# Patient Record
Sex: FEMALE | Race: WHITE | Employment: OTHER | URBAN - NONMETROPOLITAN AREA
[De-identification: names, ages, dates, MRNs, and addresses within clinical notes are randomized per-mention and may not be internally consistent; named-entity substitution may affect disease eponyms.]

---

## 2017-12-04 ENCOUNTER — DOCTOR'S OFFICE (OUTPATIENT)
Dept: URBAN - NONMETROPOLITAN AREA CLINIC 1 | Facility: CLINIC | Age: 67
Setting detail: OPHTHALMOLOGY
End: 2017-12-04
Payer: COMMERCIAL

## 2017-12-04 ENCOUNTER — RX ONLY (RX ONLY)
Age: 67
End: 2017-12-04

## 2017-12-04 DIAGNOSIS — H43.813: ICD-10-CM

## 2017-12-04 DIAGNOSIS — Z79.4: ICD-10-CM

## 2017-12-04 DIAGNOSIS — H43.812: ICD-10-CM

## 2017-12-04 DIAGNOSIS — H40.053: ICD-10-CM

## 2017-12-04 DIAGNOSIS — E11.3311: ICD-10-CM

## 2017-12-04 DIAGNOSIS — H43.811: ICD-10-CM

## 2017-12-04 DIAGNOSIS — H25.13: ICD-10-CM

## 2017-12-04 PROCEDURE — 92004 COMPRE OPH EXAM NEW PT 1/>: CPT | Performed by: OPHTHALMOLOGY

## 2017-12-04 PROCEDURE — 92225 OPHTHALMOSCOPY EXTENDED INITIAL: CPT | Performed by: OPHTHALMOLOGY

## 2017-12-04 PROCEDURE — 92134 CPTRZ OPH DX IMG PST SGM RTA: CPT | Performed by: OPHTHALMOLOGY

## 2017-12-04 ASSESSMENT — REFRACTION_MANIFEST
OS_VA2: 20/
OD_VA3: 20/
OD_VA1: 20/
OS_VA2: 20/
OD_VA2: 20/
OD_VA1: 20/
OD_VA2: 20/
OD_VA3: 20/
OS_VA3: 20/
OD_VA2: 20/
OS_VA3: 20/
OD_VA1: 20/
OU_VA: 20/
OU_VA: 20/
OS_VA2: 20/
OD_VA3: 20/
OU_VA: 20/
OS_VA1: 20/
OS_VA1: 20/
OS_VA3: 20/
OS_VA1: 20/

## 2017-12-04 ASSESSMENT — REFRACTION_CURRENTRX
OS_OVR_VA: 20/
OS_OVR_VA: 20/
OD_OVR_VA: 20/
OD_CYLINDER: SPH
OD_ADD: +2.75
OS_ADD: +2.75
OD_OVR_VA: 20/
OD_SPHERE: +3.50
OS_OVR_VA: 20/
OD_VPRISM_DIRECTION: BF
OS_SPHERE: +3.00
OS_AXIS: 117
OS_CYLINDER: -1.00
OS_VPRISM_DIRECTION: BF
OD_OVR_VA: 20/

## 2017-12-04 ASSESSMENT — CONFRONTATIONAL VISUAL FIELD TEST (CVF)
OD_FINDINGS: FULL
OS_FINDINGS: FULL

## 2017-12-04 ASSESSMENT — VISUAL ACUITY
OD_BCVA: 20/40
OS_BCVA: 20/50

## 2017-12-18 ENCOUNTER — DOCTOR'S OFFICE (OUTPATIENT)
Dept: URBAN - NONMETROPOLITAN AREA CLINIC 1 | Facility: CLINIC | Age: 67
Setting detail: OPHTHALMOLOGY
End: 2017-12-18
Payer: COMMERCIAL

## 2017-12-18 DIAGNOSIS — Z79.4: ICD-10-CM

## 2017-12-18 DIAGNOSIS — Z79.84: ICD-10-CM

## 2017-12-18 DIAGNOSIS — E11.3313: ICD-10-CM

## 2017-12-18 PROCEDURE — 92235 FLUORESCEIN ANGRPH MLTIFRAME: CPT | Performed by: OPHTHALMOLOGY

## 2017-12-18 PROCEDURE — 92012 INTRM OPH EXAM EST PATIENT: CPT | Performed by: OPHTHALMOLOGY

## 2017-12-18 PROCEDURE — 92250 FUNDUS PHOTOGRAPHY W/I&R: CPT | Performed by: OPHTHALMOLOGY

## 2017-12-18 ASSESSMENT — REFRACTION_MANIFEST
OS_VA3: 20/
OD_VA1: 20/
OD_VA3: 20/
OD_VA1: 20/
OS_VA2: 20/
OS_VA2: 20/
OU_VA: 20/
OD_VA1: 20/
OU_VA: 20/
OS_VA3: 20/
OS_VA1: 20/
OD_VA3: 20/
OD_VA2: 20/
OU_VA: 20/
OD_VA3: 20/
OD_VA2: 20/
OS_VA2: 20/
OD_VA2: 20/
OS_VA1: 20/
OS_VA1: 20/
OS_VA3: 20/

## 2017-12-18 ASSESSMENT — REFRACTION_CURRENTRX
OD_VPRISM_DIRECTION: BF
OD_SPHERE: +3.50
OD_CYLINDER: SPH
OS_SPHERE: +3.00
OS_CYLINDER: -1.00
OS_ADD: +2.75
OD_OVR_VA: 20/
OS_AXIS: 117
OS_VPRISM_DIRECTION: BF
OD_OVR_VA: 20/
OS_OVR_VA: 20/
OS_OVR_VA: 20/
OD_OVR_VA: 20/
OS_OVR_VA: 20/
OD_ADD: +2.75

## 2017-12-18 ASSESSMENT — VISUAL ACUITY
OD_BCVA: 20/40
OS_BCVA: 20/50

## 2017-12-18 ASSESSMENT — CONFRONTATIONAL VISUAL FIELD TEST (CVF)
OD_FINDINGS: FULL
OS_FINDINGS: FULL

## 2018-02-22 ENCOUNTER — DOCTOR'S OFFICE (OUTPATIENT)
Dept: URBAN - NONMETROPOLITAN AREA CLINIC 1 | Facility: CLINIC | Age: 68
Setting detail: OPHTHALMOLOGY
End: 2018-02-22
Payer: COMMERCIAL

## 2018-02-22 DIAGNOSIS — H43.811: ICD-10-CM

## 2018-02-22 DIAGNOSIS — H40.053: ICD-10-CM

## 2018-02-22 DIAGNOSIS — Z79.4: ICD-10-CM

## 2018-02-22 DIAGNOSIS — H25.13: ICD-10-CM

## 2018-02-22 DIAGNOSIS — Z79.84: ICD-10-CM

## 2018-02-22 DIAGNOSIS — E11.3393: ICD-10-CM

## 2018-02-22 DIAGNOSIS — H43.812: ICD-10-CM

## 2018-02-22 DIAGNOSIS — H43.813: ICD-10-CM

## 2018-02-22 PROCEDURE — 92134 CPTRZ OPH DX IMG PST SGM RTA: CPT | Performed by: OPHTHALMOLOGY

## 2018-02-22 PROCEDURE — 92226 OPHTHALMOSCOPY EXT SUBSEQUENT: CPT | Performed by: OPHTHALMOLOGY

## 2018-02-22 PROCEDURE — 92014 COMPRE OPH EXAM EST PT 1/>: CPT | Performed by: OPHTHALMOLOGY

## 2018-02-22 ASSESSMENT — REFRACTION_MANIFEST
OU_VA: 20/
OS_VA1: 20/
OS_VA3: 20/
OS_VA1: 20/
OS_VA3: 20/
OS_VA2: 20/
OS_VA3: 20/
OD_VA2: 20/
OD_VA1: 20/
OS_VA1: 20/
OD_VA3: 20/
OD_VA1: 20/
OD_VA3: 20/
OU_VA: 20/
OD_VA2: 20/
OD_VA3: 20/
OU_VA: 20/
OD_VA2: 20/
OS_VA2: 20/
OS_VA2: 20/
OD_VA1: 20/

## 2018-02-22 ASSESSMENT — REFRACTION_CURRENTRX
OD_CYLINDER: SPH
OS_OVR_VA: 20/
OD_OVR_VA: 20/
OS_ADD: +2.75
OS_SPHERE: +3.00
OS_VPRISM_DIRECTION: BF
OD_SPHERE: +3.50
OD_VPRISM_DIRECTION: BF
OS_AXIS: 117
OD_ADD: +2.75
OS_OVR_VA: 20/
OS_OVR_VA: 20/
OS_CYLINDER: -1.00
OD_OVR_VA: 20/
OD_OVR_VA: 20/

## 2018-02-22 ASSESSMENT — VISUAL ACUITY
OD_BCVA: 20/40
OS_BCVA: 20/30

## 2018-02-22 ASSESSMENT — CONFRONTATIONAL VISUAL FIELD TEST (CVF)
OD_FINDINGS: FULL
OS_FINDINGS: FULL

## 2018-04-20 ENCOUNTER — DOCTOR'S OFFICE (OUTPATIENT)
Dept: URBAN - NONMETROPOLITAN AREA CLINIC 1 | Facility: CLINIC | Age: 68
Setting detail: OPHTHALMOLOGY
End: 2018-04-20
Payer: COMMERCIAL

## 2018-04-20 DIAGNOSIS — Z79.4: ICD-10-CM

## 2018-04-20 DIAGNOSIS — H25.13: ICD-10-CM

## 2018-04-20 DIAGNOSIS — H43.813: ICD-10-CM

## 2018-04-20 DIAGNOSIS — E11.3393: ICD-10-CM

## 2018-04-20 DIAGNOSIS — H43.811: ICD-10-CM

## 2018-04-20 DIAGNOSIS — H43.812: ICD-10-CM

## 2018-04-20 DIAGNOSIS — Z79.84: ICD-10-CM

## 2018-04-20 DIAGNOSIS — H40.053: ICD-10-CM

## 2018-04-20 PROCEDURE — 92014 COMPRE OPH EXAM EST PT 1/>: CPT | Performed by: OPHTHALMOLOGY

## 2018-04-20 PROCEDURE — 92134 CPTRZ OPH DX IMG PST SGM RTA: CPT | Performed by: OPHTHALMOLOGY

## 2018-04-20 PROCEDURE — 92225 OPHTHALMOSCOPY EXTENDED INITIAL: CPT | Performed by: OPHTHALMOLOGY

## 2018-04-20 ASSESSMENT — REFRACTION_MANIFEST
OS_VA2: 20/
OD_VA3: 20/
OS_VA3: 20/
OD_VA1: 20/
OS_VA3: 20/
OD_VA1: 20/
OD_VA2: 20/
OU_VA: 20/
OS_VA2: 20/
OS_VA3: 20/
OS_VA2: 20/
OU_VA: 20/
OD_VA3: 20/
OS_VA1: 20/
OD_VA2: 20/
OS_VA1: 20/
OD_VA1: 20/
OD_VA3: 20/
OD_VA2: 20/
OS_VA1: 20/
OU_VA: 20/

## 2018-04-20 ASSESSMENT — REFRACTION_CURRENTRX
OS_SPHERE: +3.00
OD_OVR_VA: 20/
OS_CYLINDER: -1.00
OD_OVR_VA: 20/
OS_VPRISM_DIRECTION: BF
OD_VPRISM_DIRECTION: BF
OD_OVR_VA: 20/
OD_ADD: +2.75
OS_OVR_VA: 20/
OD_SPHERE: +3.50
OS_AXIS: 117
OD_CYLINDER: SPH
OS_OVR_VA: 20/
OS_ADD: +2.75
OS_OVR_VA: 20/

## 2018-04-20 ASSESSMENT — VISUAL ACUITY
OS_BCVA: 20/30-2
OD_BCVA: 20/40

## 2018-04-20 ASSESSMENT — CONFRONTATIONAL VISUAL FIELD TEST (CVF)
OS_FINDINGS: FULL
OD_FINDINGS: FULL

## 2018-05-24 ENCOUNTER — DOCTOR'S OFFICE (OUTPATIENT)
Dept: URBAN - NONMETROPOLITAN AREA CLINIC 1 | Facility: CLINIC | Age: 68
Setting detail: OPHTHALMOLOGY
End: 2018-05-24
Payer: COMMERCIAL

## 2018-05-24 DIAGNOSIS — Z79.84: ICD-10-CM

## 2018-05-24 DIAGNOSIS — H43.811: ICD-10-CM

## 2018-05-24 DIAGNOSIS — H40.053: ICD-10-CM

## 2018-05-24 DIAGNOSIS — E11.3493: ICD-10-CM

## 2018-05-24 DIAGNOSIS — H43.813: ICD-10-CM

## 2018-05-24 DIAGNOSIS — H25.13: ICD-10-CM

## 2018-05-24 DIAGNOSIS — Z79.4: ICD-10-CM

## 2018-05-24 DIAGNOSIS — H43.812: ICD-10-CM

## 2018-05-24 PROCEDURE — 92235 FLUORESCEIN ANGRPH MLTIFRAME: CPT | Performed by: OPHTHALMOLOGY

## 2018-05-24 PROCEDURE — 92014 COMPRE OPH EXAM EST PT 1/>: CPT | Performed by: OPHTHALMOLOGY

## 2018-05-24 PROCEDURE — 92226 OPHTHALMOSCOPY EXT SUBSEQUENT: CPT | Performed by: OPHTHALMOLOGY

## 2018-05-24 PROCEDURE — 92250 FUNDUS PHOTOGRAPHY W/I&R: CPT | Performed by: OPHTHALMOLOGY

## 2018-05-24 ASSESSMENT — REFRACTION_MANIFEST
OU_VA: 20/
OS_VA2: 20/
OS_VA1: 20/
OD_VA3: 20/
OD_VA2: 20/
OS_VA3: 20/
OD_VA3: 20/
OD_VA2: 20/
OS_VA3: 20/
OS_VA1: 20/
OS_VA2: 20/
OS_VA3: 20/
OU_VA: 20/
OS_VA2: 20/
OD_VA1: 20/
OS_VA1: 20/
OD_VA3: 20/
OD_VA1: 20/
OD_VA2: 20/
OU_VA: 20/
OD_VA1: 20/

## 2018-05-24 ASSESSMENT — REFRACTION_CURRENTRX
OD_OVR_VA: 20/
OS_CYLINDER: -1.00
OS_ADD: +2.75
OD_OVR_VA: 20/
OD_VPRISM_DIRECTION: BF
OS_OVR_VA: 20/
OD_ADD: +2.75
OD_OVR_VA: 20/
OS_AXIS: 117
OD_SPHERE: +3.50
OS_OVR_VA: 20/
OS_OVR_VA: 20/
OS_VPRISM_DIRECTION: BF
OS_SPHERE: +3.00
OD_CYLINDER: SPH

## 2018-05-24 ASSESSMENT — CONFRONTATIONAL VISUAL FIELD TEST (CVF)
OD_FINDINGS: FULL
OS_FINDINGS: FULL

## 2018-05-24 ASSESSMENT — VISUAL ACUITY
OD_BCVA: 20/40
OS_BCVA: 20/30-2

## 2018-06-27 ENCOUNTER — DOCTOR'S OFFICE (OUTPATIENT)
Dept: URBAN - NONMETROPOLITAN AREA CLINIC 1 | Facility: CLINIC | Age: 68
Setting detail: OPHTHALMOLOGY
End: 2018-06-27
Payer: COMMERCIAL

## 2018-06-27 DIAGNOSIS — H25.13: ICD-10-CM

## 2018-06-27 DIAGNOSIS — H40.053: ICD-10-CM

## 2018-06-27 DIAGNOSIS — Z79.4: ICD-10-CM

## 2018-06-27 DIAGNOSIS — Z79.84: ICD-10-CM

## 2018-06-27 DIAGNOSIS — E11.3493: ICD-10-CM

## 2018-06-27 DIAGNOSIS — H43.813: ICD-10-CM

## 2018-06-27 PROCEDURE — 92014 COMPRE OPH EXAM EST PT 1/>: CPT | Performed by: OPHTHALMOLOGY

## 2018-06-27 ASSESSMENT — REFRACTION_MANIFEST
OS_VA3: 20/
OS_VA2: 20/
OD_VA1: 20/
OD_VA1: 20/
OS_VA1: 20/
OU_VA: 20/
OS_VA3: 20/
OS_VA1: 20/
OD_VA3: 20/
OS_VA2: 20/
OU_VA: 20/
OS_VA2: 20/
OS_VA3: 20/
OD_VA3: 20/
OU_VA: 20/
OD_VA2: 20/
OD_VA1: 20/
OS_VA1: 20/
OD_VA3: 20/
OD_VA2: 20/
OD_VA2: 20/

## 2018-06-27 ASSESSMENT — VISUAL ACUITY
OS_BCVA: 20/40
OD_BCVA: 20/40

## 2018-06-27 ASSESSMENT — REFRACTION_CURRENTRX
OD_OVR_VA: 20/
OS_VPRISM_DIRECTION: BF
OS_OVR_VA: 20/
OS_CYLINDER: -1.00
OD_OVR_VA: 20/
OD_VPRISM_DIRECTION: BF
OS_SPHERE: +3.00
OD_CYLINDER: SPH
OS_OVR_VA: 20/
OS_ADD: +2.75
OD_SPHERE: +3.50
OD_ADD: +2.75
OD_OVR_VA: 20/
OS_OVR_VA: 20/
OS_AXIS: 117

## 2018-06-27 ASSESSMENT — CONFRONTATIONAL VISUAL FIELD TEST (CVF)
OS_FINDINGS: FULL
OD_FINDINGS: FULL

## 2018-06-27 ASSESSMENT — REFRACTION_AUTOREFRACTION
OS_CYLINDER: -1.00
OD_CYLINDER: -1.25
OD_AXIS: 053
OS_SPHERE: +2.75
OD_SPHERE: +4.00
OS_AXIS: 150

## 2018-06-27 ASSESSMENT — SPHEQUIV_DERIVED
OS_SPHEQUIV: 2.25
OD_SPHEQUIV: 3.375

## 2018-07-24 ENCOUNTER — DOCTOR'S OFFICE (OUTPATIENT)
Dept: URBAN - NONMETROPOLITAN AREA CLINIC 1 | Facility: CLINIC | Age: 68
Setting detail: OPHTHALMOLOGY
End: 2018-07-24
Payer: COMMERCIAL

## 2018-07-24 DIAGNOSIS — H25.11: ICD-10-CM

## 2018-07-24 PROCEDURE — 92136 OPHTHALMIC BIOMETRY: CPT | Performed by: OPHTHALMOLOGY

## 2018-08-02 ENCOUNTER — AMBUL SURGICAL CARE (OUTPATIENT)
Dept: URBAN - NONMETROPOLITAN AREA SURGERY 1 | Facility: SURGERY | Age: 68
Setting detail: OPHTHALMOLOGY
End: 2018-08-02
Payer: COMMERCIAL

## 2018-08-02 DIAGNOSIS — H25.11: ICD-10-CM

## 2018-08-02 DIAGNOSIS — H25.011: ICD-10-CM

## 2018-08-02 PROCEDURE — 66984 XCAPSL CTRC RMVL W/O ECP: CPT | Performed by: OPHTHALMOLOGY

## 2018-08-02 PROCEDURE — G8907 PT DOC NO EVENTS ON DISCHARG: HCPCS | Performed by: OPHTHALMOLOGY

## 2018-08-02 PROCEDURE — G8918 PT W/O PREOP ORDER IV AB PRO: HCPCS | Performed by: OPHTHALMOLOGY

## 2018-08-03 ENCOUNTER — DOCTOR'S OFFICE (OUTPATIENT)
Dept: URBAN - NONMETROPOLITAN AREA CLINIC 1 | Facility: CLINIC | Age: 68
Setting detail: OPHTHALMOLOGY
End: 2018-08-03
Payer: COMMERCIAL

## 2018-08-03 ENCOUNTER — RX ONLY (RX ONLY)
Age: 68
End: 2018-08-03

## 2018-08-03 DIAGNOSIS — Z96.1: ICD-10-CM

## 2018-08-03 DIAGNOSIS — H25.12: ICD-10-CM

## 2018-08-03 PROCEDURE — 92136 OPHTHALMIC BIOMETRY: CPT | Performed by: OPHTHALMOLOGY

## 2018-08-03 PROCEDURE — 99024 POSTOP FOLLOW-UP VISIT: CPT | Performed by: PHYSICIAN ASSISTANT

## 2018-08-06 ASSESSMENT — REFRACTION_MANIFEST
OS_VA2: 20/
OD_VA1: 20/
OS_VA1: 20/
OS_VA2: 20/
OD_VA2: 20/
OS_VA3: 20/
OD_VA2: 20/
OS_VA1: 20/
OU_VA: 20/
OD_VA3: 20/
OS_VA3: 20/
OS_VA3: 20/
OS_VA2: 20/
OS_VA1: 20/
OD_VA2: 20/
OU_VA: 20/
OD_VA3: 20/
OD_VA3: 20/
OD_VA1: 20/
OU_VA: 20/
OD_VA1: 20/

## 2018-08-06 ASSESSMENT — REFRACTION_CURRENTRX
OD_OVR_VA: 20/
OS_OVR_VA: 20/
OS_AXIS: 117
OD_OVR_VA: 20/
OD_VPRISM_DIRECTION: BF
OS_OVR_VA: 20/
OS_SPHERE: +3.00
OS_OVR_VA: 20/
OS_VPRISM_DIRECTION: BF
OD_ADD: +2.75
OD_CYLINDER: SPH
OD_SPHERE: +3.50
OS_CYLINDER: -1.00
OS_ADD: +2.75
OD_OVR_VA: 20/

## 2018-08-06 ASSESSMENT — REFRACTION_AUTOREFRACTION
OS_CYLINDER: -1.00
OS_AXIS: 108
OD_AXIS: 180
OD_SPHERE: +1.00
OD_CYLINDER: 0.00
OS_SPHERE: +2.75

## 2018-08-06 ASSESSMENT — VISUAL ACUITY
OS_BCVA: 20/40
OD_BCVA: 20/40

## 2018-08-06 ASSESSMENT — SPHEQUIV_DERIVED
OD_SPHEQUIV: 1
OS_SPHEQUIV: 2.25

## 2018-08-09 ENCOUNTER — AMBUL SURGICAL CARE (OUTPATIENT)
Dept: URBAN - NONMETROPOLITAN AREA SURGERY 1 | Facility: SURGERY | Age: 68
Setting detail: OPHTHALMOLOGY
End: 2018-08-09
Payer: COMMERCIAL

## 2018-08-09 DIAGNOSIS — H25.12: ICD-10-CM

## 2018-08-09 DIAGNOSIS — H25.042: ICD-10-CM

## 2018-08-09 PROCEDURE — G8907 PT DOC NO EVENTS ON DISCHARG: HCPCS | Performed by: OPHTHALMOLOGY

## 2018-08-09 PROCEDURE — 66984 XCAPSL CTRC RMVL W/O ECP: CPT | Performed by: OPHTHALMOLOGY

## 2018-08-09 PROCEDURE — G8918 PT W/O PREOP ORDER IV AB PRO: HCPCS | Performed by: OPHTHALMOLOGY

## 2018-08-10 ENCOUNTER — DOCTOR'S OFFICE (OUTPATIENT)
Dept: URBAN - NONMETROPOLITAN AREA CLINIC 1 | Facility: CLINIC | Age: 68
Setting detail: OPHTHALMOLOGY
End: 2018-08-10
Payer: COMMERCIAL

## 2018-08-10 DIAGNOSIS — Z96.1: ICD-10-CM

## 2018-08-10 PROBLEM — H25.12 CATARACT NUCLEAR SCLEROSIS; LEFT EYE: Status: RESOLVED | Noted: 2018-08-03 | Resolved: 2018-08-10

## 2018-08-10 PROCEDURE — 99024 POSTOP FOLLOW-UP VISIT: CPT | Performed by: OPHTHALMOLOGY

## 2018-08-10 ASSESSMENT — REFRACTION_CURRENTRX
OS_AXIS: 117
OS_SPHERE: +3.00
OS_ADD: +2.75
OD_CYLINDER: SPH
OD_OVR_VA: 20/
OS_VPRISM_DIRECTION: BF
OD_ADD: +2.75
OS_OVR_VA: 20/
OS_OVR_VA: 20/
OD_OVR_VA: 20/
OS_CYLINDER: -1.00
OS_OVR_VA: 20/
OD_SPHERE: +3.50
OD_VPRISM_DIRECTION: BF
OD_OVR_VA: 20/

## 2018-08-10 ASSESSMENT — REFRACTION_MANIFEST
OD_VA1: 20/
OD_VA3: 20/
OS_VA2: 20/
OU_VA: 20/
OS_VA1: 20/
OS_VA3: 20/
OU_VA: 20/
OD_VA3: 20/
OD_VA1: 20/
OD_VA3: 20/
OS_VA3: 20/
OD_VA2: 20/
OD_VA2: 20/
OS_VA1: 20/
OU_VA: 20/
OS_VA3: 20/
OD_VA2: 20/
OS_VA1: 20/
OD_VA1: 20/

## 2018-08-10 ASSESSMENT — REFRACTION_AUTOREFRACTION
OD_CYLINDER: -0.75
OD_AXIS: 174
OS_CYLINDER: -0.25
OS_SPHERE: +0.50
OS_AXIS: 171
OD_SPHERE: +0.75

## 2018-08-10 ASSESSMENT — SPHEQUIV_DERIVED
OD_SPHEQUIV: 0.375
OS_SPHEQUIV: 0.375

## 2018-08-10 ASSESSMENT — VISUAL ACUITY
OS_BCVA: 20/30-1
OD_BCVA: 20/25-1

## 2018-08-21 ENCOUNTER — DOCTOR'S OFFICE (OUTPATIENT)
Dept: URBAN - NONMETROPOLITAN AREA CLINIC 1 | Facility: CLINIC | Age: 68
Setting detail: OPHTHALMOLOGY
End: 2018-08-21
Payer: COMMERCIAL

## 2018-08-21 DIAGNOSIS — Z96.1: ICD-10-CM

## 2018-08-21 PROCEDURE — 99024 POSTOP FOLLOW-UP VISIT: CPT | Performed by: PHYSICIAN ASSISTANT

## 2018-08-23 ASSESSMENT — REFRACTION_MANIFEST
OD_VA1: 20/
OU_VA: 20/
OD_VA3: 20/
OS_VA3: 20/
OD_VA3: 20/
OD_VA3: 20/
OU_VA: 20/
OS_VA1: 20/
OS_VA1: 20/
OS_VA2: 20/
OD_VA2: 20/
OS_VA1: 20/
OS_VA3: 20/
OD_VA2: 20/
OD_VA1: 20/
OU_VA: 20/
OD_VA2: 20/
OS_VA2: 20/
OD_VA1: 20/
OS_VA2: 20/
OS_VA3: 20/

## 2018-08-23 ASSESSMENT — REFRACTION_CURRENTRX
OS_SPHERE: +3.00
OD_OVR_VA: 20/
OD_ADD: +2.75
OS_CYLINDER: -1.00
OS_AXIS: 117
OD_CYLINDER: SPH
OD_OVR_VA: 20/
OD_SPHERE: +3.50
OS_OVR_VA: 20/
OS_VPRISM_DIRECTION: BF
OD_OVR_VA: 20/
OS_OVR_VA: 20/
OS_ADD: +2.75
OS_OVR_VA: 20/
OD_VPRISM_DIRECTION: BF

## 2018-08-23 ASSESSMENT — REFRACTION_AUTOREFRACTION
OD_CYLINDER: -0.75
OD_SPHERE: +1.00
OS_SPHERE: +0.50
OD_AXIS: 176
OS_AXIS: 022
OS_CYLINDER: -0.50

## 2018-08-23 ASSESSMENT — SPHEQUIV_DERIVED
OD_SPHEQUIV: 0.625
OS_SPHEQUIV: 0.25

## 2018-08-23 ASSESSMENT — VISUAL ACUITY
OD_BCVA: 20/25-1
OS_BCVA: 20/30-1

## 2018-09-10 ENCOUNTER — DOCTOR'S OFFICE (OUTPATIENT)
Dept: URBAN - NONMETROPOLITAN AREA CLINIC 1 | Facility: CLINIC | Age: 68
Setting detail: OPHTHALMOLOGY
End: 2018-09-10
Payer: COMMERCIAL

## 2018-09-10 DIAGNOSIS — Z79.4: ICD-10-CM

## 2018-09-10 DIAGNOSIS — H43.813: ICD-10-CM

## 2018-09-10 DIAGNOSIS — H43.811: ICD-10-CM

## 2018-09-10 DIAGNOSIS — E11.3493: ICD-10-CM

## 2018-09-10 DIAGNOSIS — Z79.84: ICD-10-CM

## 2018-09-10 DIAGNOSIS — H40.053: ICD-10-CM

## 2018-09-10 DIAGNOSIS — H43.812: ICD-10-CM

## 2018-09-10 DIAGNOSIS — H43.822: ICD-10-CM

## 2018-09-10 PROCEDURE — 99024 POSTOP FOLLOW-UP VISIT: CPT | Performed by: OPHTHALMOLOGY

## 2018-09-10 PROCEDURE — 92226 OPHTHALMOSCOPY EXT SUBSEQUENT: CPT | Performed by: OPHTHALMOLOGY

## 2018-09-10 PROCEDURE — 92134 CPTRZ OPH DX IMG PST SGM RTA: CPT | Performed by: OPHTHALMOLOGY

## 2018-09-10 ASSESSMENT — SPHEQUIV_DERIVED
OS_SPHEQUIV: 0.25
OD_SPHEQUIV: 0.625

## 2018-09-10 ASSESSMENT — REFRACTION_CURRENTRX
OS_ADD: +2.75
OD_OVR_VA: 20/
OD_ADD: +2.75
OS_SPHERE: +3.00
OS_OVR_VA: 20/
OD_CYLINDER: SPH
OD_OVR_VA: 20/
OS_OVR_VA: 20/
OS_CYLINDER: -1.00
OS_VPRISM_DIRECTION: BF
OS_AXIS: 117
OD_OVR_VA: 20/
OD_SPHERE: +3.50
OS_OVR_VA: 20/
OD_VPRISM_DIRECTION: BF

## 2018-09-10 ASSESSMENT — REFRACTION_MANIFEST
OS_VA3: 20/
OD_VA1: 20/
OU_VA: 20/
OD_VA2: 20/
OD_VA3: 20/
OD_VA2: 20/
OD_VA3: 20/
OS_VA2: 20/
OS_VA2: 20/
OD_VA1: 20/
OS_VA1: 20/
OS_VA3: 20/
OS_VA1: 20/
OU_VA: 20/

## 2018-09-10 ASSESSMENT — VISUAL ACUITY
OD_BCVA: 20/25
OS_BCVA: 20/40+1

## 2018-09-10 ASSESSMENT — REFRACTION_AUTOREFRACTION
OD_CYLINDER: -0.75
OS_CYLINDER: -0.50
OD_SPHERE: +1.00
OS_AXIS: 022
OS_SPHERE: +0.50
OD_AXIS: 176

## 2018-09-10 ASSESSMENT — CONFRONTATIONAL VISUAL FIELD TEST (CVF)
OS_FINDINGS: FULL
OD_FINDINGS: FULL

## 2018-10-29 ENCOUNTER — DOCTOR'S OFFICE (OUTPATIENT)
Dept: URBAN - NONMETROPOLITAN AREA CLINIC 1 | Facility: CLINIC | Age: 68
Setting detail: OPHTHALMOLOGY
End: 2018-10-29
Payer: COMMERCIAL

## 2018-10-29 DIAGNOSIS — H52.4: ICD-10-CM

## 2018-10-29 DIAGNOSIS — Z96.1: ICD-10-CM

## 2018-10-29 PROCEDURE — 92015 DETERMINE REFRACTIVE STATE: CPT | Performed by: OPTOMETRIST

## 2018-10-29 ASSESSMENT — REFRACTION_MANIFEST
OS_VA3: 20/
OD_AXIS: 063
OD_VA1: 20/
OS_VA3: 20/
OD_ADD: +2.50
OS_VA2: 20/
OD_SPHERE: +1.00
OS_SPHERE: PLANO
OU_VA: 20/
OD_CYLINDER: -0.50
OD_VA3: 20/
OS_VA1: 20/20-1
OD_VA2: 20/25+2
OD_VA2: 20/
OD_VA1: 20/20-2
OS_AXIS: 115
OS_VA2: 20/20-2
OS_CYLINDER: -0.50
OS_ADD: +2.50
OD_VA3: 20/
OS_VA1: 20/
OU_VA: 20/

## 2018-10-29 ASSESSMENT — REFRACTION_CURRENTRX
OD_SPHERE: +3.50
OD_VPRISM_DIRECTION: BF
OD_OVR_VA: 20/
OD_CYLINDER: SPH
OS_VPRISM_DIRECTION: BF
OD_OVR_VA: 20/
OS_OVR_VA: 20/
OD_OVR_VA: 20/
OS_OVR_VA: 20/
OS_SPHERE: +3.00
OS_OVR_VA: 20/
OS_AXIS: 117
OS_CYLINDER: -1.00
OD_ADD: +2.75
OS_ADD: +2.75

## 2018-10-29 ASSESSMENT — SPHEQUIV_DERIVED
OS_SPHEQUIV: 0.375
OD_SPHEQUIV: 1.125
OD_SPHEQUIV: 0.75

## 2018-10-29 ASSESSMENT — REFRACTION_AUTOREFRACTION
OD_AXIS: 063
OD_SPHERE: +1.25
OD_CYLINDER: -0.25
OS_CYLINDER: -0.75
OS_SPHERE: +0.75
OS_AXIS: 115

## 2018-10-29 ASSESSMENT — VISUAL ACUITY
OD_BCVA: 20/20-1
OS_BCVA: 20/40+1

## 2018-11-01 ENCOUNTER — OPTICAL (OUTPATIENT)
Dept: URBAN - NONMETROPOLITAN AREA CLINIC 6 | Facility: CLINIC | Age: 68
Setting detail: OPHTHALMOLOGY
End: 2018-11-01
Payer: COMMERCIAL

## 2018-11-01 DIAGNOSIS — Z96.1: ICD-10-CM

## 2018-11-01 PROCEDURE — V2020 VISION SVCS FRAMES PURCHASES: HCPCS | Performed by: OPTOMETRIST

## 2018-11-01 PROCEDURE — V2203 LENS SPHCYL BIFOCAL 4.00D/.1: HCPCS | Performed by: OPTOMETRIST

## 2018-11-26 ENCOUNTER — DOCTOR'S OFFICE (OUTPATIENT)
Dept: URBAN - NONMETROPOLITAN AREA CLINIC 1 | Facility: CLINIC | Age: 68
Setting detail: OPHTHALMOLOGY
End: 2018-11-26
Payer: COMMERCIAL

## 2018-11-26 DIAGNOSIS — H43.813: ICD-10-CM

## 2018-11-26 DIAGNOSIS — E11.3493: ICD-10-CM

## 2018-11-26 DIAGNOSIS — H43.812: ICD-10-CM

## 2018-11-26 DIAGNOSIS — H43.811: ICD-10-CM

## 2018-11-26 DIAGNOSIS — H40.053: ICD-10-CM

## 2018-11-26 PROCEDURE — 92014 COMPRE OPH EXAM EST PT 1/>: CPT | Performed by: OPHTHALMOLOGY

## 2018-11-26 PROCEDURE — 92134 CPTRZ OPH DX IMG PST SGM RTA: CPT | Performed by: OPHTHALMOLOGY

## 2018-11-26 PROCEDURE — 92226 OPHTHALMOSCOPY EXT SUBSEQUENT: CPT | Performed by: OPHTHALMOLOGY

## 2018-11-26 ASSESSMENT — REFRACTION_MANIFEST
OS_VA3: 20/
OS_SPHERE: PLANO
OU_VA: 20/
OS_VA3: 20/
OD_CYLINDER: -0.50
OD_VA1: 20/
OD_ADD: +2.50
OD_VA2: 20/25+2
OD_VA2: 20/
OS_VA2: 20/
OS_AXIS: 115
OD_VA1: 20/20-2
OS_CYLINDER: -0.50
OU_VA: 20/
OS_ADD: +2.50
OD_VA3: 20/
OS_VA1: 20/
OS_VA1: 20/20-1
OD_SPHERE: +1.00
OS_VA2: 20/20-2
OD_AXIS: 063
OD_VA3: 20/

## 2018-11-26 ASSESSMENT — REFRACTION_CURRENTRX
OS_ADD: +2.75
OD_ADD: +2.75
OD_OVR_VA: 20/
OS_AXIS: 117
OS_OVR_VA: 20/
OS_OVR_VA: 20/
OD_CYLINDER: SPH
OS_VPRISM_DIRECTION: BF
OD_OVR_VA: 20/
OS_SPHERE: +3.00
OD_OVR_VA: 20/
OD_VPRISM_DIRECTION: BF
OS_CYLINDER: -1.00
OS_OVR_VA: 20/
OD_SPHERE: +3.50

## 2018-11-26 ASSESSMENT — SPHEQUIV_DERIVED
OD_SPHEQUIV: 0.75
OS_SPHEQUIV: 0.375
OD_SPHEQUIV: 1.125

## 2018-11-26 ASSESSMENT — REFRACTION_AUTOREFRACTION
OS_CYLINDER: -0.75
OS_AXIS: 115
OS_SPHERE: +0.75
OD_SPHERE: +1.25
OD_CYLINDER: -0.25
OD_AXIS: 063

## 2018-11-26 ASSESSMENT — CONFRONTATIONAL VISUAL FIELD TEST (CVF)
OD_FINDINGS: FULL
OS_FINDINGS: FULL

## 2018-11-26 ASSESSMENT — VISUAL ACUITY
OD_BCVA: 20/20-1
OS_BCVA: 20/20-2

## 2018-12-03 ENCOUNTER — DOCTOR'S OFFICE (OUTPATIENT)
Dept: URBAN - NONMETROPOLITAN AREA CLINIC 1 | Facility: CLINIC | Age: 68
Setting detail: OPHTHALMOLOGY
End: 2018-12-03
Payer: COMMERCIAL

## 2018-12-03 DIAGNOSIS — E11.3493: ICD-10-CM

## 2018-12-03 DIAGNOSIS — H43.813: ICD-10-CM

## 2018-12-03 DIAGNOSIS — H40.053: ICD-10-CM

## 2018-12-03 PROCEDURE — 92012 INTRM OPH EXAM EST PATIENT: CPT | Performed by: OPHTHALMOLOGY

## 2018-12-03 PROCEDURE — 92250 FUNDUS PHOTOGRAPHY W/I&R: CPT | Performed by: OPHTHALMOLOGY

## 2018-12-03 PROCEDURE — 92235 FLUORESCEIN ANGRPH MLTIFRAME: CPT | Performed by: OPHTHALMOLOGY

## 2018-12-03 ASSESSMENT — REFRACTION_CURRENTRX
OS_ADD: +2.75
OS_OVR_VA: 20/
OD_CYLINDER: SPH
OS_OVR_VA: 20/
OD_OVR_VA: 20/
OS_OVR_VA: 20/
OS_SPHERE: +3.00
OD_SPHERE: +3.50
OD_OVR_VA: 20/
OS_AXIS: 117
OD_OVR_VA: 20/
OS_CYLINDER: -1.00
OD_VPRISM_DIRECTION: BF
OS_VPRISM_DIRECTION: BF
OD_ADD: +2.75

## 2018-12-03 ASSESSMENT — REFRACTION_MANIFEST
OS_CYLINDER: -0.50
OD_AXIS: 063
OD_VA3: 20/
OU_VA: 20/
OD_VA2: 20/25+2
OD_VA3: 20/
OS_ADD: +2.50
OD_VA2: 20/
OS_AXIS: 115
OD_CYLINDER: -0.50
OD_ADD: +2.50
OS_VA2: 20/20-2
OD_SPHERE: +1.00
OS_VA1: 20/
OU_VA: 20/
OS_VA1: 20/20-1
OS_SPHERE: PLANO
OS_VA3: 20/
OD_VA1: 20/20-2
OS_VA3: 20/
OD_VA1: 20/
OS_VA2: 20/

## 2018-12-03 ASSESSMENT — SPHEQUIV_DERIVED
OD_SPHEQUIV: 0.75
OS_SPHEQUIV: 0.375
OD_SPHEQUIV: 1.125

## 2018-12-03 ASSESSMENT — REFRACTION_AUTOREFRACTION
OD_SPHERE: +1.25
OD_CYLINDER: -0.25
OS_SPHERE: +0.75
OD_AXIS: 063
OS_AXIS: 115
OS_CYLINDER: -0.75

## 2018-12-03 ASSESSMENT — CONFRONTATIONAL VISUAL FIELD TEST (CVF)
OD_FINDINGS: FULL
OS_FINDINGS: FULL

## 2018-12-03 ASSESSMENT — VISUAL ACUITY
OD_BCVA: 20/20-1
OS_BCVA: 20/20-2

## 2019-03-08 ENCOUNTER — DOCTOR'S OFFICE (OUTPATIENT)
Dept: URBAN - NONMETROPOLITAN AREA CLINIC 1 | Facility: CLINIC | Age: 69
Setting detail: OPHTHALMOLOGY
End: 2019-03-08
Payer: COMMERCIAL

## 2019-03-08 DIAGNOSIS — E11.3492: ICD-10-CM

## 2019-03-08 DIAGNOSIS — Z96.1: ICD-10-CM

## 2019-03-08 DIAGNOSIS — E11.3491: ICD-10-CM

## 2019-03-08 DIAGNOSIS — H43.813: ICD-10-CM

## 2019-03-08 DIAGNOSIS — H43.823: ICD-10-CM

## 2019-03-08 DIAGNOSIS — H40.053: ICD-10-CM

## 2019-03-08 DIAGNOSIS — H53.123: ICD-10-CM

## 2019-03-08 PROCEDURE — 92134 CPTRZ OPH DX IMG PST SGM RTA: CPT | Performed by: OPHTHALMOLOGY

## 2019-03-08 PROCEDURE — 92014 COMPRE OPH EXAM EST PT 1/>: CPT | Performed by: OPHTHALMOLOGY

## 2019-03-08 ASSESSMENT — REFRACTION_MANIFEST
OS_ADD: +2.50
OS_AXIS: 115
OU_VA: 20/
OS_VA1: 20/20-1
OD_VA2: 20/25+2
OS_VA3: 20/
OS_VA2: 20/
OS_CYLINDER: -0.50
OU_VA: 20/
OD_VA2: 20/
OS_SPHERE: PLANO
OD_VA1: 20/20-2
OD_CYLINDER: -0.50
OD_VA3: 20/
OD_AXIS: 063
OD_SPHERE: +1.00
OS_VA3: 20/
OD_VA3: 20/
OD_VA1: 20/
OS_VA1: 20/
OS_VA2: 20/20-2
OD_ADD: +2.50

## 2019-03-08 ASSESSMENT — REFRACTION_CURRENTRX
OS_OVR_VA: 20/
OD_ADD: +2.75
OS_ADD: +2.75
OD_OVR_VA: 20/
OD_OVR_VA: 20/
OS_OVR_VA: 20/
OD_CYLINDER: SPH
OS_AXIS: 117
OD_SPHERE: +3.50
OD_OVR_VA: 20/
OS_OVR_VA: 20/
OS_SPHERE: +3.00
OS_VPRISM_DIRECTION: BF
OD_VPRISM_DIRECTION: BF
OS_CYLINDER: -1.00

## 2019-03-08 ASSESSMENT — REFRACTION_AUTOREFRACTION
OS_AXIS: 115
OD_SPHERE: +1.25
OS_CYLINDER: -0.75
OS_SPHERE: +0.75
OD_CYLINDER: -0.25
OD_AXIS: 063

## 2019-03-08 ASSESSMENT — VISUAL ACUITY
OD_BCVA: 20/25
OS_BCVA: 20/25

## 2019-03-08 ASSESSMENT — CONFRONTATIONAL VISUAL FIELD TEST (CVF)
OD_FINDINGS: FULL
OS_FINDINGS: FULL

## 2019-03-08 ASSESSMENT — SPHEQUIV_DERIVED
OS_SPHEQUIV: 0.375
OD_SPHEQUIV: 1.125
OD_SPHEQUIV: 0.75

## 2019-09-11 ENCOUNTER — DOCTOR'S OFFICE (OUTPATIENT)
Dept: URBAN - NONMETROPOLITAN AREA CLINIC 1 | Facility: CLINIC | Age: 69
Setting detail: OPHTHALMOLOGY
End: 2019-09-11
Payer: COMMERCIAL

## 2019-09-11 ENCOUNTER — RX ONLY (RX ONLY)
Age: 69
End: 2019-09-11

## 2019-09-11 DIAGNOSIS — H43.813: ICD-10-CM

## 2019-09-11 DIAGNOSIS — Z79.4: ICD-10-CM

## 2019-09-11 DIAGNOSIS — H40.053: ICD-10-CM

## 2019-09-11 DIAGNOSIS — Z96.1: ICD-10-CM

## 2019-09-11 DIAGNOSIS — E11.3292: ICD-10-CM

## 2019-09-11 DIAGNOSIS — E11.3291: ICD-10-CM

## 2019-09-11 PROBLEM — H53.123 SUBJECTIVE VISUAL DISTURBANCE; BOTH EYES: Status: RESOLVED | Noted: 2018-04-20 | Resolved: 2019-09-11

## 2019-09-11 PROCEDURE — 92083 EXTENDED VISUAL FIELD XM: CPT | Performed by: OPHTHALMOLOGY

## 2019-09-11 PROCEDURE — 76514 ECHO EXAM OF EYE THICKNESS: CPT | Performed by: OPHTHALMOLOGY

## 2019-09-11 PROCEDURE — 92134 CPTRZ OPH DX IMG PST SGM RTA: CPT | Performed by: OPHTHALMOLOGY

## 2019-09-11 PROCEDURE — 92014 COMPRE OPH EXAM EST PT 1/>: CPT | Performed by: OPHTHALMOLOGY

## 2019-09-11 ASSESSMENT — REFRACTION_CURRENTRX
OS_OVR_VA: 20/
OD_OVR_VA: 20/
OS_OVR_VA: 20/
OS_VPRISM_DIRECTION: BF
OS_CYLINDER: -1.00
OS_OVR_VA: 20/
OD_VPRISM_DIRECTION: BF
OD_OVR_VA: 20/
OD_CYLINDER: SPH
OS_AXIS: 117
OS_SPHERE: +3.00
OD_OVR_VA: 20/
OD_ADD: +2.75
OS_ADD: +2.75
OD_SPHERE: +3.50

## 2019-09-11 ASSESSMENT — REFRACTION_MANIFEST
OS_VA2: 20/
OS_VA3: 20/
OS_VA1: 20/
OU_VA: 20/
OD_SPHERE: +1.00
OD_CYLINDER: -0.50
OD_VA1: 20/
OS_AXIS: 115
OD_VA1: 20/20-2
OS_ADD: +2.50
OS_VA2: 20/20-2
OS_VA3: 20/
OD_VA3: 20/
OD_VA3: 20/
OS_VA1: 20/20-1
OU_VA: 20/
OD_ADD: +2.50
OD_VA2: 20/
OD_VA2: 20/25+2
OS_SPHERE: PLANO
OS_CYLINDER: -0.50
OD_AXIS: 063

## 2019-09-11 ASSESSMENT — SPHEQUIV_DERIVED
OD_SPHEQUIV: 0.75
OS_SPHEQUIV: 0.75
OD_SPHEQUIV: 1.25

## 2019-09-11 ASSESSMENT — REFRACTION_AUTOREFRACTION
OD_CYLINDER: -0.50
OD_SPHERE: +1.50
OS_CYLINDER: -0.50
OS_SPHERE: +1.00
OS_AXIS: 138
OD_AXIS: 041

## 2019-09-11 ASSESSMENT — PACHYMETRY
OD_CT_CORRECTION: -5
OD_CT_UM: 610
OS_CT_UM: 601
OS_CT_CORRECTION: -4

## 2019-09-11 ASSESSMENT — CONFRONTATIONAL VISUAL FIELD TEST (CVF)
OS_FINDINGS: FULL
OD_FINDINGS: FULL

## 2019-09-11 ASSESSMENT — VISUAL ACUITY
OD_BCVA: 20/25
OS_BCVA: 20/25+1

## 2020-10-20 ENCOUNTER — DOCTOR'S OFFICE (OUTPATIENT)
Dept: URBAN - NONMETROPOLITAN AREA CLINIC 1 | Facility: CLINIC | Age: 70
Setting detail: OPHTHALMOLOGY
End: 2020-10-20
Payer: COMMERCIAL

## 2020-10-20 VITALS — HEIGHT: 55 IN

## 2020-10-20 DIAGNOSIS — E11.3291: ICD-10-CM

## 2020-10-20 DIAGNOSIS — H40.053: ICD-10-CM

## 2020-10-20 DIAGNOSIS — E11.3292: ICD-10-CM

## 2020-10-20 DIAGNOSIS — H43.813: ICD-10-CM

## 2020-10-20 DIAGNOSIS — Z96.1: ICD-10-CM

## 2020-10-20 PROCEDURE — 92133 CPTRZD OPH DX IMG PST SGM ON: CPT | Performed by: OPHTHALMOLOGY

## 2020-10-20 PROCEDURE — 92014 COMPRE OPH EXAM EST PT 1/>: CPT | Performed by: OPHTHALMOLOGY

## 2020-10-20 PROCEDURE — 76514 ECHO EXAM OF EYE THICKNESS: CPT | Performed by: OPHTHALMOLOGY

## 2020-10-20 ASSESSMENT — REFRACTION_CURRENTRX
OD_VPRISM_DIRECTION: BF
OS_CYLINDER: -0.50
OS_ADD: +2.75
OS_VPRISM_DIRECTION: BF
OD_SPHERE: +1.00
OS_SPHERE: PLANO
OD_OVR_VA: 20/
OD_ADD: +2.75
OS_AXIS: 117
OS_OVR_VA: 20/
OD_CYLINDER: -0.50
OD_AXIS: 061

## 2020-10-20 ASSESSMENT — SPHEQUIV_DERIVED
OD_SPHEQUIV: 0.75
OS_SPHEQUIV: 0.75
OD_SPHEQUIV: 1.25

## 2020-10-20 ASSESSMENT — REFRACTION_AUTOREFRACTION
OD_SPHERE: +1.50
OD_AXIS: 041
OD_CYLINDER: -0.50
OS_AXIS: 138
OS_CYLINDER: -0.50
OS_SPHERE: +1.00

## 2020-10-20 ASSESSMENT — TONOMETRY
OD_IOP_MMHG: 18
OS_IOP_MMHG: 17

## 2020-10-20 ASSESSMENT — REFRACTION_MANIFEST
OD_VA2: 20/25+2
OD_SPHERE: +1.00
OS_AXIS: 115
OD_ADD: +2.50
OS_SPHERE: PLANO
OD_VA1: 20/20-2
OD_AXIS: 063
OD_CYLINDER: -0.50
OS_CYLINDER: -0.50
OS_ADD: +2.50
OS_VA1: 20/20-1
OS_VA2: 20/20-2

## 2020-10-20 ASSESSMENT — PACHYMETRY
OS_CT_UM: 567
OD_CT_CORRECTION: -2
OD_CT_UM: 574
OS_CT_CORRECTION: -1

## 2020-10-20 ASSESSMENT — CONFRONTATIONAL VISUAL FIELD TEST (CVF)
OD_FINDINGS: FULL
OS_FINDINGS: FULL

## 2020-10-20 ASSESSMENT — VISUAL ACUITY
OS_BCVA: 20/20-2
OD_BCVA: 20/25-1

## 2020-12-22 ENCOUNTER — DOCTOR'S OFFICE (OUTPATIENT)
Dept: URBAN - NONMETROPOLITAN AREA CLINIC 1 | Facility: CLINIC | Age: 70
Setting detail: OPHTHALMOLOGY
End: 2020-12-22
Payer: COMMERCIAL

## 2020-12-22 VITALS — HEIGHT: 55 IN

## 2020-12-22 DIAGNOSIS — H52.03: ICD-10-CM

## 2020-12-22 DIAGNOSIS — H52.4: ICD-10-CM

## 2020-12-22 PROCEDURE — 92015 DETERMINE REFRACTIVE STATE: CPT | Performed by: OPTOMETRIST

## 2020-12-22 PROCEDURE — S0621 ROUTINE OPHTHALMOLOGICAL EXA: HCPCS | Performed by: OPTOMETRIST

## 2020-12-22 ASSESSMENT — REFRACTION_CURRENTRX
OS_VPRISM_DIRECTION: BF
OD_ADD: +2.75
OD_OVR_VA: 20/
OD_SPHERE: +1.00
OS_OVR_VA: 20/
OD_AXIS: 061
OD_CYLINDER: -0.50
OS_SPHERE: PLANO
OS_CYLINDER: -0.50
OD_VPRISM_DIRECTION: BF
OS_ADD: +2.75
OS_AXIS: 117

## 2020-12-22 ASSESSMENT — REFRACTION_MANIFEST
OS_CYLINDER: -0.75
OS_ADD: +2.50
OD_VA2: 20/25+2
OD_SPHERE: +1.00
OS_AXIS: 135
OS_VA2: 20/20-2
OD_AXIS: 063
OS_SPHERE: +0.50
OD_CYLINDER: -0.50
OS_VA1: 20/20-1
OD_VA1: 20/20-2
OD_ADD: +2.50

## 2020-12-22 ASSESSMENT — CONFRONTATIONAL VISUAL FIELD TEST (CVF)
OD_FINDINGS: FULL
OS_FINDINGS: FULL

## 2020-12-22 ASSESSMENT — REFRACTION_AUTOREFRACTION
OS_SPHERE: +0.50
OS_CYLINDER: -0.75
OD_SPHERE: +1.00
OS_AXIS: 135

## 2020-12-22 ASSESSMENT — SPHEQUIV_DERIVED
OS_SPHEQUIV: 0.125
OD_SPHEQUIV: 0.75
OS_SPHEQUIV: 0.125

## 2020-12-22 ASSESSMENT — AXIALLENGTH_DERIVED
OS_AL: 22.7481
OD_AL: 22.6502
OS_AL: 22.7481

## 2020-12-22 ASSESSMENT — VISUAL ACUITY
OS_BCVA: 20/25-2
OD_BCVA: 20/60-1

## 2020-12-22 ASSESSMENT — KERATOMETRY
OD_K2POWER_DIOPTERS: 45.75
OD_K1POWER_DIOPTERS: 45.00
OS_K2POWER_DIOPTERS: 46.25
OS_K1POWER_DIOPTERS: 45.25
OS_AXISANGLE_DEGREES: 169
OD_AXISANGLE_DEGREES: 008

## 2021-04-23 ENCOUNTER — DOCTOR'S OFFICE (OUTPATIENT)
Dept: URBAN - NONMETROPOLITAN AREA CLINIC 1 | Facility: CLINIC | Age: 71
Setting detail: OPHTHALMOLOGY
End: 2021-04-23
Payer: MEDICARE

## 2021-04-23 DIAGNOSIS — Z79.4: ICD-10-CM

## 2021-04-23 DIAGNOSIS — Z96.1: ICD-10-CM

## 2021-04-23 DIAGNOSIS — E11.3293: ICD-10-CM

## 2021-04-23 DIAGNOSIS — H43.823: ICD-10-CM

## 2021-04-23 DIAGNOSIS — H43.813: ICD-10-CM

## 2021-04-23 DIAGNOSIS — H40.053: ICD-10-CM

## 2021-04-23 PROCEDURE — 92134 CPTRZ OPH DX IMG PST SGM RTA: CPT | Performed by: OPHTHALMOLOGY

## 2021-04-23 PROCEDURE — 92014 COMPRE OPH EXAM EST PT 1/>: CPT | Performed by: OPHTHALMOLOGY

## 2021-04-23 ASSESSMENT — KERATOMETRY
OS_K1POWER_DIOPTERS: 45.25
OD_K1POWER_DIOPTERS: 45.00
OD_AXISANGLE_DEGREES: 008
OS_K2POWER_DIOPTERS: 46.25
OD_K2POWER_DIOPTERS: 45.75
OS_AXISANGLE_DEGREES: 169

## 2021-04-23 ASSESSMENT — REFRACTION_CURRENTRX
OD_VPRISM_DIRECTION: BF
OD_CYLINDER: -0.50
OD_OVR_VA: 20/
OD_ADD: +2.75
OS_AXIS: 104
OS_ADD: +2.75
OS_CYLINDER: -0.50
OS_SPHERE: PLANO
OD_AXIS: 062
OD_SPHERE: +1.00
OS_VPRISM_DIRECTION: BF
OS_OVR_VA: 20/

## 2021-04-23 ASSESSMENT — REFRACTION_MANIFEST
OD_ADD: +2.50
OS_VA1: 20/20-1
OS_AXIS: 135
OS_ADD: +2.50
OD_AXIS: 063
OD_SPHERE: +1.00
OS_CYLINDER: -0.75
OD_CYLINDER: -0.50
OS_SPHERE: +0.50
OS_VA2: 20/20-2
OD_VA2: 20/25+2
OD_VA1: 20/20-2

## 2021-04-23 ASSESSMENT — SPHEQUIV_DERIVED
OS_SPHEQUIV: 0.125
OS_SPHEQUIV: 0.125
OD_SPHEQUIV: 0.75

## 2021-04-23 ASSESSMENT — AXIALLENGTH_DERIVED
OS_AL: 22.7481
OS_AL: 22.7481
OD_AL: 22.6502

## 2021-04-23 ASSESSMENT — REFRACTION_AUTOREFRACTION
OS_CYLINDER: -0.75
OS_AXIS: 135
OS_SPHERE: +0.50
OD_SPHERE: +1.00

## 2021-04-23 ASSESSMENT — VISUAL ACUITY
OD_BCVA: 20/25-1
OS_BCVA: 20/20

## 2021-04-23 ASSESSMENT — CONFRONTATIONAL VISUAL FIELD TEST (CVF)
OD_FINDINGS: FULL
OS_FINDINGS: FULL

## 2022-08-19 ENCOUNTER — DOCTOR'S OFFICE (OUTPATIENT)
Dept: URBAN - NONMETROPOLITAN AREA CLINIC 1 | Facility: CLINIC | Age: 72
Setting detail: OPHTHALMOLOGY
End: 2022-08-19
Payer: MEDICARE

## 2022-08-19 DIAGNOSIS — Z79.4: ICD-10-CM

## 2022-08-19 DIAGNOSIS — Z96.1: ICD-10-CM

## 2022-08-19 DIAGNOSIS — H40.053: ICD-10-CM

## 2022-08-19 DIAGNOSIS — E11.3293: ICD-10-CM

## 2022-08-19 DIAGNOSIS — H35.373: ICD-10-CM

## 2022-08-19 PROBLEM — H52.03 HYPEROPIA; BOTH EYES: Status: ACTIVE | Noted: 2020-12-22

## 2022-08-19 PROBLEM — H43.813 POST VITREOUS DETACHMENT; BOTH EYES: Status: ACTIVE | Noted: 2018-06-27

## 2022-08-19 PROBLEM — H43.823 VITREOMACULAR ADHESION; BOTH EYES: Status: ACTIVE | Noted: 2018-12-03

## 2022-08-19 PROCEDURE — 92134 CPTRZ OPH DX IMG PST SGM RTA: CPT | Performed by: OPHTHALMOLOGY

## 2022-08-19 PROCEDURE — 99214 OFFICE O/P EST MOD 30 MIN: CPT | Performed by: OPHTHALMOLOGY

## 2022-08-19 ASSESSMENT — REFRACTION_CURRENTRX
OD_SPHERE: +1.00
OS_VPRISM_DIRECTION: BF
OS_SPHERE: PLANO
OS_OVR_VA: 20/
OS_CYLINDER: -0.50
OS_ADD: +2.75
OD_CYLINDER: -0.50
OS_AXIS: 104
OD_OVR_VA: 20/
OD_AXIS: 062
OD_VPRISM_DIRECTION: BF
OD_ADD: +2.75

## 2022-08-19 ASSESSMENT — REFRACTION_MANIFEST
OS_VA1: 20/20-1
OD_SPHERE: +1.00
OS_CYLINDER: -0.75
OD_VA1: 20/20-2
OS_VA2: 20/20-2
OS_ADD: +2.50
OD_AXIS: 063
OD_VA2: 20/25+2
OD_ADD: +2.50
OS_AXIS: 135
OS_SPHERE: +0.50
OD_CYLINDER: -0.50

## 2022-08-19 ASSESSMENT — CONFRONTATIONAL VISUAL FIELD TEST (CVF)
OD_FINDINGS: FULL
OS_FINDINGS: FULL

## 2022-08-19 ASSESSMENT — AXIALLENGTH_DERIVED
OS_AL: 22.663
OS_AL: 22.663
OD_AL: 22.6502
OD_AL: 22.9227

## 2022-08-19 ASSESSMENT — REFRACTION_AUTOREFRACTION
OS_AXIS: 158
OD_SPHERE: +0.50
OS_CYLINDER: -0.75
OS_SPHERE: +0.50
OD_CYLINDER: -1.00
OD_AXIS: 134

## 2022-08-19 ASSESSMENT — VISUAL ACUITY
OD_BCVA: 20/20-2
OS_BCVA: 20/20

## 2022-08-19 ASSESSMENT — KERATOMETRY
OS_K1POWER_DIOPTERS: 46.00
OS_AXISANGLE_DEGREES: 156
OD_AXISANGLE_DEGREES: 008
OS_K2POWER_DIOPTERS: 46.00
OD_K2POWER_DIOPTERS: 45.75
OD_K1POWER_DIOPTERS: 45.00

## 2022-08-19 ASSESSMENT — SPHEQUIV_DERIVED
OD_SPHEQUIV: 0.75
OD_SPHEQUIV: 0
OS_SPHEQUIV: 0.125
OS_SPHEQUIV: 0.125

## 2024-01-08 ENCOUNTER — MEDICAL CORRESPONDENCE (OUTPATIENT)
Dept: HEALTH INFORMATION MANAGEMENT | Facility: CLINIC | Age: 74
End: 2024-01-08

## 2024-01-15 ENCOUNTER — TRANSCRIBE ORDERS (OUTPATIENT)
Dept: OTHER | Age: 74
End: 2024-01-15

## 2024-01-15 DIAGNOSIS — S36.13XD INJURY OF BILE DUCT, SUBSEQUENT ENCOUNTER: Primary | ICD-10-CM

## 2024-01-24 ENCOUNTER — TELEPHONE (OUTPATIENT)
Dept: TRANSPLANT | Facility: CLINIC | Age: 74
End: 2024-01-24
Payer: MEDICARE

## 2024-01-24 NOTE — TELEPHONE ENCOUNTER
Tried to call patient to set up a consult with Dr. Alejandro Becker for 2 months after her last bile duct procedure and get permission to have her records from [place] transferred here. Left voicemail for patient. Will try to call again this afternoon.     3:51 - spoke with Kimberlyn and her daughter. Got consult with Dr. Becker scheduled. Permission to obtain outside medical records given. Mychart set up.     Adriana Sanches  RN, BSN  RN Clinic Supervisor:   [facility]  [facility]   [email or web address]  Phone: [phone]   Fax: [phone]

## 2024-02-19 ENCOUNTER — OFFICE VISIT (OUTPATIENT)
Dept: TRANSPLANT | Facility: CLINIC | Age: 74
End: 2024-02-19
Attending: TRANSPLANT SURGERY
Payer: COMMERCIAL

## 2024-02-19 VITALS
HEIGHT: 64 IN | BODY MASS INDEX: 22.5 KG/M2 | WEIGHT: 131.8 LBS | OXYGEN SATURATION: 100 % | HEART RATE: 102 BPM | DIASTOLIC BLOOD PRESSURE: 71 MMHG | TEMPERATURE: 97.9 F | SYSTOLIC BLOOD PRESSURE: 145 MMHG

## 2024-02-19 DIAGNOSIS — S36.13XA INJURY OF BILE DUCT, INITIAL ENCOUNTER: Primary | ICD-10-CM

## 2024-02-19 PROCEDURE — G0463 HOSPITAL OUTPT CLINIC VISIT: HCPCS | Performed by: TRANSPLANT SURGERY

## 2024-02-19 PROCEDURE — 99202 OFFICE O/P NEW SF 15 MIN: CPT | Performed by: TRANSPLANT SURGERY

## 2024-02-19 RX ORDER — FENOFIBRATE 160 MG/1
160 TABLET ORAL EVERY MORNING
COMMUNITY
Start: 2024-01-02

## 2024-02-19 RX ORDER — LISINOPRIL 40 MG/1
40 TABLET ORAL EVERY MORNING
COMMUNITY
Start: 2022-09-27

## 2024-02-19 RX ORDER — FUROSEMIDE 20 MG
20 TABLET ORAL DAILY PRN
COMMUNITY
Start: 2024-02-07

## 2024-02-19 RX ORDER — LANCETS
EACH MISCELLANEOUS
COMMUNITY
Start: 2023-10-18

## 2024-02-19 RX ORDER — ASPIRIN 81 MG/1
81 TABLET ORAL DAILY
COMMUNITY
Start: 2024-01-02

## 2024-02-19 RX ORDER — SIMVASTATIN 40 MG
40 TABLET ORAL AT BEDTIME
COMMUNITY
Start: 2024-01-02

## 2024-02-19 RX ORDER — ERGOCALCIFEROL (VITAMIN D2) 10 MCG
400 TABLET ORAL EVERY OTHER DAY
COMMUNITY
Start: 2024-01-02

## 2024-02-19 RX ORDER — METFORMIN HYDROCHLORIDE EXTENDED-RELEASE TABLETS 1000 MG/1
500 TABLET, FILM COATED, EXTENDED RELEASE ORAL 2 TIMES DAILY
Status: ON HOLD | COMMUNITY
End: 2024-07-11

## 2024-02-19 RX ORDER — LORATADINE 10 MG/1
10 TABLET ORAL EVERY MORNING
COMMUNITY
Start: 2024-01-02

## 2024-02-19 RX ORDER — METHYLPREDNISOLONE 4 MG
1000 TABLET, DOSE PACK ORAL EVERY MORNING
COMMUNITY
Start: 2024-01-02

## 2024-02-19 RX ORDER — SIMETHICONE 80 MG
80 TABLET,CHEWABLE ORAL EVERY 6 HOURS PRN
Status: ON HOLD | COMMUNITY
End: 2024-07-11

## 2024-02-19 RX ORDER — ACETAMINOPHEN 325 MG/1
650 TABLET ORAL EVERY 4 HOURS PRN
Status: ON HOLD | COMMUNITY
End: 2024-07-18

## 2024-02-19 RX ORDER — BLOOD SUGAR DIAGNOSTIC
STRIP MISCELLANEOUS
COMMUNITY
Start: 2023-10-17

## 2024-02-19 RX ORDER — GABAPENTIN 600 MG/1
600 TABLET ORAL 2 TIMES DAILY
COMMUNITY
Start: 2022-10-18

## 2024-02-19 RX ORDER — APIXABAN 5 MG/1
5 TABLET, FILM COATED ORAL 2 TIMES DAILY
COMMUNITY
Start: 2024-02-14 | End: 2024-07-09

## 2024-02-19 RX ORDER — ASCORBIC ACID 250 MG
1000 TABLET,CHEWABLE ORAL DAILY
COMMUNITY
End: 2024-07-09

## 2024-02-19 RX ORDER — GLIMEPIRIDE 1 MG/1
1 TABLET ORAL
COMMUNITY
Start: 2024-02-06 | End: 2025-02-10

## 2024-02-19 NOTE — NURSING NOTE
"Chief Complaint   Patient presents with    Follow Up     Consult for non-transplant surgery       Vital signs:  Temp: 97.9  F (36.6  C) Temp src: Oral BP: (!) 145/71 Pulse: 102     SpO2: 100 %     Height: 161.3 cm (5' 3.5\") Weight: 59.8 kg (131 lb 12.8 oz)  Estimated body mass index is 22.98 kg/m  as calculated from the following:    Height as of this encounter: 1.613 m (5' 3.5\").    Weight as of this encounter: 59.8 kg (131 lb 12.8 oz).      Jacob Luna RN on 2/19/2024 at 10:12 AM    "

## 2024-02-19 NOTE — LETTER
"    2/19/2024         RE: Rocío Russell  [address] [address]        Dear Colleague,    Thank you for referring your patient, Rocío Russell, to the [facility]PL[facility]. Please see a copy of my visit note below.    Patient was referred by Dr. Gemini MD    I had to run to the operating room for an emergent transplant surgery so did not have a long time to see the patient.  However I did review all the records the night before.    Briefly:  Patient has postcholecystectomy benign biliary stricture following a complete transection of the bile duct.  She has a biliary tube and a G-tube.  The biliary tree was draining anywhere from 400 to 600 mL.  She is refeeding the bile.  She came in a wheelchair.  She looks frail.  She also looks deconditioned and malnourished.    BP (!) 145/71   Pulse 102   Temp 97.9  F (36.6  C) (Oral)   Ht 1.613 m (5' 3.5\")   Wt 59.8 kg (131 lb 12.8 oz)   SpO2 100%   BMI 22.98 kg/m        Examination abdomen: The scar is healing well.  The tubes are in place.    Clinical impression:    Postcholecystectomy benign biliary stricture    Recommendation:    #1 full cardiac workup  #2 intense physical therapy and nutritional therapy  #3.  Cardiology workup at [place]    Once she is better, would recommend seeing preanesthesia clinic and would recommend exploratory laparotomy and hepaticojejunostomy Tamiko-en-Y.    I explained the above details to the patient's daughter and the patient.      Again, thank you for allowing me to participate in the care of your patient.        Sincerely,        Alejandro Becker MD  "

## 2024-02-26 NOTE — PROGRESS NOTES
"Patient was referred by Dr. Gemini MD    I had to run to the operating room for an emergent transplant surgery so did not have a long time to see the patient.  However I did review all the records the night before.    Briefly:  Patient has postcholecystectomy benign biliary stricture following a complete transection of the bile duct.  She has a biliary tube and a G-tube.  The biliary tree was draining anywhere from 400 to 600 mL.  She is refeeding the bile.  She came in a wheelchair.  She looks frail.  She also looks deconditioned and malnourished.    BP (!) 145/71   Pulse 102   Temp 97.9  F (36.6  C) (Oral)   Ht 1.613 m (5' 3.5\")   Wt 59.8 kg (131 lb 12.8 oz)   SpO2 100%   BMI 22.98 kg/m        Examination abdomen: The scar is healing well.  The tubes are in place.    Clinical impression:    Postcholecystectomy benign biliary stricture    Recommendation:    #1 full cardiac workup  #2 intense physical therapy and nutritional therapy  #3.  Cardiology workup at [place]    Once she is better, would recommend seeing preanesthesia clinic and would recommend exploratory laparotomy and hepaticojejunostomy Tamiko-en-Y.    I explained the above details to the patient's daughter and the patient.  "

## 2024-03-10 ENCOUNTER — HEALTH MAINTENANCE LETTER (OUTPATIENT)
Age: 74
End: 2024-03-10

## 2024-06-10 ENCOUNTER — OFFICE VISIT (OUTPATIENT)
Dept: TRANSPLANT | Facility: CLINIC | Age: 74
End: 2024-06-10
Attending: TRANSPLANT SURGERY
Payer: COMMERCIAL

## 2024-06-10 VITALS
SYSTOLIC BLOOD PRESSURE: 145 MMHG | WEIGHT: 134.1 LBS | RESPIRATION RATE: 16 BRPM | BODY MASS INDEX: 22.9 KG/M2 | OXYGEN SATURATION: 99 % | DIASTOLIC BLOOD PRESSURE: 73 MMHG | HEIGHT: 64 IN | HEART RATE: 99 BPM

## 2024-06-10 DIAGNOSIS — S36.13XA INJURY OF BILE DUCT, INITIAL ENCOUNTER: Primary | ICD-10-CM

## 2024-06-10 DIAGNOSIS — R07.89 OTHER CHEST PAIN: ICD-10-CM

## 2024-06-10 PROCEDURE — 99214 OFFICE O/P EST MOD 30 MIN: CPT | Performed by: TRANSPLANT SURGERY

## 2024-06-10 PROCEDURE — G0463 HOSPITAL OUTPT CLINIC VISIT: HCPCS | Performed by: TRANSPLANT SURGERY

## 2024-06-10 RX ORDER — AMITRIPTYLINE HYDROCHLORIDE 10 MG/1
10 TABLET ORAL AT BEDTIME
COMMUNITY
Start: 2024-05-01

## 2024-06-10 RX ORDER — FERROUS SULFATE 325(65) MG
325 TABLET ORAL
Status: ON HOLD | COMMUNITY
Start: 2024-04-10 | End: 2024-07-18

## 2024-06-10 RX ORDER — OXYCODONE HYDROCHLORIDE 5 MG/1
5 TABLET ORAL EVERY 6 HOURS PRN
COMMUNITY
Start: 2024-05-28 | End: 2024-06-27

## 2024-06-10 NOTE — NURSING NOTE
Frailty Assessment Note: Not frail    Overall Score 1/5    Weight:   Wt Readings from Last 3 Encounters:   06/10/24 60.8 kg (134 lb 1.6 oz)   02/19/24 59.8 kg (131 lb 12.8 oz)     Height: 161.3 cm    Weight lost over 10lbs in last year: No    Reported Exhaustion/Energy Levels: Mild    Slowness: 7 seconds / 15 feet walking (average of 2 attempts)    Low Activity Level Score: 0      Strength: 20 (average of 3 trials on dominant hand)      GWEN MERAZ RN on 6/10/2024 at 11:22 AM

## 2024-06-10 NOTE — NURSING NOTE
"Chief Complaint   Patient presents with    Consult For     Biliary leak       BP (!) 145/73 (BP Location: Right arm, Patient Position: Sitting, Cuff Size: Adult Large)   Pulse 99   Resp 16   Ht 1.613 m (5' 3.5\")   Wt 60.8 kg (134 lb 1.6 oz)   SpO2 99%   BMI 23.38 kg/m      GWEN MERAZ, RN on 6/10/2024 at 10:38 AM    "

## 2024-06-10 NOTE — PROGRESS NOTES
"Please see my previous notes.    Patient has a postcholecystectomy benign biliary stricture.  She does have a biliary tube which is draining almost a liter a day.  They are refeeding the bile.  Since I last saw her, she had a fracture of the left lower leg which is since healed.  Her nutrition is much improved.  She is able to walk with the help of walker.  She is also practicing incentive spirometry.  No fevers.  No chest pain.  No pulmonary symptoms.    BP (!) 145/73 (BP Location: Right arm, Patient Position: Sitting, Cuff Size: Adult Large)   Pulse 99   Resp 16   Ht 1.613 m (5' 3.5\")   Wt 60.8 kg (134 lb 1.6 oz)   SpO2 99%   BMI 23.38 kg/m      Examination of abdomen:  The abdomen is soft.  There is a midline scar.  The site of the biliary tube and the G-tube looks good.    Clinical impression:    Postcholecystectomy benign biliary stricture type    Recommendations:    Tamiko-en-Y hepaticojejunostomy.      I spent time with the patient and the daughter and explained to them the surgical procedure, we discussed the surgical complication which she will keep included but were not limited to intraoperative death, postoperative death, biliary leak, and any strictures.  I also explained to them that she will need to have a biliary tube in place for 6 to 8 weeks after the surgery.  I told him that the hospital stay would be approximately 2 weeks including a couple of days in intensive care unit    The patient and the family understand the risks and would like to proceed to surgery    Would recommend:  Liver function test today  Stress test of the heart  PACs clinic consultation      "

## 2024-06-10 NOTE — LETTER
"6/10/2024      Rocío Russell  [address] [address]      Dear Colleague,    Thank you for referring your patient, Rocío Russell, to the [facility] TR[facility]PL[facility]. Please see a copy of my visit note below.    Please see my previous notes.    Patient has a postcholecystectomy benign biliary stricture.  She does have a biliary tube which is draining almost a liter a day.  They are refeeding the bile.  Since I last saw her, she had a fracture of the left lower leg which is since healed.  Her nutrition is much improved.  She is able to walk with the help of walker.  She is also practicing incentive spirometry.  No fevers.  No chest pain.  No pulmonary symptoms.    BP (!) 145/73 (BP Location: Right arm, Patient Position: Sitting, Cuff Size: Adult Large)   Pulse 99   Resp 16   Ht 1.613 m (5' 3.5\")   Wt 60.8 kg (134 lb 1.6 oz)   SpO2 99%   BMI 23.38 kg/m      Examination of abdomen:  The abdomen is soft.  There is a midline scar.  The site of the biliary tube and the G-tube looks good.    Clinical impression:    Postcholecystectomy benign biliary stricture type    Recommendations:    Tamiko-en-Y hepaticojejunostomy.      I spent time with the patient and the daughter and explained to them the surgical procedure, we discussed the surgical complication which she will keep included but were not limited to intraoperative death, postoperative death, biliary leak, and any strictures.  I also explained to them that she will need to have a biliary tube in place for 6 to 8 weeks after the surgery.  I told him that the hospital stay would be approximately 2 weeks including a couple of days in intensive care unit    The patient and the family understand the risks and would like to proceed to surgery    Would recommend:  Liver function test today  Stress test of the heart  PACs clinic consultation      Again, thank you for allowing me to participate in the care of your patient.  "       Sincerely,        Alejandro Becker MD

## 2024-06-14 ENCOUNTER — PREP FOR PROCEDURE (OUTPATIENT)
Dept: TRANSPLANT | Facility: CLINIC | Age: 74
End: 2024-06-14
Payer: MEDICARE

## 2024-06-14 ENCOUNTER — HOSPITAL ENCOUNTER (INPATIENT)
Facility: CLINIC | Age: 74
Setting detail: SURGERY ADMIT
End: 2024-06-14
Attending: TRANSPLANT SURGERY | Admitting: TRANSPLANT SURGERY
Payer: COMMERCIAL

## 2024-06-14 DIAGNOSIS — S36.13XA: Primary | ICD-10-CM

## 2024-06-20 NOTE — TELEPHONE ENCOUNTER
FUTURE VISIT INFORMATION      SURGERY INFORMATION:  Date: 7/17/24  Location: [facility]  Surgeon:  Alejandro Becker MD   Anesthesia Type:  General with block  Procedure: HEPATICOJEJUNOSTOMY   Consult: ov 6/10    RECORDS REQUESTED FROM:       Primary Care Provider: Niki Hoyos APRN-CNP - [facility]    Most recent EKG+ Tracing: 3/19/24- [facility]    Most recent ECHO: 4/2/24- [facility]    Most recent Cardiac Stress Test: 6/10/24

## 2024-07-03 ENCOUNTER — TELEPHONE (OUTPATIENT)
Dept: TRANSPLANT | Facility: CLINIC | Age: 74
End: 2024-07-03

## 2024-07-03 ENCOUNTER — PRE VISIT (OUTPATIENT)
Dept: SURGERY | Facility: CLINIC | Age: 74
End: 2024-07-03

## 2024-07-03 ENCOUNTER — PREP FOR PROCEDURE (OUTPATIENT)
Dept: TRANSPLANT | Facility: CLINIC | Age: 74
End: 2024-07-03

## 2024-07-03 DIAGNOSIS — K83.8 BILE DUCT LEAK: Primary | ICD-10-CM

## 2024-07-03 DIAGNOSIS — S36.13XA: ICD-10-CM

## 2024-07-03 NOTE — TELEPHONE ENCOUNTER
[facility] Call Center    Phone Message    May a detailed message be left on voicemail: yes     Reason for Call: Other: Patients daughter called in, patient daughter stated they were told that patients surgery was cancelled. She is very frustrated by this and wants to talk to someone and know why it was cancelled. Was unable to reach Adriana at time of call, patients daughter was advised I will send a HP message to Adriana for her to reach out to patients daughter when she is available. Daughter would like to be called back at [phone]     Action Taken: Message routed to:  Other: Adriana HEREDIA    Travel Screening: Not Applicable

## 2024-07-03 NOTE — TELEPHONE ENCOUNTER
FUTURE VISIT INFORMATION      SURGERY INFORMATION:  Date: 2024      RECORDS REQUESTED FROM:       Primary Care Provider: Niki Hoyos    Pertinent Medical History:    Most recent EKG+ Tracin2024    Most recent ECHO: [facility] 5/3/2024     Most recent Cardiac Stress Test: 6/10/2024

## 2024-07-06 ENCOUNTER — ANESTHESIA EVENT (OUTPATIENT)
Dept: SURGERY | Facility: CLINIC | Age: 74
DRG: 329 | End: 2024-07-06
Payer: MEDICARE

## 2024-07-08 LAB
ABO/RH(D): NORMAL
ANTIBODY SCREEN: NEGATIVE
SPECIMEN EXPIRATION DATE: NORMAL

## 2024-07-09 ENCOUNTER — LAB (OUTPATIENT)
Dept: LAB | Facility: CLINIC | Age: 74
DRG: 329 | End: 2024-07-09
Attending: PHYSICIAN ASSISTANT
Payer: MEDICARE

## 2024-07-09 ENCOUNTER — PRE VISIT (OUTPATIENT)
Dept: SURGERY | Facility: CLINIC | Age: 74
End: 2024-07-09

## 2024-07-09 ENCOUNTER — VIRTUAL VISIT (OUTPATIENT)
Dept: SURGERY | Facility: CLINIC | Age: 74
End: 2024-07-09
Payer: MEDICARE

## 2024-07-09 ENCOUNTER — HOSPITAL ENCOUNTER (OUTPATIENT)
Dept: CARDIOLOGY | Facility: CLINIC | Age: 74
Discharge: HOME | DRG: 329 | End: 2024-07-09
Attending: TRANSPLANT SURGERY
Payer: MEDICARE

## 2024-07-09 VITALS — BODY MASS INDEX: 22.88 KG/M2 | HEIGHT: 64 IN | WEIGHT: 134 LBS

## 2024-07-09 DIAGNOSIS — Z01.818 PRE-OPERATIVE EXAMINATION: Primary | ICD-10-CM

## 2024-07-09 DIAGNOSIS — S36.13XD INJURY OF BILE DUCT, SUBSEQUENT ENCOUNTER: ICD-10-CM

## 2024-07-09 DIAGNOSIS — S36.13XA INJURY OF BILE DUCT, INITIAL ENCOUNTER: ICD-10-CM

## 2024-07-09 DIAGNOSIS — R07.89 OTHER CHEST PAIN: ICD-10-CM

## 2024-07-09 DIAGNOSIS — E11.9 TYPE 2 DIABETES MELLITUS WITHOUT COMPLICATION, WITHOUT LONG-TERM CURRENT USE OF INSULIN (H): ICD-10-CM

## 2024-07-09 DIAGNOSIS — Z01.818 PRE-OPERATIVE EXAMINATION: ICD-10-CM

## 2024-07-09 LAB
ALBUMIN SERPL BCG-MCNC: 3.6 G/DL (ref 3.5–5.2)
ALP SERPL-CCNC: 278 U/L (ref 40–150)
ALT SERPL W P-5'-P-CCNC: 50 U/L (ref 0–50)
ANION GAP SERPL CALCULATED.3IONS-SCNC: 12 MMOL/L (ref 7–15)
AST SERPL W P-5'-P-CCNC: 34 U/L (ref 0–45)
BILIRUB SERPL-MCNC: 0.2 MG/DL
BUN SERPL-MCNC: 31.4 MG/DL (ref 8–23)
CALCIUM SERPL-MCNC: 9.8 MG/DL (ref 8.8–10.2)
CHLORIDE SERPL-SCNC: 101 MMOL/L (ref 98–107)
CREAT SERPL-MCNC: 0.65 MG/DL (ref 0.51–0.95)
CV STRESS CURRENT BP HE: NORMAL
CV STRESS CURRENT HR HE: 106
CV STRESS CURRENT HR HE: 109
CV STRESS CURRENT HR HE: 112
CV STRESS CURRENT HR HE: 112
CV STRESS CURRENT HR HE: 114
CV STRESS CURRENT HR HE: 116
CV STRESS CURRENT HR HE: 122
CV STRESS CURRENT HR HE: 123
CV STRESS CURRENT HR HE: 123
CV STRESS CURRENT HR HE: 137
CV STRESS CURRENT HR HE: 90
CV STRESS CURRENT HR HE: 91
CV STRESS CURRENT HR HE: 91
CV STRESS CURRENT HR HE: 92
CV STRESS CURRENT HR HE: 95
CV STRESS CURRENT HR HE: 97
CV STRESS CURRENT HR HE: 99
CV STRESS DEVIATION TIME HE: NORMAL
CV STRESS ECHO PERCENT HR HE: NORMAL
CV STRESS EXERCISE STAGE HE: NORMAL
CV STRESS EXERCISE STAGE REACHED HE: NORMAL
CV STRESS FINAL RESTING BP HE: NORMAL
CV STRESS FINAL RESTING HR HE: 90
CV STRESS MAX HR HE: 137
CV STRESS MAX TREADMILL GRADE HE: 0
CV STRESS MAX TREADMILL SPEED HE: 0
CV STRESS PEAK DIA BP HE: NORMAL
CV STRESS PEAK SYS BP HE: NORMAL
CV STRESS PHASE HE: NORMAL
CV STRESS PROTOCOL HE: NORMAL
CV STRESS REASON STOPPED HE: NORMAL
CV STRESS ST DEVIATION AMOUNT HE: NORMAL
CV STRESS ST DEVIATION ELEVATION HE: NORMAL
CV STRESS ST EVELATION AMOUNT HE: NORMAL
CV STRESS SYMPTOMS HE: NORMAL
CV STRESS TEST TYPE HE: NORMAL
CV STRESS TOTAL STAGE TIME MIN 1 HE: NORMAL
DEPRECATED HCO3 PLAS-SCNC: 23 MMOL/L (ref 22–29)
EGFRCR SERPLBLD CKD-EPI 2021: >90 ML/MIN/1.73M2
ERYTHROCYTE [DISTWIDTH] IN BLOOD BY AUTOMATED COUNT: 17.5 % (ref 10–15)
GLUCOSE SERPL-MCNC: 119 MG/DL (ref 70–99)
HBA1C MFR BLD: 7.4 %
HCT VFR BLD AUTO: 30.5 % (ref 35–47)
HGB BLD-MCNC: 9.5 G/DL (ref 11.7–15.7)
MCH RBC QN AUTO: 25.7 PG (ref 26.5–33)
MCHC RBC AUTO-ENTMCNC: 31.1 G/DL (ref 31.5–36.5)
MCV RBC AUTO: 82 FL (ref 78–100)
PLATELET # BLD AUTO: 526 10E3/UL (ref 150–450)
POTASSIUM SERPL-SCNC: 4.2 MMOL/L (ref 3.4–5.3)
PROT SERPL-MCNC: 7 G/DL (ref 6.4–8.3)
RBC # BLD AUTO: 3.7 10E6/UL (ref 3.8–5.2)
SODIUM SERPL-SCNC: 136 MMOL/L (ref 135–145)
STRESS ECHO BASELINE DIASTOLIC HE: 69
STRESS ECHO BASELINE HR: NORMAL
STRESS ECHO BASELINE SYSTOLIC BP: 148
STRESS ECHO LAST STRESS DIASTOLIC BP: 55
STRESS ECHO LAST STRESS HR: 137
STRESS ECHO LAST STRESS SYSTOLIC BP: 141
STRESS ECHO POST ESTIMATED WORKLOAD: 1
STRESS ECHO POST EXERCISE DUR MIN: 6
STRESS ECHO POST EXERCISE DUR SEC: 47
STRESS ECHO TARGET HR: 125
WBC # BLD AUTO: 11.5 10E3/UL (ref 4–11)

## 2024-07-09 PROCEDURE — 93018 CV STRESS TEST I&R ONLY: CPT | Performed by: STUDENT IN AN ORGANIZED HEALTH CARE EDUCATION/TRAINING PROGRAM

## 2024-07-09 PROCEDURE — 250N000011 HC RX IP 250 OP 636: Performed by: STUDENT IN AN ORGANIZED HEALTH CARE EDUCATION/TRAINING PROGRAM

## 2024-07-09 PROCEDURE — 255N000002 HC RX 255 OP 636: Performed by: STUDENT IN AN ORGANIZED HEALTH CARE EDUCATION/TRAINING PROGRAM

## 2024-07-09 PROCEDURE — 36415 COLL VENOUS BLD VENIPUNCTURE: CPT

## 2024-07-09 PROCEDURE — 258N000003 HC RX IP 258 OP 636: Performed by: STUDENT IN AN ORGANIZED HEALTH CARE EDUCATION/TRAINING PROGRAM

## 2024-07-09 PROCEDURE — 82040 ASSAY OF SERUM ALBUMIN: CPT

## 2024-07-09 PROCEDURE — 93325 DOPPLER ECHO COLOR FLOW MAPG: CPT | Mod: 26 | Performed by: STUDENT IN AN ORGANIZED HEALTH CARE EDUCATION/TRAINING PROGRAM

## 2024-07-09 PROCEDURE — 85027 COMPLETE CBC AUTOMATED: CPT

## 2024-07-09 PROCEDURE — 93321 DOPPLER ECHO F-UP/LMTD STD: CPT | Mod: 26 | Performed by: STUDENT IN AN ORGANIZED HEALTH CARE EDUCATION/TRAINING PROGRAM

## 2024-07-09 PROCEDURE — 250N000009 HC RX 250: Performed by: STUDENT IN AN ORGANIZED HEALTH CARE EDUCATION/TRAINING PROGRAM

## 2024-07-09 PROCEDURE — 83036 HEMOGLOBIN GLYCOSYLATED A1C: CPT

## 2024-07-09 PROCEDURE — 99204 OFFICE O/P NEW MOD 45 MIN: CPT | Mod: 95 | Performed by: PHYSICIAN ASSISTANT

## 2024-07-09 PROCEDURE — 86900 BLOOD TYPING SEROLOGIC ABO: CPT

## 2024-07-09 PROCEDURE — 93016 CV STRESS TEST SUPVJ ONLY: CPT | Performed by: STUDENT IN AN ORGANIZED HEALTH CARE EDUCATION/TRAINING PROGRAM

## 2024-07-09 PROCEDURE — 93350 STRESS TTE ONLY: CPT | Mod: 26 | Performed by: STUDENT IN AN ORGANIZED HEALTH CARE EDUCATION/TRAINING PROGRAM

## 2024-07-09 RX ORDER — OXYCODONE AND ACETAMINOPHEN 5; 325 MG/1; MG/1
1 TABLET ORAL EVERY 6 HOURS PRN
Status: ON HOLD | COMMUNITY
Start: 2024-04-24 | End: 2024-07-11

## 2024-07-09 RX ORDER — ATROPINE SULFATE 0.4 MG/ML
.2-.4 AMPUL (ML) INJECTION
Status: DISCONTINUED | OUTPATIENT
Start: 2024-07-09 | End: 2024-07-10 | Stop reason: HOSPADM

## 2024-07-09 RX ORDER — DOBUTAMINE HYDROCHLORIDE 200 MG/100ML
10 INJECTION INTRAVENOUS CONTINUOUS
Status: ACTIVE | OUTPATIENT
Start: 2024-07-09 | End: 2024-07-09

## 2024-07-09 RX ORDER — METOPROLOL TARTRATE 1 MG/ML
1-10 INJECTION, SOLUTION INTRAVENOUS
Status: ACTIVE | OUTPATIENT
Start: 2024-07-09 | End: 2024-07-09

## 2024-07-09 RX ORDER — MAGNESIUM GLUCONATE 27 MG(500)
250 TABLET ORAL EVERY MORNING
Status: ON HOLD | COMMUNITY
End: 2024-07-18

## 2024-07-09 RX ORDER — LIDOCAINE 40 MG/G
CREAM TOPICAL
Status: DISCONTINUED | OUTPATIENT
Start: 2024-07-09 | End: 2024-07-10 | Stop reason: HOSPADM

## 2024-07-09 RX ORDER — VIT C/E/CUPERIC/ZINC/LUTEIN 226-90-0.8
1 CAPSULE ORAL EVERY MORNING
Status: ON HOLD | COMMUNITY
Start: 2024-01-02 | End: 2024-07-18

## 2024-07-09 RX ORDER — AMOXICILLIN 250 MG
1 CAPSULE ORAL
COMMUNITY

## 2024-07-09 RX ADMIN — PERFLUTREN 6 ML: 6.52 INJECTION, SUSPENSION INTRAVENOUS at 15:35

## 2024-07-09 RX ADMIN — DEXTROSE MONOHYDRATE 10 MCG/KG/MIN: 50 INJECTION, SOLUTION INTRAVENOUS at 15:24

## 2024-07-09 RX ADMIN — METOPROLOL TARTRATE 3 MG: 1 INJECTION, SOLUTION INTRAVENOUS at 15:34

## 2024-07-09 ASSESSMENT — ENCOUNTER SYMPTOMS: SEIZURES: 0

## 2024-07-09 ASSESSMENT — LIFESTYLE VARIABLES: TOBACCO_USE: 1

## 2024-07-09 NOTE — PATIENT INSTRUCTIONS
Preparing for Your Surgery      Name:  Rocío Russell   MRN:  [id number]   :  1950   Today's Date:  2024       Arriving for surgery:  Surgery date:  07/10/2024  Arrival time:  5:30 am    Please come to:         [facility]    [address] [place]  [address]     The [facility] [place] Patient/Visitor Ramp is at [address]. Patients and visitors who self-park will receive the reduced hospital parking rate. If the Patient /Visitor Ramp is full, please follow the signs to the [employer] car park located at the main hospital entrance.       parking is available (24 hours/ 7 days a week)      Discounted parking pass options are available for patients and visitors. They can be purchased at the [employer] desk at the [facility] hospital entrance.     -    Stop at the security desk and they will direct surgery patients to the Surgery Check in and Family Lounge. [phone]        - If you need directions, a wheelchair or an escort please stop at the Information/security desk in the lobby.     What can I eat or drink?  -  You may eat and drink normally up to 8 hours prior to arrival time. (Until 7/3/24 at 10 pm)  -  You may have clear liquids until 2 hours prior to arrival time. (Until 3:30 am)    Examples of clear liquids:  Water  Clear broth  Juices (apple, white grape, white cranberry  and cider) without pulp  Noncarbonated, powder based beverages  (lemonade and Leonidas-Aid)  Sodas (Sprite, 7-Up, ginger ale and seltzer)  Coffee or tea (without milk or cream)  Gatorade    -  No Alcohol or cannabis products for at least 24 hours before surgery.     Which medicines can I take?    Hold Aspirin for 7 days before surgery.   Hold Multivitamins for 7 days before surgery.  Hold Supplements for 7 days before surgery. (Glucosamine)  Hold Ibuprofen (Advil, Motrin) for  day(s) before surgery--unless  otherwise directed by surgeon.  Hold Naproxen (Aleve) for 4 days before surgery.    -  DO NOT take these medications the day of surgery:  Calcium  Collagen  Iron supplement  Fenofibrate   Furosemide  Glimepiride  Lisinopril  Loratadine  Magnesium  Metformin  Senna   Simethicone (mylicon)   Vitamin D      -  PLEASE TAKE these medications the day of surgery:  Duloxetine  Gabapentin  Oxycodone if needed       How do I prepare myself?  - Please take 2 showers (one the night prior to surgery and one the morning of surgery) using Scrubcare or Hibiclens soap.    Use this soap only from the neck to your toes.     Leave the soap on your skin for one minute--then rinse thoroughly.      You may use your own shampoo and conditioner. No other hair products.   - Please remove all jewelry and body piercings.  - No lotions, deodorants or fragrance.  - No makeup or fingernail polish.   - Bring your ID and insurance card.    -If you use a CPAP machine, please bring the CPAP machine, tubing, and mask to hospital.    -If you have a Deep Brain Stimulator, Spinal Cord Stimulator, or any Neuro Stimulator device---you must bring the remote control to the hospital.      ALL PATIENTS GOING HOME THE SAME DAY OF SURGERY ARE REQUIRED TO HAVE A RESPONSIBLE ADULT TO DRIVE AND BE IN ATTENDANCE WITH THEM FOR 24 HOURS FOLLOWING SURGERY.    Covid testing policy as of 12/06/2022  Your surgeon will notify and schedule you for a COVID test if one is needed before surgery--please direct any questions or COVID symptoms to your surgeon      Questions or Concerns:    - For any questions regarding the day of surgery or your hospital stay, please contact the Pre Admission Nursing Office at [phone].       - If you have health changes between today and your surgery, please call your surgeon.       - For questions after surgery, please call your surgeons office.           Current Visitor Guidelines    You may have 2 visitors in the pre op area.    Visiting  hours: 8 a.m. to 8:30 p.m.    Patients confirmed or suspected to have symptoms of COVID 19 or flu:     No visitors allowed for adult patients.   Children (under age 18) can have 1 named visitor.     People who are sick or showing symptoms of COVID 19 or flu:    Are not allowed to visit patients--we can only make exceptions in special situations.       Please follow these guidelines for your visit:          Please maintain social distance          Masking is optional--however at times you may be asked to wear a mask for the safety of yourself and others     Clean your hands with alcohol hand . Do this when you arrive at and leave the building and patient room,    And again after you touch your mask or anything in the room.     Go directly to and from the room you are visiting.     Stay in the patient s room during your visit. Limit going to other places in the hospital as much as possible     Leave bags and jackets at home or in the car.     For everyone s health, please don t come and go during your visit. That includes for smoking   during your visit.

## 2024-07-09 NOTE — PROGRESS NOTES
Pt here for dobutamine stress test. Test, meds and side effects reviewed with patient. Achieved target HR at 30 mcg Dobutamine. Gave a total of 3 mg IV Metoprolol to bring HR back to baseline. Post monitoring complete and VSS. Pt escorted out to the gold waiting room.

## 2024-07-09 NOTE — PROGRESS NOTES
Kimberlyn is a 73 year old who is being evaluated via a billable video visit.    Negrito Lenz   Kimberlyn is a 73 year old, presenting for the following health issues:  Pre-Op Exam          AMARJIT Bingham LPN

## 2024-07-09 NOTE — H&P
Pre-Operative H & P     CC:  Preoperative exam to assess for increased cardiopulmonary risk while undergoing surgery and anesthesia.    Date of Encounter: 7/9/2024  Primary Care Physician:  Niki Hoyos     Reason for visit:   Encounter Diagnoses   Name Primary?    Pre-operative examination Yes    Injury of bile duct, subsequent encounter     Type 2 diabetes mellitus without complication, without long-term current use of insulin (H)        HPI  Rocío Russell is a 73 year old female who presents for pre-operative H & P in preparation for  Procedure Information       Case: [id number] Date/Time: 07/10/24 0730    Procedures:       HEPATICOJEJUNOSTOMY (Abdomen)      REVISION, ANASTOMOSIS, ONEAL-EN-Y (Abdomen)    Anesthesia type: General with Block    Diagnosis: Injury of bile duct [S36.13XA]    Pre-op diagnosis: Injury of bile duct [S36.13XA]    Location:  OR  /  OR    Providers: Alejandro Becker MD          The patient is a 73-year-old woman with a complex past medical history significant for hypertension, hyperlipidemia, tobacco use, history of DVT, anemia, leukocytosis, thrombocytosis, type 2 diabetes, history of transfusion, recent fourth and fifth metatarsal fracture who underwent a cholecystectomy in December 2023 which was complicated by biliary duct injury needing biliary tube with a benign biliary stricture.  Given the patient's ongoing issues she met with Dr. Becker on 6/10/2024 and has been counseled for the procedure as above.    History is obtained from the patient, patient's daughter and chart review    Hx of abnormal bleeding or anti-platelet use: None     Menstrual history: No LMP recorded.:      Past Medical History  Past Medical History:   Diagnosis Date    Closed fracture of one or more phalanges of foot     Diabetes mellitus (H)     Foot drop     Gastroesophageal reflux disease with esophagitis     HLD (hyperlipidemia)     HTN (hypertension)     Injury of bile duct      Osteopenia     Personal history of DVT (deep vein thrombosis)     Sciatica, unspecified laterality     Tobacco use        Past Surgical History  Past Surgical History:   Procedure Laterality Date    BREAST LUMPECTOMY W/ NEEDLE LOCALIZATION      CARPAL TUNNEL RELEASE RT/LT Bilateral     CHOLECYSTECTOMY      EXPLORATORY LAPAROTOMY, EVACUATION OF TWO LITERS OF FLUID CONTAINING BLOOD AND BILE, PERITONEAL LAVAGE, DRAINAGE OF RIGHT KIDNEY CYST, PLACEMENT OF DRAIN, INSERTION OF GASTROSTOMY FEEDING TUB      IR ERCP  12/18/2023    LUMBAR SPINE SURGERY      LAMINECTOMY DISCECTOMY    RIGHT OOPHORECTOMY         Prior to Admission Medications  Current Outpatient Medications   Medication Sig Dispense Refill    acetaminophen (TYLENOL) 325 MG tablet Take 650 mg by mouth every 4 hours as needed      amitriptyline (ELAVIL) 10 MG tablet Take 10 mg by mouth at bedtime      CALCIUM PO Take 600 mg by mouth every morning      COLLAGEN PO Take by mouth 2 times daily      DULOXETINE HCL PO Take 60 mg by mouth every morning      fenofibrate (TRIGLIDE/LOFIBRA) 160 MG tablet Take 160 mg by mouth every morning      ferrous sulfate (FEROSUL) 325 (65 Fe) MG tablet Take 325 mg by mouth daily (with breakfast)      furosemide (LASIX) 20 MG tablet Take 20 mg by mouth daily as needed Hasn't needed recently      gabapentin (NEURONTIN) 600 MG tablet Take 600 mg by mouth 2 times daily      glimepiride (AMARYL) 1 MG tablet Take 1 mg by mouth every morning (before breakfast)      Glucosamine Sulfate 500 MG TABS Take 1,000 mg by mouth every morning      lisinopril (ZESTRIL) 40 MG tablet Take 40 mg by mouth every morning      loratadine (CLARITIN) 10 MG tablet Take 10 mg by mouth every morning      metFORMIN osmotic (FORTAMET) 1000 MG 24 hr tablet Take 500 mg by mouth 2 times daily      Multiple Vitamins-Minerals (PRESERVISION/LUTEIN) CAPS Take 1 capsule by mouth every morning      oxyCODONE-acetaminophen (PERCOCET) 5-325 MG tablet Take 1 tablet by mouth  every 6 hours as needed      simethicone (MYLICON) 80 MG chewable tablet Take 80 mg by mouth every 6 hours as needed      simvastatin (ZOCOR) 40 MG tablet Take 40 mg by mouth at bedtime      Vitamin D, Cholecalciferol, 10 MCG (400 UNIT) TABS Take 400 Units by mouth every other day      ACCU-CHEK GUIDE test strip TEST BLOOD SUGAR THREE TIMES DAILY      aspirin 81 MG EC tablet Take 81 mg by mouth (Patient not taking: Reported on 7/9/2024)      blood glucose monitoring (SOFTCLIX) lancets       Continuous Blood Gluc Sensor (FREESTYLE KATHERINE 2 SENSOR) [facility] APPLY ONE NEW SENSOR TO THE BACK OF THE UPPER ARM ONCE EVERY 14 DAYS AS DIRECTED, TO MONITOR BLOOD SUGAR CONTINUOUSLY.      magnesium gluconate (MAGONATE) 500 MG tablet Take 250 mg by mouth every morning      senna-docusate (SENOKOT-S/PERICOLACE) 8.6-50 MG tablet Take 1 tablet by mouth daily         Allergies  Allergies   Allergen Reactions    Animal Dander Other (See Comments)     Runny nose/watery eyes    Nickel Rash    Perfume Headache    Codeine Other (See Comments) and Unknown     Other reaction(s): Nightmares   Other reaction(s): Nightmares    Other reaction(s): Nightmares    Sulfa Antibiotics      Eye drops       Social History  Social History     Socioeconomic History    Marital status: Single     Spouse name: Not on file    Number of children: Not on file    Years of education: Not on file    Highest education level: Not on file   Occupational History    Not on file   Tobacco Use    Smoking status: Every Day     Current packs/day: 0.20     Types: Cigarettes     Passive exposure: Current    Smokeless tobacco: Never    Tobacco comments:     5-8per day   Substance and Sexual Activity    Alcohol use: Not Currently    Drug use: Not Currently    Sexual activity: Not on file   Other Topics Concern    Not on file   Social History Narrative    Not on file     Social Determinants of Health     Financial Resource Strain: Low Risk  (5/6/2024)    Received from [facility]    Overall Financial Resource Strain (CARDIA)     Difficulty of Paying Living Expenses: Not hard at all   Food Insecurity: No Food Insecurity (5/6/2024)    Received from [facility]    Hunger Vital Sign     Worried About Running Out of Food in the Last Year: Never true     Ran Out of Food in the Last Year: Never true   Transportation Needs: No Transportation Needs (5/6/2024)    Received from [facility]    PRAPARE - Transportation     Lack of Transportation (Medical): No     Lack of Transportation (Non-Medical): No   Physical Activity: Inactive (11/30/2022)    [facility]    Exercise Vital Sign     Days of Exercise per Week: 0 days     Minutes of Exercise per Session: 0 min   Stress: No Stress Concern Present (11/30/2022)    [facility]    [employer] [place] [employer] - Occupational Stress Questionnaire     Feeling of Stress : Not at all   Social Connections: Moderately Isolated (11/30/2022)    [facility]    Social Connection and Isolation Panel [NHANES]     Frequency of Communication with Friends and Family: Twice a week     Frequency of Social Gatherings with Friends and Family: More than three times a week     Attends Shinto Services: 1 to 4 times per year     Active Member of Clubs or Organizations: No     Attends Club or Organization Meetings: Never     Marital Status:    Interpersonal Safety: Not At Risk (1/26/2024)    Received from [facility]     IP Custom IPV     Do you feel UNSAFE in any of your personal relationships with your family members or any other acquaintances?: No   Housing Stability: Low Risk  (5/6/2024)    Received from [facility]    Housing Stability Vital Sign     Unable  to Pay for Housing in the Last Year: No     Number of Places Lived in the Last Year: 1     Unstable Housing in the Last Year: No       Family History  Family History   Problem Relation Age of Onset    Anesthesia Reaction No family hx of     Deep Vein Thrombosis (DVT) No family hx of        Review of Systems  The complete review of systems is negative other than noted in the HPI or here.   Anesthesia Evaluation   Pt has had prior anesthetic. Type: General and MAC.    History of anesthetic complications  - .  the patient had delirium with hallucinations after her multiple surgeries with sepsis.    ROS/MED HX  ENT/Pulmonary:     (+)                tobacco use, Current use,                       Neurologic: Comment: Bilateral foot drop   (-) no seizures, no CVA and no TIA   Cardiovascular:     (+) Dyslipidemia hypertension- -   -  - -                                 Previous cardiac testing   Echo: Date: 3/19/24 Results:    Stress Test:  Date: Results:    ECG Reviewed:  Date: 4/2/24 Results:  Sinus tachycardia   Otherwise normal ECG     Cath:  Date: Results:      METS/Exercise Tolerance: 1 - Eating, dressing Comment: Patient is limited in activity due to recent foot fracture. At baseline she uses a walker due to bilateral foot drop and sciatica    Hematologic: Comments: Anemia, leukocytosis, thrombocytosis    (+) History of blood clots,    pt is anticoagulated, anemia, history of blood transfusion, no previous transfusion reaction,        Musculoskeletal: Comment: Prior lumbar laminectomy and discectomy. Continue back pain with sciatica  (+)     fracture, Fractures: 4th and 5th metatarsal fracture 7/1/24.         GI/Hepatic: Comment: Bile duct injury/ biliary stricture - s/p drain and ERCP. J PEG in place    (+)          cholecystitis/cholelithiasis (s/p cholecystectomy),          Renal/Genitourinary:       Endo: Comment: osteopenia    (+)  type II DM,                    Psychiatric/Substance Use:  - neg psychiatric ROS    (+)    H/O chronic opiod use .     Infectious Disease:  - neg infectious disease ROS     Malignancy:  - neg malignancy ROS     Other:  - neg other ROS    (+)  , H/O Chronic Pain,         Virtual visit -  No vitals were obtained    Physical Exam  Constitutional: Awake, alert, cooperative, no apparent distress, and appears stated age.  Eyes: Pupils equal  HENT: Normocephalic  Respiratory: non labored breathing   Neurologic: Awake, alert, oriented to name, place and time.   Neuropsychiatric: Calm, cooperative. Normal affect.      Prior Labs/Diagnostic Studies   All labs and imaging personally reviewed     EKG/ stress test - if available please see in ROS above     EKG 3/19/24  Sinus tachycardia   Otherwise normal ECG       Echo 4/2/24  Narrative      Left Ventricle: Normal left ventricular systolic function. The EF is  visually estimated to be 60%.      The patient's records and results personally reviewed by this provider.     Outside records reviewed from: Care Everywhere      LABS   Latest Reference Range & Units 07/09/24 14:38   Sodium 135 - 145 mmol/L 136   Potassium 3.4 - 5.3 mmol/L 4.2   Chloride 98 - 107 mmol/L 101   Carbon Dioxide (CO2) 22 - 29 mmol/L 23   Urea Nitrogen 8.0 - 23.0 mg/dL 31.4 (H)   Creatinine 0.51 - 0.95 mg/dL 0.65   GFR Estimate >60 mL/min/1.73m2 >90   Calcium 8.8 - 10.2 mg/dL 9.8   Anion Gap 7 - 15 mmol/L 12   Albumin 3.5 - 5.2 g/dL 3.6   Protein Total 6.4 - 8.3 g/dL 7.0   Alkaline Phosphatase 40 - 150 U/L 278 (H)   ALT 0 - 50 U/L 50   AST 0 - 45 U/L 34   Bilirubin Total <=1.2 mg/dL 0.2   Glucose 70 - 99 mg/dL 119 (H)   Hemoglobin A1C <5.7 % 7.4 (H)   WBC 4.0 - 11.0 10e3/uL 11.5 (H)   Hemoglobin 11.7 - 15.7 g/dL 9.5 (L)   Hematocrit 35.0 - 47.0 % 30.5 (L)   Platelet Count 150 - 450 10e3/uL 526 (H)   RBC Count 3.80 - 5.20 10e6/uL 3.70 (L)   MCV 78 - 100 fL 82   MCH 26.5 - 33.0 pg 25.7 (L)   MCHC 31.5 - 36.5 g/dL 31.1 (L)   RDW 10.0 - 15.0 % 17.5 (H)     The patient's anemia, leukocytosis and  thrombocytosis are stable for the patient. Her WBC is only 0.5 above normal and her thrombocytosis has been present since her cholecystectomy and bile duct injury. Her surgery is tomorrow so do not have the opportunity to do iron infusions before surgery so iron studies were not checked. A1c shows stable diabetes and noted Alkaline Phosphatase elevated. No further work up needed.       Assessment    Rocío Russell is a 73 year old female seen as a PAC referral for risk assessment and optimization for anesthesia.    Plan/Recommendations  Pt will be optimized for the proposed procedure.  See below for details on the assessment, risk, and preoperative recommendations    NEUROLOGY  - No history of TIA, CVA or seizure  - Bilateral foot drop - the patient uses a walker and notes more symptoms when she's tired. Fall precautions as indicated.   - Chronic Pain  On chronic opiates, morphine equivilant = None   -Post Op delirium risk factors:  Age, High co-morbid index, and History of prior delirium    ENT  - No current airway concerns.  Will need to be reassessed day of surgery.  Mallampati: Unable to assess  TM: Unable to assess  ~ The patient's daughter dose note that in the past during her mother's previous hospitalization the patient had a very difficult NG tube placement.     CARDIAC  - Hypertension  Well controlled  - Hyperlipidemia  Well controlled on home regimen  - The patient was seen by her cardiologist prior to surgery for optimization:  Cardiology 3/19/24  Assessment / plan:    1. CAD: She has no prior history of CAD. Denies cardiac symptoms. Her working capacity before cholecystectomy was at least 4 METS. Plan to check an echo to assess her heart function & clear her for surgery. Advised to continue with healthy lifestyle, 1ry prevention and risk factor modification to prevent future risk of cardiovascular events.    2. Hypertension: Blood pressure is at goal < 130/80, continue same treatment.    3.  "Hyperlipidemia: continue same treatment and do diet and exercise in addition. Goal LDL< 70, HDL>40, TGL<150.     4. Follow-up with PCP and see me as needed.       - METS (Metabolic Equivalents)  Patient CANNOT perform 4 METS exercise          Total Score: 1    Functional Capacity: Unable to complete 4 METS      RCRI-Low risk: Class 2 0.9% complication rate             Total Score: 1    RCRI: High Risk Surgery    ~ The patient is scheduled for a stress test later today ordered by Dr. Becker.     PULMONARY  - Obstructive Sleep Apnea  No current risk of obstructive sleep apnea   ANNIE Low Risk             Total Score: 2    ANNIE: Hypertension    ANNIE: Over 50 ys old      - Allergies - continue claritin  - Tobacco History    History   Smoking Status    Every Day    Types: Cigarettes   Smokeless Tobacco    Never       GI  - GERD  Controlled on medications: The patient was taking prilosec but this was stopped due to issues with magnesium replacement. She now takes TUMs as needed instead.   - S/p cholecystectomy c/b bile duct injury s/p drain and biliary stricture s/p ERCP. The patient has J PEG in place- procedure as above.   PONV Medium Risk  Total Score: 2           1 AN PONV: Pt is Female    1 AN PONV: Intended Post Op Opioids        /RENAL  - Baseline Creatinine  0.61    ENDOCRINE    - BMI: Estimated body mass index is 23.36 kg/m  as calculated from the following:    Height as of this encounter: 1.613 m (5' 3.5\").    Weight as of this encounter: 60.8 kg (134 lb).  Healthy Weight (BMI 18.5-24.9)  - Diabetes  No results found for: \"A1C\"  Diabetes Mellitus, Type 2, non-insulin dependent.  Hold morning oral hypoglycemic medications. Recommend close monitoring of the patient's blood glucose levels throughout the perioperative period.  - osteopenia    HEME  VTE Low Risk 0.5%             Total Score: 4    VTE: Greater than 59 yrs old    VTE: Pt history of VTE      - Platelet disfunction second to Aspirin (Selam, many " others) - holding since 7/9/24  ~ Anemia, leukocytosis and thrombocytosis on labs from 5/9/24. Will recheck labs and type and screen for surgery tomorrow.   ~ History of DVT 12/27/23. The patient was on Eliquis but stopped after 5-6 month course.     MSK  ~ The patient had closed 4th and 5th metatarsal fracture on 7/1/24. She is wearing a CAM boot and able to walk.   ~ S/p lumbar laminectomy and discectomy. The patient continues to have back pain with sciatica. She uses a walker.     ANESTHESIA  ~ The patient's daughter dose note that during her hospitalization for sepsis from the bile duct injury that after her multiple procedures that her mother had delirium with hallucinations for about 2 days afterwards. No other issues with anesthesia prior.     Different anesthesia methods/types have been discussed with the patient, but they are aware that the final plan will be decided by the assigned anesthesia provider on the date of service.      The patient is optimized for their procedure. AVS with information on surgery time/arrival time, meds and NPO status given by nursing staff. No further diagnostic testing indicated.    Please refer to the physical examination documented by the anesthesiologist in the anesthesia record on the day of surgery.    Video-Visit Details    Type of service:  Video Visit    Provider received verbal consent for a Video Visit from the patient? Yes   Video Start Time:  8:16  Video End Time: 8:35    Originating Location (pt. Location): Other [facility]    Distant Location (provider location):  Off-site  Mode of Communication:  Video Conference via [facility]  On the day of service:     Prep time: 11 minutes  Visit time: 20 minutes  Documentation time: 14 minutes  ------------------------------------------  Total time: 45 minutes      Tomasa Enriquez PA-C  Preoperative Assessment Center  [facility]  Phone: [phone]  Fax: [phone]

## 2024-07-10 ENCOUNTER — HOSPITAL ENCOUNTER (INPATIENT)
Facility: CLINIC | Age: 74
LOS: 8 days | Discharge: HOME | DRG: 329 | End: 2024-07-18
Attending: TRANSPLANT SURGERY | Admitting: TRANSPLANT SURGERY
Payer: MEDICARE

## 2024-07-10 ENCOUNTER — ANESTHESIA (OUTPATIENT)
Dept: SURGERY | Facility: CLINIC | Age: 74
DRG: 329 | End: 2024-07-10
Payer: MEDICARE

## 2024-07-10 ENCOUNTER — APPOINTMENT (OUTPATIENT)
Dept: ULTRASOUND IMAGING | Facility: CLINIC | Age: 74
DRG: 329 | End: 2024-07-10
Attending: TRANSPLANT SURGERY
Payer: MEDICARE

## 2024-07-10 ENCOUNTER — APPOINTMENT (OUTPATIENT)
Dept: GENERAL RADIOLOGY | Facility: CLINIC | Age: 74
DRG: 329 | End: 2024-07-10
Attending: TRANSPLANT SURGERY
Payer: MEDICARE

## 2024-07-10 DIAGNOSIS — S36.13XA BILE DUCT INJURY: ICD-10-CM

## 2024-07-10 DIAGNOSIS — Z78.9 TAKES DAILY MULTIVITAMINS: ICD-10-CM

## 2024-07-10 DIAGNOSIS — K83.1 BILIARY STRICTURE (H): Primary | ICD-10-CM

## 2024-07-10 DIAGNOSIS — E61.1 IRON DEFICIENCY: ICD-10-CM

## 2024-07-10 DIAGNOSIS — B99.9 INTRA-ABDOMINAL INFECTION: ICD-10-CM

## 2024-07-10 DIAGNOSIS — E83.42 HYPOMAGNESEMIA: ICD-10-CM

## 2024-07-10 LAB
ABO/RH(D): NORMAL
ALBUMIN SERPL BCG-MCNC: 3.2 G/DL (ref 3.5–5.2)
ALLEN'S TEST: YES
ALP SERPL-CCNC: 346 U/L (ref 40–150)
ALT SERPL W P-5'-P-CCNC: 1836 U/L (ref 0–50)
ANION GAP SERPL CALCULATED.3IONS-SCNC: 13 MMOL/L (ref 7–15)
ANTIBODY SCREEN: NEGATIVE
APTT PPP: 29 SECONDS (ref 22–38)
AST SERPL W P-5'-P-CCNC: 3824 U/L (ref 0–45)
BACTERIA SPEC CULT: ABNORMAL
BASE EXCESS BLDA CALC-SCNC: -3 MMOL/L (ref -3–3)
BASE EXCESS BLDA CALC-SCNC: -3.5 MMOL/L (ref -3–3)
BASE EXCESS BLDA CALC-SCNC: -3.7 MMOL/L (ref -3–3)
BASE EXCESS BLDA CALC-SCNC: -4.3 MMOL/L (ref -3–3)
BASE EXCESS BLDA CALC-SCNC: -5.2 MMOL/L (ref -3–3)
BASE EXCESS BLDA CALC-SCNC: -5.2 MMOL/L (ref -3–3)
BASE EXCESS BLDA CALC-SCNC: -5.8 MMOL/L (ref -3–3)
BILIRUB SERPL-MCNC: 1.3 MG/DL
BLD PROD TYP BPU: NORMAL
BLOOD COMPONENT TYPE: NORMAL
BUN SERPL-MCNC: 25.8 MG/DL (ref 8–23)
CA-I BLD-MCNC: 4.6 MG/DL (ref 4.4–5.2)
CA-I BLD-MCNC: 4.7 MG/DL (ref 4.4–5.2)
CA-I BLD-MCNC: 4.8 MG/DL (ref 4.4–5.2)
CA-I BLD-MCNC: 4.9 MG/DL (ref 4.4–5.2)
CA-I BLD-MCNC: 4.9 MG/DL (ref 4.4–5.2)
CA-I BLD-MCNC: 5 MG/DL (ref 4.4–5.2)
CALCIUM SERPL-MCNC: 8.4 MG/DL (ref 8.8–10.2)
CHLORIDE SERPL-SCNC: 103 MMOL/L (ref 98–107)
CODING SYSTEM: NORMAL
COHGB MFR BLD: 90.2 % (ref 95–96)
CREAT SERPL-MCNC: 0.57 MG/DL (ref 0.51–0.95)
CROSSMATCH: NORMAL
DEPRECATED HCO3 PLAS-SCNC: 19 MMOL/L (ref 22–29)
EGFRCR SERPLBLD CKD-EPI 2021: >90 ML/MIN/1.73M2
ERYTHROCYTE [DISTWIDTH] IN BLOOD BY AUTOMATED COUNT: 17.1 % (ref 10–15)
FIBRINOGEN PPP-MCNC: 461 MG/DL (ref 170–490)
GLUCOSE BLD-MCNC: 117 MG/DL (ref 70–99)
GLUCOSE BLD-MCNC: 128 MG/DL (ref 70–99)
GLUCOSE BLD-MCNC: 131 MG/DL (ref 70–99)
GLUCOSE BLD-MCNC: 157 MG/DL (ref 70–99)
GLUCOSE BLD-MCNC: 197 MG/DL (ref 70–99)
GLUCOSE BLD-MCNC: 199 MG/DL (ref 70–99)
GLUCOSE BLDC GLUCOMTR-MCNC: 134 MG/DL (ref 70–99)
GLUCOSE BLDC GLUCOMTR-MCNC: 145 MG/DL (ref 70–99)
GLUCOSE BLDC GLUCOMTR-MCNC: 162 MG/DL (ref 70–99)
GLUCOSE BLDC GLUCOMTR-MCNC: 182 MG/DL (ref 70–99)
GLUCOSE BLDC GLUCOMTR-MCNC: 196 MG/DL (ref 70–99)
GLUCOSE SERPL-MCNC: 186 MG/DL (ref 70–99)
GRAM STAIN RESULT: ABNORMAL
HCO3 BLD-SCNC: 19 MMOL/L (ref 21–28)
HCO3 BLDA-SCNC: 20 MMOL/L (ref 21–28)
HCO3 BLDA-SCNC: 20 MMOL/L (ref 21–28)
HCO3 BLDA-SCNC: 21 MMOL/L (ref 21–28)
HCO3 BLDA-SCNC: 21 MMOL/L (ref 21–28)
HCO3 BLDA-SCNC: 22 MMOL/L (ref 21–28)
HCO3 BLDA-SCNC: 22 MMOL/L (ref 21–28)
HCT VFR BLD AUTO: 33.4 % (ref 35–47)
HGB BLD-MCNC: 10.3 G/DL (ref 11.7–15.7)
HGB BLD-MCNC: 10.7 G/DL (ref 11.7–15.7)
HGB BLD-MCNC: 8.4 G/DL (ref 11.7–15.7)
HGB BLD-MCNC: 8.5 G/DL (ref 11.7–15.7)
HGB BLD-MCNC: 9.2 G/DL (ref 11.7–15.7)
HGB BLD-MCNC: 9.4 G/DL (ref 11.7–15.7)
HGB BLD-MCNC: 9.7 G/DL (ref 11.7–15.7)
HIV 1+2 AB+HIV1P24 AG SERPLBLD IA.RAPID: NON REACTIVE
HIV 1+2 AB+HIV1P24 AG SERPLBLD IA.RAPID: NON REACTIVE
HIV INTERPRETATION: NORMAL
INR PPP: 1.21 (ref 0.85–1.15)
ISSUE DATE AND TIME: NORMAL
ISSUE DATE AND TIME: NORMAL
LACTATE BLD-SCNC: 0.4 MMOL/L (ref 0.7–2)
LACTATE BLD-SCNC: 0.8 MMOL/L (ref 0.7–2)
LACTATE BLD-SCNC: 1 MMOL/L (ref 0.7–2)
LACTATE BLD-SCNC: 1.1 MMOL/L (ref 0.7–2)
LACTATE BLD-SCNC: 1.8 MMOL/L (ref 0.7–2)
LACTATE BLD-SCNC: 2.3 MMOL/L (ref 0.7–2)
LACTATE SERPL-SCNC: 1.3 MMOL/L (ref 0.7–2)
MCH RBC QN AUTO: 26.1 PG (ref 26.5–33)
MCHC RBC AUTO-ENTMCNC: 32 G/DL (ref 31.5–36.5)
MCV RBC AUTO: 82 FL (ref 78–100)
O2/TOTAL GAS SETTING VFR VENT: 21 %
O2/TOTAL GAS SETTING VFR VENT: 40 %
O2/TOTAL GAS SETTING VFR VENT: 5 %
O2/TOTAL GAS SETTING VFR VENT: 50 %
OXYHGB MFR BLDA: 96 % (ref 92–100)
OXYHGB MFR BLDA: 97 % (ref 92–100)
PCO2 BLD: 31 MM HG (ref 35–45)
PCO2 BLDA: 37 MM HG (ref 35–45)
PCO2 BLDA: 39 MM HG (ref 35–45)
PCO2 BLDA: 40 MM HG (ref 35–45)
PCO2 BLDA: 41 MM HG (ref 35–45)
PH BLD: 7.39 [PH] (ref 7.35–7.45)
PH BLDA: 7.3 [PH] (ref 7.35–7.45)
PH BLDA: 7.32 [PH] (ref 7.35–7.45)
PH BLDA: 7.34 [PH] (ref 7.35–7.45)
PH BLDA: 7.35 [PH] (ref 7.35–7.45)
PH BLDA: 7.36 [PH] (ref 7.35–7.45)
PH BLDA: 7.36 [PH] (ref 7.35–7.45)
PLATELET # BLD AUTO: 492 10E3/UL (ref 150–450)
PO2 BLD: 61 MM HG (ref 80–105)
PO2 BLDA: 127 MM HG (ref 80–105)
PO2 BLDA: 131 MM HG (ref 80–105)
PO2 BLDA: 134 MM HG (ref 80–105)
PO2 BLDA: 140 MM HG (ref 80–105)
PO2 BLDA: 142 MM HG (ref 80–105)
PO2 BLDA: 172 MM HG (ref 80–105)
POTASSIUM BLD-SCNC: 4.2 MMOL/L (ref 3.4–5.3)
POTASSIUM BLD-SCNC: 4.5 MMOL/L (ref 3.4–5.3)
POTASSIUM BLD-SCNC: 4.6 MMOL/L (ref 3.4–5.3)
POTASSIUM BLD-SCNC: 4.7 MMOL/L (ref 3.4–5.3)
POTASSIUM BLD-SCNC: 4.7 MMOL/L (ref 3.4–5.3)
POTASSIUM BLD-SCNC: 4.8 MMOL/L (ref 3.4–5.3)
POTASSIUM SERPL-SCNC: 5 MMOL/L (ref 3.4–5.3)
PROT SERPL-MCNC: 6.1 G/DL (ref 6.4–8.3)
RBC # BLD AUTO: 4.1 10E6/UL (ref 3.8–5.2)
SAO2 % BLDA: 89 % (ref 92–100)
SAO2 % BLDA: 97 % (ref 95–96)
SAO2 % BLDA: 98 % (ref 95–96)
SODIUM BLD-SCNC: 134 MMOL/L (ref 135–145)
SODIUM BLD-SCNC: 135 MMOL/L (ref 135–145)
SODIUM BLD-SCNC: 136 MMOL/L (ref 135–145)
SODIUM BLD-SCNC: 136 MMOL/L (ref 135–145)
SODIUM SERPL-SCNC: 135 MMOL/L (ref 135–145)
SPECIMEN EXPIRATION DATE: NORMAL
UNIT ABO/RH: NORMAL
UNIT NUMBER: NORMAL
UNIT STATUS: NORMAL
UNIT TYPE ISBT: 5100
WBC # BLD AUTO: 20.3 10E3/UL (ref 4–11)

## 2024-07-10 PROCEDURE — 250N000011 HC RX IP 250 OP 636: Performed by: ANESTHESIOLOGY

## 2024-07-10 PROCEDURE — 250N000009 HC RX 250: Performed by: STUDENT IN AN ORGANIZED HEALTH CARE EDUCATION/TRAINING PROGRAM

## 2024-07-10 PROCEDURE — 370N000017 HC ANESTHESIA TECHNICAL FEE, PER MIN: Performed by: TRANSPLANT SURGERY

## 2024-07-10 PROCEDURE — 250N000009 HC RX 250: Performed by: ANESTHESIOLOGY

## 2024-07-10 PROCEDURE — 0DU807Z SUPPLEMENT SMALL INTESTINE WITH AUTOLOGOUS TISSUE SUBSTITUTE, OPEN APPROACH: ICD-10-PCS | Performed by: TRANSPLANT SURGERY

## 2024-07-10 PROCEDURE — 99100 ANES PT EXTEME AGE<1 YR&>70: CPT | Performed by: ANESTHESIOLOGY

## 2024-07-10 PROCEDURE — 120N000003 HC R&B IMCU UMMC

## 2024-07-10 PROCEDURE — 93975 VASCULAR STUDY: CPT | Mod: 26 | Performed by: RADIOLOGY

## 2024-07-10 PROCEDURE — 36415 COLL VENOUS BLD VENIPUNCTURE: CPT | Performed by: TRANSPLANT SURGERY

## 2024-07-10 PROCEDURE — 99207 PR PREOP VISIT IN GLOBAL PKG: CPT | Mod: GC | Performed by: TRANSPLANT SURGERY

## 2024-07-10 PROCEDURE — 87149 DNA/RNA DIRECT PROBE: CPT | Performed by: TRANSPLANT SURGERY

## 2024-07-10 PROCEDURE — 86900 BLOOD TYPING SEROLOGIC ABO: CPT | Performed by: TRANSPLANT SURGERY

## 2024-07-10 PROCEDURE — 85610 PROTHROMBIN TIME: CPT | Performed by: STUDENT IN AN ORGANIZED HEALTH CARE EDUCATION/TRAINING PROGRAM

## 2024-07-10 PROCEDURE — BF502Z0 OTHER IMAGING OF BILE DUCTS USING FLUORESCING AGENT, INTRAOPERATIVE: ICD-10-PCS | Performed by: TRANSPLANT SURGERY

## 2024-07-10 PROCEDURE — 88300 SURGICAL PATH GROSS: CPT | Mod: 26 | Performed by: PATHOLOGY

## 2024-07-10 PROCEDURE — 250N000011 HC RX IP 250 OP 636

## 2024-07-10 PROCEDURE — 74300 X-RAY BILE DUCTS/PANCREAS: CPT | Mod: 26 | Performed by: TRANSPLANT SURGERY

## 2024-07-10 PROCEDURE — 0FN00ZZ RELEASE LIVER, OPEN APPROACH: ICD-10-PCS | Performed by: TRANSPLANT SURGERY

## 2024-07-10 PROCEDURE — 250N000011 HC RX IP 250 OP 636: Performed by: TRANSPLANT SURGERY

## 2024-07-10 PROCEDURE — 87205 SMEAR GRAM STAIN: CPT | Performed by: TRANSPLANT SURGERY

## 2024-07-10 PROCEDURE — 99100 ANES PT EXTEME AGE<1 YR&>70: CPT | Performed by: NURSE ANESTHETIST, CERTIFIED REGISTERED

## 2024-07-10 PROCEDURE — 87186 SC STD MICRODIL/AGAR DIL: CPT | Performed by: TRANSPLANT SURGERY

## 2024-07-10 PROCEDURE — 47785 FUSE BILE DUCTS AND BOWEL: CPT | Performed by: NURSE ANESTHETIST, CERTIFIED REGISTERED

## 2024-07-10 PROCEDURE — 86923 COMPATIBILITY TEST ELECTRIC: CPT | Performed by: TRANSPLANT SURGERY

## 2024-07-10 PROCEDURE — 258N000003 HC RX IP 258 OP 636: Performed by: STUDENT IN AN ORGANIZED HEALTH CARE EDUCATION/TRAINING PROGRAM

## 2024-07-10 PROCEDURE — 250N000012 HC RX MED GY IP 250 OP 636 PS 637: Performed by: STUDENT IN AN ORGANIZED HEALTH CARE EDUCATION/TRAINING PROGRAM

## 2024-07-10 PROCEDURE — 84295 ASSAY OF SERUM SODIUM: CPT

## 2024-07-10 PROCEDURE — 255N000002 HC RX 255 OP 636: Performed by: TRANSPLANT SURGERY

## 2024-07-10 PROCEDURE — 250N000011 HC RX IP 250 OP 636: Performed by: STUDENT IN AN ORGANIZED HEALTH CARE EDUCATION/TRAINING PROGRAM

## 2024-07-10 PROCEDURE — 999N000104 HIV RAPID ANTIBODY SCREEN: Performed by: INTERNAL MEDICINE

## 2024-07-10 PROCEDURE — 82810 BLOOD GASES O2 SAT ONLY: CPT

## 2024-07-10 PROCEDURE — 999N000179 XR SURGERY CARM FLUORO LESS THAN 5 MIN W STILLS: Mod: TC

## 2024-07-10 PROCEDURE — 47785 FUSE BILE DUCTS AND BOWEL: CPT | Performed by: ANESTHESIOLOGY

## 2024-07-10 PROCEDURE — 258N000003 HC RX IP 258 OP 636: Performed by: ANESTHESIOLOGY

## 2024-07-10 PROCEDURE — 47785 FUSE BILE DUCTS AND BOWEL: CPT | Mod: 22 | Performed by: TRANSPLANT SURGERY

## 2024-07-10 PROCEDURE — 83605 ASSAY OF LACTIC ACID: CPT | Performed by: TRANSPLANT SURGERY

## 2024-07-10 PROCEDURE — 84295 ASSAY OF SERUM SODIUM: CPT | Performed by: STUDENT IN AN ORGANIZED HEALTH CARE EDUCATION/TRAINING PROGRAM

## 2024-07-10 PROCEDURE — 82805 BLOOD GASES W/O2 SATURATION: CPT | Performed by: TRANSPLANT SURGERY

## 2024-07-10 PROCEDURE — 250N000025 HC SEVOFLURANE, PER MIN: Performed by: TRANSPLANT SURGERY

## 2024-07-10 PROCEDURE — 87102 FUNGUS ISOLATION CULTURE: CPT | Performed by: TRANSPLANT SURGERY

## 2024-07-10 PROCEDURE — 0F170DB BYPASS COMMON HEPATIC DUCT TO SMALL INTESTINE WITH INTRALUMINAL DEVICE, OPEN APPROACH: ICD-10-PCS | Performed by: TRANSPLANT SURGERY

## 2024-07-10 PROCEDURE — 82330 ASSAY OF CALCIUM: CPT

## 2024-07-10 PROCEDURE — 272N000004 HC RX 272: Performed by: STUDENT IN AN ORGANIZED HEALTH CARE EDUCATION/TRAINING PROGRAM

## 2024-07-10 PROCEDURE — 87075 CULTR BACTERIA EXCEPT BLOOD: CPT | Performed by: TRANSPLANT SURGERY

## 2024-07-10 PROCEDURE — P9016 RBC LEUKOCYTES REDUCED: HCPCS | Performed by: TRANSPLANT SURGERY

## 2024-07-10 PROCEDURE — 88300 SURGICAL PATH GROSS: CPT | Mod: TC | Performed by: TRANSPLANT SURGERY

## 2024-07-10 PROCEDURE — 250N000013 HC RX MED GY IP 250 OP 250 PS 637: Performed by: TRANSPLANT SURGERY

## 2024-07-10 PROCEDURE — 360N000077 HC SURGERY LEVEL 4, PER MIN: Performed by: TRANSPLANT SURGERY

## 2024-07-10 PROCEDURE — 93975 VASCULAR STUDY: CPT

## 2024-07-10 PROCEDURE — 85730 THROMBOPLASTIN TIME PARTIAL: CPT | Performed by: STUDENT IN AN ORGANIZED HEALTH CARE EDUCATION/TRAINING PROGRAM

## 2024-07-10 PROCEDURE — 85014 HEMATOCRIT: CPT | Performed by: STUDENT IN AN ORGANIZED HEALTH CARE EDUCATION/TRAINING PROGRAM

## 2024-07-10 PROCEDURE — 258N000003 HC RX IP 258 OP 636: Performed by: TRANSPLANT SURGERY

## 2024-07-10 PROCEDURE — 85384 FIBRINOGEN ACTIVITY: CPT | Performed by: STUDENT IN AN ORGANIZED HEALTH CARE EDUCATION/TRAINING PROGRAM

## 2024-07-10 PROCEDURE — 710N000010 HC RECOVERY PHASE 1, LEVEL 2, PER MIN: Performed by: TRANSPLANT SURGERY

## 2024-07-10 PROCEDURE — 999N000141 HC STATISTIC PRE-PROCEDURE NURSING ASSESSMENT: Performed by: TRANSPLANT SURGERY

## 2024-07-10 PROCEDURE — 272N000001 HC OR GENERAL SUPPLY STERILE: Performed by: TRANSPLANT SURGERY

## 2024-07-10 PROCEDURE — 80299 QUANTITATIVE ASSAY DRUG: CPT | Performed by: TRANSPLANT SURGERY

## 2024-07-10 RX ORDER — NALOXONE HYDROCHLORIDE 0.4 MG/ML
0.2 INJECTION, SOLUTION INTRAMUSCULAR; INTRAVENOUS; SUBCUTANEOUS
Status: DISCONTINUED | OUTPATIENT
Start: 2024-07-10 | End: 2024-07-18 | Stop reason: HOSPADM

## 2024-07-10 RX ORDER — DEXMEDETOMIDINE HYDROCHLORIDE 4 UG/ML
INJECTION, SOLUTION INTRAVENOUS
Status: COMPLETED | OUTPATIENT
Start: 2024-07-10 | End: 2024-07-10

## 2024-07-10 RX ORDER — NALOXONE HYDROCHLORIDE 0.4 MG/ML
0.2 INJECTION, SOLUTION INTRAMUSCULAR; INTRAVENOUS; SUBCUTANEOUS
Status: DISCONTINUED | OUTPATIENT
Start: 2024-07-10 | End: 2024-07-10 | Stop reason: HOSPADM

## 2024-07-10 RX ORDER — SODIUM CHLORIDE, SODIUM LACTATE, POTASSIUM CHLORIDE, CALCIUM CHLORIDE 600; 310; 30; 20 MG/100ML; MG/100ML; MG/100ML; MG/100ML
INJECTION, SOLUTION INTRAVENOUS CONTINUOUS
Status: DISCONTINUED | OUTPATIENT
Start: 2024-07-10 | End: 2024-07-10 | Stop reason: HOSPADM

## 2024-07-10 RX ORDER — METOPROLOL TARTRATE 1 MG/ML
5 INJECTION, SOLUTION INTRAVENOUS EVERY 6 HOURS
Status: DISCONTINUED | OUTPATIENT
Start: 2024-07-11 | End: 2024-07-17

## 2024-07-10 RX ORDER — PROPOFOL 10 MG/ML
INJECTION, EMULSION INTRAVENOUS PRN
Status: DISCONTINUED | OUTPATIENT
Start: 2024-07-10 | End: 2024-07-10

## 2024-07-10 RX ORDER — FLUCONAZOLE 2 MG/ML
200 INJECTION, SOLUTION INTRAVENOUS EVERY 24 HOURS
Status: DISCONTINUED | OUTPATIENT
Start: 2024-07-10 | End: 2024-07-10 | Stop reason: HOSPADM

## 2024-07-10 RX ORDER — VANCOMYCIN HYDROCHLORIDE 1 G/200ML
1000 INJECTION, SOLUTION INTRAVENOUS
Status: DISCONTINUED | OUTPATIENT
Start: 2024-07-10 | End: 2024-07-11

## 2024-07-10 RX ORDER — FLUCONAZOLE 2 MG/ML
200 INJECTION, SOLUTION INTRAVENOUS EVERY 24 HOURS
Status: COMPLETED | OUTPATIENT
Start: 2024-07-11 | End: 2024-07-13

## 2024-07-10 RX ORDER — ONDANSETRON 4 MG/1
4 TABLET, ORALLY DISINTEGRATING ORAL EVERY 6 HOURS PRN
Status: DISCONTINUED | OUTPATIENT
Start: 2024-07-10 | End: 2024-07-18 | Stop reason: HOSPADM

## 2024-07-10 RX ORDER — HYDROMORPHONE HCL IN WATER/PF 6 MG/30 ML
0.4 PATIENT CONTROLLED ANALGESIA SYRINGE INTRAVENOUS
Status: DISCONTINUED | OUTPATIENT
Start: 2024-07-10 | End: 2024-07-14

## 2024-07-10 RX ORDER — AMOXICILLIN 250 MG
1 CAPSULE ORAL 2 TIMES DAILY
Status: DISCONTINUED | OUTPATIENT
Start: 2024-07-10 | End: 2024-07-18 | Stop reason: HOSPADM

## 2024-07-10 RX ORDER — ONDANSETRON 2 MG/ML
INJECTION INTRAMUSCULAR; INTRAVENOUS PRN
Status: DISCONTINUED | OUTPATIENT
Start: 2024-07-10 | End: 2024-07-10

## 2024-07-10 RX ORDER — LIDOCAINE 40 MG/G
CREAM TOPICAL
Status: DISCONTINUED | OUTPATIENT
Start: 2024-07-10 | End: 2024-07-18 | Stop reason: HOSPADM

## 2024-07-10 RX ORDER — BISACODYL 10 MG
10 SUPPOSITORY, RECTAL RECTAL DAILY PRN
Status: DISCONTINUED | OUTPATIENT
Start: 2024-07-13 | End: 2024-07-18 | Stop reason: HOSPADM

## 2024-07-10 RX ORDER — LABETALOL HYDROCHLORIDE 5 MG/ML
20 INJECTION, SOLUTION INTRAVENOUS ONCE
Status: COMPLETED | OUTPATIENT
Start: 2024-07-10 | End: 2024-07-10

## 2024-07-10 RX ORDER — PIPERACILLIN SODIUM, TAZOBACTAM SODIUM 3; .375 G/15ML; G/15ML
3.38 INJECTION, POWDER, LYOPHILIZED, FOR SOLUTION INTRAVENOUS EVERY 6 HOURS
Status: DISCONTINUED | OUTPATIENT
Start: 2024-07-10 | End: 2024-07-11

## 2024-07-10 RX ORDER — VANCOMYCIN HYDROCHLORIDE 1 G/200ML
1000 INJECTION, SOLUTION INTRAVENOUS
Status: COMPLETED | OUTPATIENT
Start: 2024-07-10 | End: 2024-07-10

## 2024-07-10 RX ORDER — SODIUM CHLORIDE, SODIUM LACTATE, POTASSIUM CHLORIDE, CALCIUM CHLORIDE 600; 310; 30; 20 MG/100ML; MG/100ML; MG/100ML; MG/100ML
INJECTION, SOLUTION INTRAVENOUS CONTINUOUS PRN
Status: DISCONTINUED | OUTPATIENT
Start: 2024-07-10 | End: 2024-07-10

## 2024-07-10 RX ORDER — ONDANSETRON 2 MG/ML
4 INJECTION INTRAMUSCULAR; INTRAVENOUS EVERY 6 HOURS PRN
Status: DISCONTINUED | OUTPATIENT
Start: 2024-07-10 | End: 2024-07-18 | Stop reason: HOSPADM

## 2024-07-10 RX ORDER — FENTANYL CITRATE 50 UG/ML
INJECTION, SOLUTION INTRAMUSCULAR; INTRAVENOUS PRN
Status: DISCONTINUED | OUTPATIENT
Start: 2024-07-10 | End: 2024-07-10

## 2024-07-10 RX ORDER — NICOTINE POLACRILEX 4 MG
15-30 LOZENGE BUCCAL
Status: DISCONTINUED | OUTPATIENT
Start: 2024-07-10 | End: 2024-07-11

## 2024-07-10 RX ORDER — NALOXONE HYDROCHLORIDE 0.4 MG/ML
0.4 INJECTION, SOLUTION INTRAMUSCULAR; INTRAVENOUS; SUBCUTANEOUS
Status: DISCONTINUED | OUTPATIENT
Start: 2024-07-10 | End: 2024-07-18 | Stop reason: HOSPADM

## 2024-07-10 RX ORDER — NALOXONE HYDROCHLORIDE 0.4 MG/ML
0.1 INJECTION, SOLUTION INTRAMUSCULAR; INTRAVENOUS; SUBCUTANEOUS
Status: DISCONTINUED | OUTPATIENT
Start: 2024-07-10 | End: 2024-07-10 | Stop reason: HOSPADM

## 2024-07-10 RX ORDER — IRRIGATION SET
800 IRRIGATION SET MISCELLANEOUS EVERY 24 HOURS
Status: DISCONTINUED | OUTPATIENT
Start: 2024-07-10 | End: 2024-07-10

## 2024-07-10 RX ORDER — HYDROMORPHONE HCL IN WATER/PF 6 MG/30 ML
0.2 PATIENT CONTROLLED ANALGESIA SYRINGE INTRAVENOUS
Status: DISCONTINUED | OUTPATIENT
Start: 2024-07-10 | End: 2024-07-14

## 2024-07-10 RX ORDER — FAMOTIDINE 20 MG/1
20 TABLET, FILM COATED ORAL 2 TIMES DAILY
Status: DISCONTINUED | OUTPATIENT
Start: 2024-07-10 | End: 2024-07-18 | Stop reason: HOSPADM

## 2024-07-10 RX ORDER — HYDROMORPHONE HYDROCHLORIDE 4 MG/1
4 TABLET ORAL EVERY 4 HOURS PRN
Status: DISCONTINUED | OUTPATIENT
Start: 2024-07-10 | End: 2024-07-16

## 2024-07-10 RX ORDER — PROCHLORPERAZINE MALEATE 5 MG
5 TABLET ORAL EVERY 6 HOURS PRN
Status: DISCONTINUED | OUTPATIENT
Start: 2024-07-10 | End: 2024-07-18 | Stop reason: HOSPADM

## 2024-07-10 RX ORDER — ONDANSETRON 2 MG/ML
4 INJECTION INTRAMUSCULAR; INTRAVENOUS EVERY 30 MIN PRN
Status: DISCONTINUED | OUTPATIENT
Start: 2024-07-10 | End: 2024-07-10 | Stop reason: HOSPADM

## 2024-07-10 RX ORDER — LIDOCAINE HYDROCHLORIDE 20 MG/ML
INJECTION, SOLUTION INFILTRATION; PERINEURAL PRN
Status: DISCONTINUED | OUTPATIENT
Start: 2024-07-10 | End: 2024-07-10

## 2024-07-10 RX ORDER — HYDROMORPHONE HCL IN WATER/PF 6 MG/30 ML
0.2 PATIENT CONTROLLED ANALGESIA SYRINGE INTRAVENOUS EVERY 5 MIN PRN
Status: DISCONTINUED | OUTPATIENT
Start: 2024-07-10 | End: 2024-07-10 | Stop reason: HOSPADM

## 2024-07-10 RX ORDER — DEXTROSE MONOHYDRATE 25 G/50ML
25-50 INJECTION, SOLUTION INTRAVENOUS
Status: DISCONTINUED | OUTPATIENT
Start: 2024-07-10 | End: 2024-07-11

## 2024-07-10 RX ORDER — ONDANSETRON 4 MG/1
4 TABLET, ORALLY DISINTEGRATING ORAL EVERY 30 MIN PRN
Status: DISCONTINUED | OUTPATIENT
Start: 2024-07-10 | End: 2024-07-10 | Stop reason: HOSPADM

## 2024-07-10 RX ORDER — ACETAMINOPHEN 325 MG/1
975 TABLET ORAL EVERY 8 HOURS
Status: ACTIVE | OUTPATIENT
Start: 2024-07-10 | End: 2024-07-13

## 2024-07-10 RX ORDER — BUPIVACAINE HYDROCHLORIDE 2.5 MG/ML
INJECTION, SOLUTION EPIDURAL; INFILTRATION; INTRACAUDAL
Status: COMPLETED | OUTPATIENT
Start: 2024-07-10 | End: 2024-07-10

## 2024-07-10 RX ORDER — NALOXONE HYDROCHLORIDE 0.4 MG/ML
0.4 INJECTION, SOLUTION INTRAMUSCULAR; INTRAVENOUS; SUBCUTANEOUS
Status: DISCONTINUED | OUTPATIENT
Start: 2024-07-10 | End: 2024-07-10 | Stop reason: HOSPADM

## 2024-07-10 RX ORDER — DEXAMETHASONE SODIUM PHOSPHATE 4 MG/ML
4 INJECTION, SOLUTION INTRA-ARTICULAR; INTRALESIONAL; INTRAMUSCULAR; INTRAVENOUS; SOFT TISSUE
Status: DISCONTINUED | OUTPATIENT
Start: 2024-07-10 | End: 2024-07-10 | Stop reason: HOSPADM

## 2024-07-10 RX ORDER — BUPIVACAINE HYDROCHLORIDE AND EPINEPHRINE 2.5; 5 MG/ML; UG/ML
INJECTION, SOLUTION INFILTRATION; PERINEURAL
Status: COMPLETED | OUTPATIENT
Start: 2024-07-10 | End: 2024-07-10

## 2024-07-10 RX ORDER — PIPERACILLIN SODIUM, TAZOBACTAM SODIUM 3; .375 G/15ML; G/15ML
3.38 INJECTION, POWDER, LYOPHILIZED, FOR SOLUTION INTRAVENOUS EVERY 8 HOURS
Status: DISCONTINUED | OUTPATIENT
Start: 2024-07-10 | End: 2024-07-10

## 2024-07-10 RX ORDER — DEXTROSE MONOHYDRATE, SODIUM CHLORIDE, AND POTASSIUM CHLORIDE 50; 1.49; 4.5 G/1000ML; G/1000ML; G/1000ML
INJECTION, SOLUTION INTRAVENOUS CONTINUOUS
Status: DISCONTINUED | OUTPATIENT
Start: 2024-07-10 | End: 2024-07-12

## 2024-07-10 RX ORDER — FENTANYL CITRATE 50 UG/ML
50 INJECTION, SOLUTION INTRAMUSCULAR; INTRAVENOUS EVERY 5 MIN PRN
Status: DISCONTINUED | OUTPATIENT
Start: 2024-07-10 | End: 2024-07-10 | Stop reason: HOSPADM

## 2024-07-10 RX ORDER — ESMOLOL HYDROCHLORIDE 10 MG/ML
INJECTION INTRAVENOUS PRN
Status: DISCONTINUED | OUTPATIENT
Start: 2024-07-10 | End: 2024-07-10

## 2024-07-10 RX ORDER — SODIUM CHLORIDE, SODIUM GLUCONATE, SODIUM ACETATE, POTASSIUM CHLORIDE AND MAGNESIUM CHLORIDE 526; 502; 368; 37; 30 MG/100ML; MG/100ML; MG/100ML; MG/100ML; MG/100ML
INJECTION, SOLUTION INTRAVENOUS CONTINUOUS PRN
Status: DISCONTINUED | OUTPATIENT
Start: 2024-07-10 | End: 2024-07-10

## 2024-07-10 RX ORDER — PIPERACILLIN SODIUM, TAZOBACTAM SODIUM 3; .375 G/15ML; G/15ML
3.38 INJECTION, POWDER, LYOPHILIZED, FOR SOLUTION INTRAVENOUS ONCE
Status: COMPLETED | OUTPATIENT
Start: 2024-07-10 | End: 2024-07-10

## 2024-07-10 RX ORDER — IOPAMIDOL 510 MG/ML
INJECTION, SOLUTION INTRAVASCULAR PRN
Status: DISCONTINUED | OUTPATIENT
Start: 2024-07-10 | End: 2024-07-10 | Stop reason: HOSPADM

## 2024-07-10 RX ORDER — HYDROMORPHONE HYDROCHLORIDE 4 MG/1
2 TABLET ORAL EVERY 4 HOURS PRN
Status: DISCONTINUED | OUTPATIENT
Start: 2024-07-10 | End: 2024-07-18

## 2024-07-10 RX ORDER — POLYETHYLENE GLYCOL 3350 17 G/17G
17 POWDER, FOR SOLUTION ORAL DAILY
Status: DISCONTINUED | OUTPATIENT
Start: 2024-07-11 | End: 2024-07-18 | Stop reason: HOSPADM

## 2024-07-10 RX ORDER — FENTANYL CITRATE 50 UG/ML
25 INJECTION, SOLUTION INTRAMUSCULAR; INTRAVENOUS EVERY 5 MIN PRN
Status: DISCONTINUED | OUTPATIENT
Start: 2024-07-10 | End: 2024-07-10 | Stop reason: HOSPADM

## 2024-07-10 RX ORDER — HYDROMORPHONE HCL IN WATER/PF 6 MG/30 ML
0.4 PATIENT CONTROLLED ANALGESIA SYRINGE INTRAVENOUS EVERY 5 MIN PRN
Status: DISCONTINUED | OUTPATIENT
Start: 2024-07-10 | End: 2024-07-10 | Stop reason: HOSPADM

## 2024-07-10 RX ORDER — HEPARIN SODIUM 10000 [USP'U]/ML
4000 INJECTION, SOLUTION INTRAVENOUS; SUBCUTANEOUS EVERY 8 HOURS
Status: DISCONTINUED | OUTPATIENT
Start: 2024-07-11 | End: 2024-07-11

## 2024-07-10 RX ORDER — ACETAMINOPHEN 325 MG/1
650 TABLET ORAL EVERY 4 HOURS PRN
Status: DISCONTINUED | OUTPATIENT
Start: 2024-07-13 | End: 2024-07-18 | Stop reason: HOSPADM

## 2024-07-10 RX ORDER — LABETALOL HYDROCHLORIDE 5 MG/ML
10 INJECTION, SOLUTION INTRAVENOUS ONCE
Status: COMPLETED | OUTPATIENT
Start: 2024-07-10 | End: 2024-07-10

## 2024-07-10 RX ORDER — FENTANYL CITRATE 50 UG/ML
25-50 INJECTION, SOLUTION INTRAMUSCULAR; INTRAVENOUS
Status: DISCONTINUED | OUTPATIENT
Start: 2024-07-10 | End: 2024-07-10 | Stop reason: HOSPADM

## 2024-07-10 RX ORDER — DEXAMETHASONE SODIUM PHOSPHATE 4 MG/ML
INJECTION, SOLUTION INTRA-ARTICULAR; INTRALESIONAL; INTRAMUSCULAR; INTRAVENOUS; SOFT TISSUE PRN
Status: DISCONTINUED | OUTPATIENT
Start: 2024-07-10 | End: 2024-07-10

## 2024-07-10 RX ORDER — FLUMAZENIL 0.1 MG/ML
0.2 INJECTION, SOLUTION INTRAVENOUS
Status: DISCONTINUED | OUTPATIENT
Start: 2024-07-10 | End: 2024-07-10 | Stop reason: HOSPADM

## 2024-07-10 RX ORDER — HEPARIN SODIUM 10000 [USP'U]/ML
4000 INJECTION, SOLUTION INTRAVENOUS; SUBCUTANEOUS
Status: COMPLETED | OUTPATIENT
Start: 2024-07-10 | End: 2024-07-10

## 2024-07-10 RX ORDER — ACETAMINOPHEN 650 MG/1
650 SUPPOSITORY RECTAL ONCE
Status: COMPLETED | OUTPATIENT
Start: 2024-07-10 | End: 2024-07-10

## 2024-07-10 RX ADMIN — DEXAMETHASONE SODIUM PHOSPHATE 4 MG: 4 INJECTION, SOLUTION INTRA-ARTICULAR; INTRALESIONAL; INTRAMUSCULAR; INTRAVENOUS; SOFT TISSUE at 08:15

## 2024-07-10 RX ADMIN — Medication 10 MG: at 09:30

## 2024-07-10 RX ADMIN — PHENYLEPHRINE HYDROCHLORIDE 50 MCG: 10 INJECTION INTRAVENOUS at 11:38

## 2024-07-10 RX ADMIN — FENTANYL CITRATE 25 MCG: 50 INJECTION, SOLUTION INTRAMUSCULAR; INTRAVENOUS at 20:24

## 2024-07-10 RX ADMIN — HYDROMORPHONE HYDROCHLORIDE 0.5 MG: 1 INJECTION, SOLUTION INTRAMUSCULAR; INTRAVENOUS; SUBCUTANEOUS at 14:48

## 2024-07-10 RX ADMIN — Medication 10 MG: at 14:27

## 2024-07-10 RX ADMIN — SODIUM CHLORIDE, POTASSIUM CHLORIDE, SODIUM LACTATE AND CALCIUM CHLORIDE: 600; 310; 30; 20 INJECTION, SOLUTION INTRAVENOUS at 07:40

## 2024-07-10 RX ADMIN — Medication 10 MG: at 12:01

## 2024-07-10 RX ADMIN — HYDROMORPHONE HYDROCHLORIDE 0.5 MG: 1 INJECTION, SOLUTION INTRAMUSCULAR; INTRAVENOUS; SUBCUTANEOUS at 09:40

## 2024-07-10 RX ADMIN — Medication 10 MG: at 08:45

## 2024-07-10 RX ADMIN — PIPERACILLIN AND TAZOBACTAM 3.38 G: 3; .375 INJECTION, POWDER, FOR SOLUTION INTRAVENOUS at 14:27

## 2024-07-10 RX ADMIN — POTASSIUM CHLORIDE, DEXTROSE MONOHYDRATE AND SODIUM CHLORIDE: 150; 5; 450 INJECTION, SOLUTION INTRAVENOUS at 17:45

## 2024-07-10 RX ADMIN — Medication 1 EACH: at 19:36

## 2024-07-10 RX ADMIN — BUPIVACAINE HYDROCHLORIDE 10 ML: 2.5 INJECTION, SOLUTION EPIDURAL; INFILTRATION; INTRACAUDAL at 07:10

## 2024-07-10 RX ADMIN — INSULIN HUMAN 1.5 UNITS/HR: 1 INJECTION, SOLUTION INTRAVENOUS at 09:13

## 2024-07-10 RX ADMIN — Medication 40 MCG: at 07:10

## 2024-07-10 RX ADMIN — Medication 10 MG: at 10:03

## 2024-07-10 RX ADMIN — BUPIVACAINE HYDROCHLORIDE AND EPINEPHRINE BITARTRATE 40 ML: 2.5; .005 INJECTION, SOLUTION INFILTRATION; PERINEURAL at 07:10

## 2024-07-10 RX ADMIN — Medication 100 MG: at 15:23

## 2024-07-10 RX ADMIN — ONDANSETRON 4 MG: 2 INJECTION INTRAMUSCULAR; INTRAVENOUS at 15:05

## 2024-07-10 RX ADMIN — PROPOFOL 150 MG: 10 INJECTION, EMULSION INTRAVENOUS at 07:52

## 2024-07-10 RX ADMIN — FENTANYL CITRATE 50 MCG: 50 INJECTION INTRAMUSCULAR; INTRAVENOUS at 15:24

## 2024-07-10 RX ADMIN — VANCOMYCIN HYDROCHLORIDE 1000 MG: 1 INJECTION, SOLUTION INTRAVENOUS at 07:05

## 2024-07-10 RX ADMIN — FENTANYL CITRATE 50 MCG: 50 INJECTION, SOLUTION INTRAMUSCULAR; INTRAVENOUS at 07:17

## 2024-07-10 RX ADMIN — HYDROMORPHONE HYDROCHLORIDE 0.2 MG: 0.2 INJECTION, SOLUTION INTRAMUSCULAR; INTRAVENOUS; SUBCUTANEOUS at 22:00

## 2024-07-10 RX ADMIN — ACETAMINOPHEN 650 MG: 325 SUPPOSITORY RECTAL at 06:56

## 2024-07-10 RX ADMIN — LIDOCAINE HYDROCHLORIDE 60 MG: 20 INJECTION, SOLUTION INFILTRATION; PERINEURAL at 07:52

## 2024-07-10 RX ADMIN — FENTANYL CITRATE 50 MCG: 50 INJECTION INTRAMUSCULAR; INTRAVENOUS at 08:52

## 2024-07-10 RX ADMIN — SODIUM BICARBONATE 25 MEQ: 84 INJECTION, SOLUTION INTRAVENOUS at 11:35

## 2024-07-10 RX ADMIN — HEPARIN SODIUM 4000 UNITS: 5000 INJECTION, SOLUTION INTRAVENOUS; SUBCUTANEOUS at 07:33

## 2024-07-10 RX ADMIN — ESMOLOL HYDROCHLORIDE 80 MG: 10 INJECTION, SOLUTION INTRAVENOUS at 07:52

## 2024-07-10 RX ADMIN — PHENYLEPHRINE HYDROCHLORIDE 50 MCG: 10 INJECTION INTRAVENOUS at 10:29

## 2024-07-10 RX ADMIN — LABETALOL HYDROCHLORIDE 10 MG: 5 INJECTION, SOLUTION INTRAVENOUS at 17:27

## 2024-07-10 RX ADMIN — PHENYLEPHRINE HYDROCHLORIDE 50 MCG: 10 INJECTION INTRAVENOUS at 11:22

## 2024-07-10 RX ADMIN — Medication 10 MG: at 13:22

## 2024-07-10 RX ADMIN — Medication 10 MG: at 13:06

## 2024-07-10 RX ADMIN — METOPROLOL TARTRATE 5 MG: 5 INJECTION INTRAVENOUS at 16:30

## 2024-07-10 RX ADMIN — PIPERACILLIN AND TAZOBACTAM 3.38 G: 3; .375 INJECTION, POWDER, FOR SOLUTION INTRAVENOUS at 08:00

## 2024-07-10 RX ADMIN — LABETALOL HYDROCHLORIDE 10 MG: 5 INJECTION, SOLUTION INTRAVENOUS at 17:45

## 2024-07-10 RX ADMIN — FLUCONAZOLE IN SODIUM CHLORIDE 200 MG: 2 INJECTION, SOLUTION INTRAVENOUS at 08:05

## 2024-07-10 RX ADMIN — INSULIN ASPART 0.5 UNITS: 100 INJECTION, SOLUTION INTRAVENOUS; SUBCUTANEOUS at 19:39

## 2024-07-10 RX ADMIN — FENTANYL CITRATE 50 MCG: 50 INJECTION INTRAMUSCULAR; INTRAVENOUS at 08:48

## 2024-07-10 RX ADMIN — Medication 10 MG: at 11:08

## 2024-07-10 RX ADMIN — SODIUM CHLORIDE, SODIUM GLUCONATE, SODIUM ACETATE, POTASSIUM CHLORIDE AND MAGNESIUM CHLORIDE: 526; 502; 368; 37; 30 INJECTION, SOLUTION INTRAVENOUS at 08:05

## 2024-07-10 RX ADMIN — FENTANYL CITRATE 50 MCG: 50 INJECTION INTRAMUSCULAR; INTRAVENOUS at 09:42

## 2024-07-10 RX ADMIN — SODIUM CHLORIDE, SODIUM GLUCONATE, SODIUM ACETATE, POTASSIUM CHLORIDE AND MAGNESIUM CHLORIDE: 526; 502; 368; 37; 30 INJECTION, SOLUTION INTRAVENOUS at 12:03

## 2024-07-10 RX ADMIN — MIDAZOLAM 0.25 MG: 1 INJECTION INTRAMUSCULAR; INTRAVENOUS at 07:17

## 2024-07-10 RX ADMIN — Medication 50 MG: at 07:53

## 2024-07-10 RX ADMIN — Medication 100 MG: at 15:22

## 2024-07-10 ASSESSMENT — ACTIVITIES OF DAILY LIVING (ADL)
ADLS_ACUITY_SCORE: 37
ADLS_ACUITY_SCORE: 36
ADLS_ACUITY_SCORE: 37
ADLS_ACUITY_SCORE: 36
ADLS_ACUITY_SCORE: 37
ADLS_ACUITY_SCORE: 33
ADLS_ACUITY_SCORE: 37
ADLS_ACUITY_SCORE: 36
ADLS_ACUITY_SCORE: 37
ADLS_ACUITY_SCORE: 36
ADLS_ACUITY_SCORE: 33

## 2024-07-10 ASSESSMENT — LIFESTYLE VARIABLES: TOBACCO_USE: 1

## 2024-07-10 ASSESSMENT — ENCOUNTER SYMPTOMS: SEIZURES: 0

## 2024-07-10 NOTE — ANESTHESIA PROCEDURE NOTES
Arterial Line Procedure Note    Pre-Procedure   Staff -        Performed By: PABLO       Location: OR       Pre-Anesthestic Checklist: patient identified, IV checked, risks and benefits discussed, informed consent, monitors and equipment checked, pre-op evaluation and at physician/surgeon's request  Timeout:       Correct Patient: Yes        Correct Procedure: Yes        Correct Site: Yes        Correct Position: Yes   Line Placement:   This line was placed Post Induction  Procedure   Procedure: arterial line       Laterality: left       Insertion Site: radial.  Sterile Prep        Standard elements of sterile barrier followed       Skin prep: Chloraprep  Insertion/Injection        Technique: ultrasound guided        1. Ultrasound was used to evaluate the access site.       2. Artery evaluated via ultrasound for patency/adequacy.       3. Using real-time ultrasound the needle/catheter was observed entering the artery/vein.       Catheter Type/Size: 20 G, 1.75 in/4.5 cm quick cath (integral wire)  Narrative        Tegaderm dressing used.       Complications: None apparent,        Arterial waveform: Yes        IBP within 10% of NIBP: Yes

## 2024-07-10 NOTE — ANESTHESIA POSTPROCEDURE EVALUATION
Patient: Rocío Russell    Procedure: Procedure(s):  HEPATICOJEJUNOSTOMY, Tamiko En-Y, Lysis of adhesions       Anesthesia Type:  General    Note:  Disposition: Admission   Postop Pain Control: Uneventful            Sign Out: Well controlled pain   PONV: No   Neuro/Psych: Uneventful            Sign Out: Acceptable/Baseline neuro status   Airway/Respiratory: Uneventful            Sign Out: Acceptable/Baseline resp. status   CV/Hemodynamics: Uneventful            Sign Out: Acceptable CV status; No obvious hypovolemia; No obvious fluid overload   Other NRE: NONE   DID A NON-ROUTINE EVENT OCCUR? No           Last vitals:  Vitals Value Taken Time   /88 07/10/24 1645   Temp 37.1  C (98.7  F) 07/10/24 1646   Pulse 110 07/10/24 1646   Resp 18 07/10/24 1646   SpO2 100 % 07/10/24 1646   Vitals shown include unfiled device data.    Electronically Signed By: Taran Delong MD  July 10, 2024  4:46 PM  
[email or web address]

## 2024-07-10 NOTE — ANESTHESIA PREPROCEDURE EVALUATION
Anesthesia Pre-Procedure Evaluation    Patient: Rocío Russell   MRN: [id number] : 1950        Procedure : Procedure(s):  HEPATICOJEJUNOSTOMY  REVISION, ANASTOMOSIS, ONEAL-EN-Y          Past Medical History:   Diagnosis Date    Closed fracture of one or more phalanges of foot     Diabetes mellitus (H)     Foot drop     Gastroesophageal reflux disease with esophagitis     HLD (hyperlipidemia)     HTN (hypertension)     Injury of bile duct     Osteopenia     Personal history of DVT (deep vein thrombosis)     Sciatica, unspecified laterality     Tobacco use       Past Surgical History:   Procedure Laterality Date    BREAST LUMPECTOMY W/ NEEDLE LOCALIZATION      CARPAL TUNNEL RELEASE RT/LT Bilateral     CHOLECYSTECTOMY      EXPLORATORY LAPAROTOMY, EVACUATION OF TWO LITERS OF FLUID CONTAINING BLOOD AND BILE, PERITONEAL LAVAGE, DRAINAGE OF RIGHT KIDNEY CYST, PLACEMENT OF DRAIN, INSERTION OF GASTROSTOMY FEEDING TUB      IR ERCP  2023    LUMBAR SPINE SURGERY      LAMINECTOMY DISCECTOMY    RIGHT OOPHORECTOMY        Allergies   Allergen Reactions    Animal Dander Other (See Comments)     Runny nose/watery eyes    Nickel Rash    Perfume Headache    Codeine Other (See Comments) and Unknown     Other reaction(s): Nightmares   Other reaction(s): Nightmares    Other reaction(s): Nightmares    Sulfa Antibiotics      Eye drops      Social History     Tobacco Use    Smoking status: Every Day     Current packs/day: 0.20     Types: Cigarettes     Passive exposure: Current    Smokeless tobacco: Never    Tobacco comments:     5-8per day   Substance Use Topics    Alcohol use: Not Currently      Wt Readings from Last 1 Encounters:   07/10/24 60.8 kg (134 lb 0.6 oz)        Anesthesia Evaluation   Pt has had prior anesthetic. Type: General and MAC.    History of anesthetic complications  - .  the patient had delirium with hallucinations after her multiple surgeries with sepsis.    ROS/MED HX  ENT/Pulmonary:     (+)                 tobacco use, Current use,                       Neurologic: Comment: Bilateral foot drop   (-) no seizures, no CVA and no TIA   Cardiovascular:     (+) Dyslipidemia hypertension- -   -  - -                                 Previous cardiac testing   Echo: Date: 3/19/24 Results:    Stress Test:  Date: Results:    ECG Reviewed:  Date: 4/2/24 Results:  Sinus tachycardia   Otherwise normal ECG     Cath:  Date: Results:      METS/Exercise Tolerance: 1 - Eating, dressing Comment: Patient is limited in activity due to recent foot fracture. At baseline she uses a walker due to bilateral foot drop and sciatica    Hematologic: Comments: Anemia, leukocytosis, thrombocytosis    (+) History of blood clots,    pt is anticoagulated, anemia, history of blood transfusion, no previous transfusion reaction,        Musculoskeletal: Comment: Prior lumbar laminectomy and discectomy. Continue back pain with sciatica  (+)     fracture, Fractures: 4th and 5th metatarsal fracture 7/1/24.         GI/Hepatic: Comment: Bile duct injury/ biliary stricture - s/p drain and ERCP. J PEG in place    (+)          cholecystitis/cholelithiasis (s/p cholecystectomy),          Renal/Genitourinary:       Endo: Comment: osteopenia    (+)  type II DM,                    Psychiatric/Substance Use:  - neg psychiatric ROS   (+)    H/O chronic opiod use .     Infectious Disease:  - neg infectious disease ROS     Malignancy:  - neg malignancy ROS     Other:  - neg other ROS    (+)  , H/O Chronic Pain,         Physical Exam    Airway        Mallampati: II   TM distance: > 3 FB   Neck ROM: full   Mouth opening: > 3 cm    Respiratory Devices and Support         Dental       (+) Completely normal teeth      Cardiovascular          Rhythm and rate: regular and normal     Pulmonary           breath sounds clear to auscultation           OUTSIDE LABS:  CBC:   Lab Results   Component Value Date    WBC 11.5 (H) 07/09/2024    HGB 9.5 (L) 07/09/2024    HCT 30.5 (L)  "07/09/2024     (H) 07/09/2024     BMP:   Lab Results   Component Value Date     07/09/2024    POTASSIUM 4.2 07/09/2024    CHLORIDE 101 07/09/2024    CO2 23 07/09/2024    BUN 31.4 (H) 07/09/2024    CR 0.65 07/09/2024     (H) 07/09/2024     COAGS:   Lab Results   Component Value Date    INR 1.2 (H) 04/17/2024     POC: No results found for: \"BGM\", \"HCG\", \"HCGS\"  HEPATIC:   Lab Results   Component Value Date    ALBUMIN 3.6 07/09/2024    PROTTOTAL 7.0 07/09/2024    ALT 50 07/09/2024    AST 34 07/09/2024    ALKPHOS 278 (H) 07/09/2024    BILITOTAL 0.2 07/09/2024     OTHER:   Lab Results   Component Value Date    A1C 7.4 (H) 07/09/2024    CHEY 9.8 07/09/2024       Anesthesia Plan    ASA Status:  3    NPO Status:  NPO Appropriate    Anesthesia Type: General.     - Airway: ETT   Induction: Intravenous.   Maintenance: Balanced.   Techniques and Equipment:     - Lines/Monitors: 2nd IV, Arterial Line     Consents    Anesthesia Plan(s) and associated risks, benefits, and realistic alternatives discussed. Questions answered and patient/representative(s) expressed understanding.     - Discussed: Risks, Benefits and Alternatives for the PROCEDURE were discussed     - Discussed with:  Patient      - Extended Intubation/Ventilatory Support Discussed: No.      - Patient is DNR/DNI Status: No     Use of blood products discussed: Yes.     - Discussed with: Patient.     - Consented: consented to blood products     Postoperative Care    Pain management: IV analgesics.   PONV prophylaxis: Ondansetron (or other 5HT-3), Dexamethasone or Solumedrol     Comments:               Alonso Deras DO    I have reviewed the pertinent notes and labs in the chart from the past 30 days and (re)examined the patient.  Any updates or changes from those notes are reflected in this note.             # Drug Induced Platelet Defect: home medication list includes an antiplatelet medication  # DMII: A1C = 7.4 % (Ref range: <5.7 %) within past " 6 months

## 2024-07-10 NOTE — ANESTHESIA PROCEDURE NOTES
Airway       Patient location during procedure: OR       Procedure Start/Stop Times: 7/10/2024 7:55 AM  Staff -        Other Anesthesia Staff: Raine Corral       Performed By: SRNA  Consent for Airway        Urgency: elective  Indications and Patient Condition       Indications for airway management: tess-procedural       Induction type:intravenous       Mask difficulty assessment: 1 - vent by mask    Final Airway Details       Final airway type: endotracheal airway       Successful airway: ETT - single and Oral  Endotracheal Airway Details        ETT size (mm): 7.0       Cuffed: yes       Successful intubation technique: direct laryngoscopy       DL Blade Type: MAC 3       Grade View of Cords: 1       Adjucts: stylet       Position: Right       Measured from: lips       Secured at (cm): 22       Bite block used: None    Post intubation assessment        Placement verified by: capnometry, equal breath sounds and chest rise        Number of attempts at approach: 1       Number of other approaches attempted: 0       Secured with: tape       Ease of procedure: easy       Dentition: Intact and Unchanged    Medication(s) Administered   Medication Administration Time: 7/10/2024 7:55 AM

## 2024-07-10 NOTE — LETTER
Recipient:    [facility]      Sender:    Cynthia FitzpatrickJENS, [phone]      Date: July 18, 2024  Patient Name:  Rocío Russell  Patient YOB: 1950  Routing Message:      Sqrrl orders    The documents accompanying this notice contain confidential information belonging to the sender.  This information is intended only for the use of the individual or entity named above.  The authorized recipient of this information is prohibited from disclosing this information to any other party and is required to destroy the information after its stated need has been fulfilled, unless otherwise required by state law.    If you are not the intended recipient, you are hereby notified that any disclosure, copy, distribution or action taken in reliance on the contents of these documents is strictly prohibited.  If you have received this document in error, please return it by fax to [phone] with a note on the cover sheet explaining why you are returning it (e.g. not your patient, not your provider, etc.).  If you need further assistance, please call .  Documents may also be returned by mail to [employer], , [facility] [address], [place], [place] [address].

## 2024-07-10 NOTE — OR NURSING
Bilateral TAP block performed without complications.  Given 50mcg fentanyl and 0.25mg versed. VSS.  Pt tolerated well.  Will continue to monitor.

## 2024-07-10 NOTE — BRIEF OP NOTE
[facility]    Brief Operative Note    Pre-operative diagnosis: Injury of bile duct [S36.13XA]  Post-operative diagnosis Same as pre-operative diagnosis    Procedure: HEPATICOJEJUNOSTOMY, Tamiko En-Y, Lysis of adhesions, N/A - Abdomen    Upper midlin laparotomy  Hepaticojejunostomy   Omental flap creation  2 drains were placed one on the right abdomen posterior to the anastomosis  One on the left abdomen is on the left side anterior to the anastomosis      Surgeon: Surgeons and Role:     * Alejandro Becker MD - Primary  Anesthesia: General with Block   Estimated Blood Loss: Less than 100 ml    Drains: None  Specimens:   ID Type Source Tests Collected by Time Destination   1 : Biliary Stent Tissue Other SURGICAL PATHOLOGY EXAM Alejandro Becker MD 7/10/2024  2:45 PM    A : Bill Duct Fluid Body fluid, unsp Gallbladder ANAEROBIC BACTERIAL CULTURE ROUTINE, GRAM STAIN, FUNGAL OR YEAST CULTURE ROUTINE, AEROBIC BACTERIAL CULTURE ROUTINE Alejandro Becker MD 7/10/2024 10:49 AM      Findings:   None.  Complications: None.  Implants: * No implants in log *

## 2024-07-10 NOTE — ANESTHESIA PROCEDURE NOTES
TAP (B/l subcostal tap block and external intercostal blocks) Procedure Note    Pre-Procedure   Staff -        Anesthesiologist:  Paramjit Villalobos MD       Resident/Fellow: Robin Deras MD       Performed By: resident       Location: pre-op       Procedure Start/Stop Times: 7/10/2024 7:10 AM and 7/10/2024 7:20 AM       Pre-Anesthestic Checklist: patient identified, IV checked, site marked, risks and benefits discussed, informed consent, monitors and equipment checked, pre-op evaluation, at physician/surgeon's request and post-op pain management  Timeout:       Correct Patient: Yes        Correct Procedure: Yes        Correct Site: Yes        Correct Position: Yes        Correct Laterality: Yes        Site Marked: Yes  Procedure Documentation  Procedure: TAP (B/l subcostal tap block and external intercostal blocks)       Laterality: bilateral       Patient Position: supine       Patient Prep/Sterile Barriers: sterile gloves, mask       Skin prep: Chloraprep       Needle Type: short bevel       Needle Gauge: 21.        Needle Length (millimeters): 110        Ultrasound guided       1. Ultrasound was used to identify targeted nerve, plexus, vascular marker, or fascial plane and place a needle adjacent to it in real-time.       2. Ultrasound was used to visualize the spread of anesthetic in close proximity to the above referenced structure.       3. A permanent image is entered into the patient's record.    Assessment/Narrative         The placement was negative for: blood aspirated, painful injection and site bleeding       Paresthesias: No.       Bolus given via needle. no blood aspirated via catheter.        Secured via.        Insertion/Infusion Method: Single Shot       Complications: none    Medication(s) Administered   Bupivacaine 0.25% w/ 1:200K Epi (Injection) - Injection   40 mL - 7/10/2024 7:10:00 AM  Bupivacaine 0.25% PF (Infiltration) - Infiltration   10 mL - 7/10/2024 7:10:00  "AM  Dexmedetomidine 4 mcg/mL (Perineural) - Perineural   40 mcg - 7/10/2024 7:10:00 AM  Medication Administration Time: 7/10/2024 7:10 AM      FOR [facility] ([facility]) ONLY:   Pain Team Contact information: please page the Pain Team Via [employer]. Search \"Pain\". During daytime hours, please page the attending first. At night please page the resident first.      "

## 2024-07-10 NOTE — PROVIDER NOTIFICATION
Lab called into OR with lab results.  From Bile specimen taken intra-op results showed:  4+ gram negative bacilli  4+ gram positive cocci  1+ white blood cell seen    Dr Becker and Dr Howard updated in OR today at 1343.

## 2024-07-10 NOTE — LETTER
Transition Communication Hand-off for Care Transitions to Next Level of Care Provider    Name: Rocío Russell  : 1950  MRN #: [id number]  Primary Care Provider: JIMI Mckinney     Primary Clinic: [address] [place] [id number]     Reason for Hospitalization:  Injury of bile duct [S36.13XA]  Admit Date/Time: 7/10/2024  5:30 AM  Discharge Date: 24  Payor Source: Payor: MEDICARE / Plan: MEDICARE / Product Type: Medicare /              Reason for Communication Hand-off Referral: Fragility    Discharge Plan:       Concern for non-adherence with plan of care:   Y/N N  Discharge Needs Assessment:  Needs      Flowsheet Row Most Recent Value   Equipment Currently Used at Home grab bar, toilet, grab bar, tub/shower, shower chair, walker, rolling, walker, standard, wheelchair, manual, other (see comments), commode chair, dressing device   # of Referrals Placed by [facility]              Follow-up plan:    Future Appointments   Date Time Provider Department Center   2024  4:30 PM Luis Pennington MD [facility] [place]   2024  6:30 AM U2A ROOM 1 [facility]   2024  8:00 AM [address] O       Any outstanding tests or procedures:        Radiology & Cardiology Orders       Future Labs/Procedures Complete By Expires    IR Biliary Tube Check  2024 (Approximate) 2025    Comments:    hx of Tamiko-en-Y hepaticojejunostomy, Intraoperative cholangiogram under fluoroscopy with biliary drain placement 7/10, case discussed between Dr. Patrick and Dr. Becker, drain has reportedly been capped.              Supplies       Future Labs/Procedures    Orthotics, Mastectomy and Custom Compression Orders             Key Recommendations:      Cynthia Fitzpatrick RN    AVS/Discharge Summary is the source of truth; this is a helpful guide for improved communication of patient story

## 2024-07-10 NOTE — H&P
Agree with below notes  [facility], [place]    Transplant Surgery  History and Physical    Rocío Russell  : 1950  MRN # [id number]    ADMIT DATE: 7/10/2024    PCP: Niki Hoyos    CHIEF COMPLAINT: Biliary stricture, post cholecystectomy     HPI: Rocío Russell is a 73 year old female who developed a stricture of the CBD s/p Cholecystectomy. She underwent a thorough workup including dobutamine stress test which revelaled low risk and no ischemia. PTBD was last exchanged 5/10. Currently refeeding up to a liter of bile    ROS:   CONSTITUTIONAL: Denies fever, chills, fatigue, or weight fluctuations  HEAD: Denies headache.  EENT: Denies vision changes, hearing changes, dysphagia, or sore throat.   NECK: Denies lymphadenopathy.   CV:Denies chest pain, palpitations, or shortness of breath.   PULMONARY:Denies shortness of breath, cough, or previous TB exposure.   GI:Denies nausea, vomiting, diarrhea, and abdominal pain. Bowel movements are regular.   :Denies urinary alterations, dysuria, urinary frequency, hematuria, and abnormal drainage.   EXT:Denies lower extremity edema.   SKIN:Denies abnormal rashes or lesions.   MUSCULOSKELETAL:Denies upper or lower extremity weakness and pain.   NEUROLOGIC:Denies lightheadedness, dizziness, seizures, or upper or lower extremity paresthesia.   HEMATOLOGICAL:No abnormal bruising or bleeding.   PSYCHIATRIC:Denies any mood alterations.     PMH:  Past Medical History:   Diagnosis Date    Closed fracture of one or more phalanges of foot     Diabetes mellitus (H)     Foot drop     Gastroesophageal reflux disease with esophagitis     HLD (hyperlipidemia)     HTN (hypertension)     Injury of bile duct     Osteopenia     Personal history of DVT (deep vein thrombosis)     Sciatica, unspecified laterality     Tobacco use        PSH:  Past Surgical History:   Procedure Laterality Date    BREAST LUMPECTOMY W/ NEEDLE LOCALIZATION      CARPAL TUNNEL  RELEASE RT/LT Bilateral     CHOLECYSTECTOMY      EXPLORATORY LAPAROTOMY, EVACUATION OF TWO LITERS OF FLUID CONTAINING BLOOD AND BILE, PERITONEAL LAVAGE, DRAINAGE OF RIGHT KIDNEY CYST, PLACEMENT OF DRAIN, INSERTION OF GASTROSTOMY FEEDING TUB      IR ERCP  12/18/2023    LUMBAR SPINE SURGERY      LAMINECTOMY DISCECTOMY    RIGHT OOPHORECTOMY         MEDICATIONS:  Prior to Admission Medications   Prescriptions Last Dose Informant Patient Reported? Taking?   ACCU-CHEK GUIDE test strip   Yes No   Sig: TEST BLOOD SUGAR THREE TIMES DAILY   CALCIUM PO   Yes No   Sig: Take 600 mg by mouth every morning   COLLAGEN PO   Yes No   Sig: Take by mouth 2 times daily   Continuous Blood Gluc Sensor (FREESTYLE KATHERINE 2 SENSOR) MISC   Yes No   Sig: APPLY ONE NEW SENSOR TO THE BACK OF THE UPPER ARM ONCE EVERY 14 DAYS AS DIRECTED, TO MONITOR BLOOD SUGAR CONTINUOUSLY.   DULOXETINE HCL PO   Yes No   Sig: Take 60 mg by mouth every morning   Glucosamine Sulfate 500 MG TABS   Yes No   Sig: Take 1,000 mg by mouth every morning   Multiple Vitamins-Minerals (PRESERVISION/LUTEIN) CAPS   Yes No   Sig: Take 1 capsule by mouth every morning   Vitamin D, Cholecalciferol, 10 MCG (400 UNIT) TABS   Yes No   Sig: Take 400 Units by mouth every other day   acetaminophen (TYLENOL) 325 MG tablet   Yes No   Sig: Take 650 mg by mouth every 4 hours as needed   amitriptyline (ELAVIL) 10 MG tablet   Yes No   Sig: Take 10 mg by mouth at bedtime   aspirin 81 MG EC tablet   Yes No   Sig: Take 81 mg by mouth   Patient not taking: Reported on 7/9/2024   blood glucose monitoring (SOFTCLIX) lancets   Yes No   fenofibrate (TRIGLIDE/LOFIBRA) 160 MG tablet   Yes No   Sig: Take 160 mg by mouth every morning   ferrous sulfate (FEROSUL) 325 (65 Fe) MG tablet   Yes No   Sig: Take 325 mg by mouth daily (with breakfast)   furosemide (LASIX) 20 MG tablet   Yes No   Sig: Take 20 mg by mouth daily as needed Hasn't needed recently   gabapentin (NEURONTIN) 600 MG tablet   Yes No   Sig:  Take 600 mg by mouth 2 times daily   glimepiride (AMARYL) 1 MG tablet   Yes No   Sig: Take 1 mg by mouth every morning (before breakfast)   lisinopril (ZESTRIL) 40 MG tablet   Yes No   Sig: Take 40 mg by mouth every morning   loratadine (CLARITIN) 10 MG tablet   Yes No   Sig: Take 10 mg by mouth every morning   magnesium gluconate (MAGONATE) 500 MG tablet   Yes No   Sig: Take 250 mg by mouth every morning   metFORMIN osmotic (FORTAMET) 1000 MG 24 hr tablet   Yes No   Sig: Take 500 mg by mouth 2 times daily   oxyCODONE-acetaminophen (PERCOCET) 5-325 MG tablet   Yes No   Sig: Take 1 tablet by mouth every 6 hours as needed   senna-docusate (SENOKOT-S/PERICOLACE) 8.6-50 MG tablet   Yes No   Sig: Take 1 tablet by mouth daily   simethicone (MYLICON) 80 MG chewable tablet   Yes No   Sig: Take 80 mg by mouth every 6 hours as needed   simvastatin (ZOCOR) 40 MG tablet   Yes No   Sig: Take 40 mg by mouth at bedtime      Facility-Administered Medications: None        ALLERGIES:     Allergies   Allergen Reactions    Animal Dander Other (See Comments)     Runny nose/watery eyes    Nickel Rash    Perfume Headache    Codeine Other (See Comments) and Unknown     Other reaction(s): Nightmares   Other reaction(s): Nightmares    Other reaction(s): Nightmares    Sulfa Antibiotics      Eye drops       FAMILY HISTORY:  Family History   Problem Relation Age of Onset    Anesthesia Reaction No family hx of     Deep Vein Thrombosis (DVT) No family hx of        SOCIAL HISTORY:  Social History     Socioeconomic History    Marital status: Single     Spouse name: Not on file    Number of children: Not on file    Years of education: Not on file    Highest education level: Not on file   Occupational History    Not on file   Tobacco Use    Smoking status: Every Day     Current packs/day: 0.20     Types: Cigarettes     Passive exposure: Current    Smokeless tobacco: Never    Tobacco comments:     5-8per day   Substance and Sexual Activity    Alcohol  use: Not Currently    Drug use: Not Currently    Sexual activity: Not on file   Other Topics Concern    Not on file   Social History Narrative    Not on file     Social Determinants of Health     Financial Resource Strain: Low Risk  (5/6/2024)    Received from [facility]    Overall Financial Resource Strain (CARDIA)     Difficulty of Paying Living Expenses: Not hard at all   Food Insecurity: No Food Insecurity (5/6/2024)    Received from [facility]    Hunger Vital Sign     Worried About Running Out of Food in the Last Year: Never true     Ran Out of Food in the Last Year: Never true   Transportation Needs: No Transportation Needs (5/6/2024)    Received from [facility]    PRAPARE - Transportation     Lack of Transportation (Medical): No     Lack of Transportation (Non-Medical): No   Physical Activity: Inactive (11/30/2022)    [facility], [facility]    Exercise Vital Sign     Days of Exercise per Week: 0 days     Minutes of Exercise per Session: 0 min   Stress: No Stress Concern Present (11/30/2022)    Received from [facility], [facility]    [place] [employer] - Occupational Stress Questionnaire     Feeling of Stress : Not at all   Social Connections: Moderately Isolated (11/30/2022)    Received from [facility], [facility]    Social Connection and Isolation Panel [NHANES]     Frequency of Communication with Friends and Family: Twice a week     Frequency of Social Gatherings with Friends and Family: More than three times a week     Attends Alevism Services: 1 to 4 times per year     Active Member of Clubs or Organizations: No     Attends Club or Organization Meetings: Never     Marital Status:    Interpersonal Safety: Not At Risk (1/26/2024)    Received from [facility]  "   EH IP Custom IPV     Do you feel UNSAFE in any of your personal relationships with your family members or any other acquaintances?: No   Housing Stability: Low Risk  (5/6/2024)    Received from [facility] and [facility]    Housing Stability Vital Sign     Unable to Pay for Housing in the Last Year: No     Number of Places Lived in the Last Year: 1     Unstable Housing in the Last Year: No       PHYSICAL EXAM:  Height 1.613 m (5' 3.5\"), weight 60.8 kg (134 lb 0.6 oz).  GENERAL: Appears alert and oriented times three.   HEENT: Eye symmetrical and free of discharge bilaterally. Mucous membranes moist and without lesions.  NECK: Supple and without lymphadenopathy.  CV: RRR,  RESPIRATORY: Respirations regular, even, and unlabored.    GI: Soft and non distended with normoactive bowel sounds present in all quadrants. No tenderness, rebound, guarding. No organomegaly. PTBD in place, G tube in place.  EXTREMITIES: No peripheral edema. 2+ bilateral pedal pulses.   NEUROLOGIC: Alert and orientated x 3. CN II-XII grossly intact. No focal deficits.   MUSCULOSKELETAL: No joint swelling or tenderness.   SKIN: No jaundice. No rashes or lesions.     LABS:  CBC:  Recent Labs   Lab Test 07/09/24  1438   WBC 11.5*   RBC 3.70*   HGB 9.5*   HCT 30.5*   MCV 82   MCH 25.7*   MCHC 31.1*   RDW 17.5*   *       CMP:  Recent Labs   Lab Test 07/09/24  1438      POTASSIUM 4.2   CHLORIDE 101   CHEY 9.8   CO2 23   BUN 31.4*   CR 0.65   *   AST 34   ALT 50   BILITOTAL 0.2   ALBUMIN 3.6   PROTTOTAL 7.0   ALKPHOS 278*       IMAGING: reviewed CT scan reads from 5/5 and IR imaging from May.     ASSESSMENT & PLAN:    72 yo with benign biliary stricture s/p lap rolanda    NPO  Consented for hepaticojejunstomy with Dr. Rosita Lux MD PhD Transplant Fellow            "

## 2024-07-10 NOTE — ANESTHESIA CARE TRANSFER NOTE
Patient: Rocío Russell    Procedure: Procedure(s):  HEPATICOJEJUNOSTOMY, Tamiko En-Y, Lysis of adhesions       Diagnosis: Injury of bile duct [S36.13XA]  Diagnosis Additional Information: No value filed.    Anesthesia Type:   General     Note:    Oropharynx: oropharynx clear of all foreign objects and spontaneously breathing  Level of Consciousness: drowsy  Oxygen Supplementation: face mask  Level of Supplemental Oxygen (L/min / FiO2): 10  Independent Airway: airway patency satisfactory and stable  Dentition: dentition unchanged  Vital Signs Stable: post-procedure vital signs reviewed and stable  Report to RN Given: handoff report given  Patient transferred to: PACU    Handoff Report: Identifed the Patient, Identified the Reponsible Provider, Reviewed the pertinent medical history, Discussed the surgical course, Reviewed Intra-OP anesthesia mangement and issues during anesthesia, Set expectations for post-procedure period and Allowed opportunity for questions and acknowledgement of understanding      Vitals:  Vitals Value Taken Time    56    Temp 98.3 07/10/24 1538   Pulse 98 07/10/24 1538   Resp 14    SpO2 96 % 07/10/24 1538   Vitals shown include unfiled device data.    Electronically Signed By: Anahy Jay CRNA, CITLALY VIDES  July 10, 2024  3:39 PM

## 2024-07-11 ENCOUNTER — DOCUMENTATION ONLY (OUTPATIENT)
Dept: OTHER | Facility: CLINIC | Age: 74
End: 2024-07-11
Payer: MEDICARE

## 2024-07-11 ENCOUNTER — APPOINTMENT (OUTPATIENT)
Dept: PHYSICAL THERAPY | Facility: CLINIC | Age: 74
DRG: 329 | End: 2024-07-11
Attending: STUDENT IN AN ORGANIZED HEALTH CARE EDUCATION/TRAINING PROGRAM
Payer: MEDICARE

## 2024-07-11 LAB
ALBUMIN SERPL BCG-MCNC: 3 G/DL (ref 3.5–5.2)
ALP SERPL-CCNC: 272 U/L (ref 40–150)
ALT SERPL W P-5'-P-CCNC: 1343 U/L (ref 0–50)
ANION GAP SERPL CALCULATED.3IONS-SCNC: 11 MMOL/L (ref 7–15)
AST SERPL W P-5'-P-CCNC: 2323 U/L (ref 0–45)
BASOPHILS # BLD AUTO: 0.1 10E3/UL (ref 0–0.2)
BASOPHILS NFR BLD AUTO: 0 %
BILIRUB SERPL-MCNC: 0.6 MG/DL
BUN SERPL-MCNC: 29.1 MG/DL (ref 8–23)
CALCIUM SERPL-MCNC: 7.9 MG/DL (ref 8.8–10.2)
CHLORIDE SERPL-SCNC: 102 MMOL/L (ref 98–107)
CREAT SERPL-MCNC: 0.8 MG/DL (ref 0.51–0.95)
DEPRECATED HCO3 PLAS-SCNC: 19 MMOL/L (ref 22–29)
EGFRCR SERPLBLD CKD-EPI 2021: 77 ML/MIN/1.73M2
EOSINOPHIL # BLD AUTO: 0 10E3/UL (ref 0–0.7)
EOSINOPHIL NFR BLD AUTO: 0 %
ERYTHROCYTE [DISTWIDTH] IN BLOOD BY AUTOMATED COUNT: 17.2 % (ref 10–15)
FLUCONAZOLE SUSC ISLT: <0.5 UG/ML (ref 5–20)
GLUCOSE BLDC GLUCOMTR-MCNC: 203 MG/DL (ref 70–99)
GLUCOSE BLDC GLUCOMTR-MCNC: 211 MG/DL (ref 70–99)
GLUCOSE BLDC GLUCOMTR-MCNC: 225 MG/DL (ref 70–99)
GLUCOSE BLDC GLUCOMTR-MCNC: 274 MG/DL (ref 70–99)
GLUCOSE BLDC GLUCOMTR-MCNC: 291 MG/DL (ref 70–99)
GLUCOSE BLDC GLUCOMTR-MCNC: 351 MG/DL (ref 70–99)
GLUCOSE SERPL-MCNC: 347 MG/DL (ref 70–99)
HBV SURFACE AG SERPL QL IA: NONREACTIVE
HCT VFR BLD AUTO: 27.5 % (ref 35–47)
HCV RNA SERPL NAA+PROBE-ACNC: NOT DETECTED IU/ML
HGB BLD-MCNC: 9.1 G/DL (ref 11.7–15.7)
HIV 1+2 AB+HIV1 P24 AG SERPL QL IA: NONREACTIVE
IMM GRANULOCYTES # BLD: 0.2 10E3/UL
IMM GRANULOCYTES NFR BLD: 1 %
INR PPP: 1.39 (ref 0.85–1.15)
LACTATE SERPL-SCNC: 1.2 MMOL/L (ref 0.7–2)
LYMPHOCYTES # BLD AUTO: 1.6 10E3/UL (ref 0.8–5.3)
LYMPHOCYTES NFR BLD AUTO: 7 %
MCH RBC QN AUTO: 26.3 PG (ref 26.5–33)
MCHC RBC AUTO-ENTMCNC: 33.1 G/DL (ref 31.5–36.5)
MCV RBC AUTO: 80 FL (ref 78–100)
MONOCYTES # BLD AUTO: 1 10E3/UL (ref 0–1.3)
MONOCYTES NFR BLD AUTO: 4 %
NEUTROPHILS # BLD AUTO: 19.9 10E3/UL (ref 1.6–8.3)
NEUTROPHILS NFR BLD AUTO: 88 %
NRBC # BLD AUTO: 0 10E3/UL
NRBC BLD AUTO-RTO: 0 /100
PLATELET # BLD AUTO: 345 10E3/UL (ref 150–450)
POTASSIUM SERPL-SCNC: 4.4 MMOL/L (ref 3.4–5.3)
PROT SERPL-MCNC: 5.6 G/DL (ref 6.4–8.3)
RBC # BLD AUTO: 3.46 10E6/UL (ref 3.8–5.2)
SODIUM SERPL-SCNC: 132 MMOL/L (ref 135–145)
WBC # BLD AUTO: 22.8 10E3/UL (ref 4–11)

## 2024-07-11 PROCEDURE — 258N000003 HC RX IP 258 OP 636: Performed by: STUDENT IN AN ORGANIZED HEALTH CARE EDUCATION/TRAINING PROGRAM

## 2024-07-11 PROCEDURE — 36415 COLL VENOUS BLD VENIPUNCTURE: CPT | Performed by: PHYSICIAN ASSISTANT

## 2024-07-11 PROCEDURE — 83605 ASSAY OF LACTIC ACID: CPT | Performed by: PHYSICIAN ASSISTANT

## 2024-07-11 PROCEDURE — 250N000012 HC RX MED GY IP 250 OP 636 PS 637: Performed by: PHYSICIAN ASSISTANT

## 2024-07-11 PROCEDURE — 82040 ASSAY OF SERUM ALBUMIN: CPT

## 2024-07-11 PROCEDURE — 85610 PROTHROMBIN TIME: CPT

## 2024-07-11 PROCEDURE — 97161 PT EVAL LOW COMPLEX 20 MIN: CPT | Mod: GP

## 2024-07-11 PROCEDURE — 82374 ASSAY BLOOD CARBON DIOXIDE: CPT

## 2024-07-11 PROCEDURE — 85025 COMPLETE CBC W/AUTO DIFF WBC: CPT | Performed by: STUDENT IN AN ORGANIZED HEALTH CARE EDUCATION/TRAINING PROGRAM

## 2024-07-11 PROCEDURE — 250N000009 HC RX 250: Performed by: STUDENT IN AN ORGANIZED HEALTH CARE EDUCATION/TRAINING PROGRAM

## 2024-07-11 PROCEDURE — 36415 COLL VENOUS BLD VENIPUNCTURE: CPT

## 2024-07-11 PROCEDURE — 99024 POSTOP FOLLOW-UP VISIT: CPT | Performed by: TRANSPLANT SURGERY

## 2024-07-11 PROCEDURE — 999N000248 HC STATISTIC IV INSERT WITH US BY RN

## 2024-07-11 PROCEDURE — 97530 THERAPEUTIC ACTIVITIES: CPT | Mod: GP

## 2024-07-11 PROCEDURE — 250N000011 HC RX IP 250 OP 636: Performed by: STUDENT IN AN ORGANIZED HEALTH CARE EDUCATION/TRAINING PROGRAM

## 2024-07-11 PROCEDURE — 250N000011 HC RX IP 250 OP 636: Performed by: TRANSPLANT SURGERY

## 2024-07-11 PROCEDURE — 250N000011 HC RX IP 250 OP 636: Performed by: PHYSICIAN ASSISTANT

## 2024-07-11 PROCEDURE — 120N000003 HC R&B IMCU UMMC

## 2024-07-11 RX ORDER — DEXTROSE MONOHYDRATE 25 G/50ML
25-50 INJECTION, SOLUTION INTRAVENOUS
Status: DISCONTINUED | OUTPATIENT
Start: 2024-07-11 | End: 2024-07-18 | Stop reason: HOSPADM

## 2024-07-11 RX ORDER — IMIPENEM AND CILASTATIN 500; 500 MG/1; MG/1
500 INJECTION, POWDER, FOR SOLUTION INTRAVENOUS EVERY 6 HOURS
Status: DISCONTINUED | OUTPATIENT
Start: 2024-07-11 | End: 2024-07-12

## 2024-07-11 RX ORDER — CALCIUM CARBONATE 500 MG/1
1 TABLET, CHEWABLE ORAL 3 TIMES DAILY PRN
COMMUNITY

## 2024-07-11 RX ORDER — GABAPENTIN 600 MG/1
600 TABLET ORAL 2 TIMES DAILY
Status: DISCONTINUED | OUTPATIENT
Start: 2024-07-11 | End: 2024-07-11

## 2024-07-11 RX ORDER — DULOXETIN HYDROCHLORIDE 60 MG/1
60 CAPSULE, DELAYED RELEASE ORAL EVERY MORNING
Status: DISCONTINUED | OUTPATIENT
Start: 2024-07-12 | End: 2024-07-11

## 2024-07-11 RX ORDER — AMITRIPTYLINE HCL 25 MG
10 TABLET ORAL AT BEDTIME
Status: DISCONTINUED | OUTPATIENT
Start: 2024-07-11 | End: 2024-07-12

## 2024-07-11 RX ORDER — METFORMIN HCL 500 MG
1000 TABLET, EXTENDED RELEASE 24 HR ORAL 2 TIMES DAILY WITH MEALS
COMMUNITY

## 2024-07-11 RX ORDER — AMITRIPTYLINE HYDROCHLORIDE 10 MG/1
10 TABLET ORAL AT BEDTIME
Status: DISCONTINUED | OUTPATIENT
Start: 2024-07-11 | End: 2024-07-11

## 2024-07-11 RX ORDER — OXYCODONE HYDROCHLORIDE 5 MG/1
5 TABLET ORAL DAILY PRN
Status: ON HOLD | COMMUNITY
End: 2024-07-18

## 2024-07-11 RX ORDER — GABAPENTIN 250 MG/5ML
600 SOLUTION ORAL EVERY 12 HOURS SCHEDULED
Status: DISCONTINUED | OUTPATIENT
Start: 2024-07-11 | End: 2024-07-12

## 2024-07-11 RX ORDER — IBUPROFEN 200 MG
200-400 TABLET ORAL EVERY 6 HOURS PRN
COMMUNITY

## 2024-07-11 RX ORDER — NICOTINE POLACRILEX 4 MG
15-30 LOZENGE BUCCAL
Status: DISCONTINUED | OUTPATIENT
Start: 2024-07-11 | End: 2024-07-18 | Stop reason: HOSPADM

## 2024-07-11 RX ORDER — HEPARIN SODIUM 5000 [USP'U]/.5ML
5000 INJECTION, SOLUTION INTRAVENOUS; SUBCUTANEOUS EVERY 8 HOURS
Status: DISCONTINUED | OUTPATIENT
Start: 2024-07-11 | End: 2024-07-18 | Stop reason: HOSPADM

## 2024-07-11 RX ORDER — SIMVASTATIN 40 MG
40 TABLET ORAL AT BEDTIME
Status: DISCONTINUED | OUTPATIENT
Start: 2024-07-11 | End: 2024-07-18 | Stop reason: HOSPADM

## 2024-07-11 RX ADMIN — HYDROMORPHONE HYDROCHLORIDE 0.2 MG: 0.2 INJECTION, SOLUTION INTRAMUSCULAR; INTRAVENOUS; SUBCUTANEOUS at 20:41

## 2024-07-11 RX ADMIN — METOPROLOL TARTRATE 5 MG: 5 INJECTION INTRAVENOUS at 08:56

## 2024-07-11 RX ADMIN — FLUCONAZOLE IN SODIUM CHLORIDE 200 MG: 2 INJECTION, SOLUTION INTRAVENOUS at 16:41

## 2024-07-11 RX ADMIN — INSULIN ASPART 2 UNITS: 100 INJECTION, SOLUTION INTRAVENOUS; SUBCUTANEOUS at 23:15

## 2024-07-11 RX ADMIN — FAMOTIDINE 20 MG: 10 INJECTION, SOLUTION INTRAVENOUS at 01:20

## 2024-07-11 RX ADMIN — METOPROLOL TARTRATE 5 MG: 5 INJECTION INTRAVENOUS at 14:06

## 2024-07-11 RX ADMIN — HYDROMORPHONE HYDROCHLORIDE 0.4 MG: 0.2 INJECTION, SOLUTION INTRAMUSCULAR; INTRAVENOUS; SUBCUTANEOUS at 12:03

## 2024-07-11 RX ADMIN — METOPROLOL TARTRATE 5 MG: 5 INJECTION INTRAVENOUS at 20:46

## 2024-07-11 RX ADMIN — POTASSIUM CHLORIDE, DEXTROSE MONOHYDRATE AND SODIUM CHLORIDE: 150; 5; 450 INJECTION, SOLUTION INTRAVENOUS at 08:20

## 2024-07-11 RX ADMIN — PIPERACILLIN SODIUM AND TAZOBACTAM SODIUM 3.38 G: 3; .375 INJECTION, POWDER, LYOPHILIZED, FOR SOLUTION INTRAVENOUS at 12:02

## 2024-07-11 RX ADMIN — HYDROMORPHONE HYDROCHLORIDE 0.4 MG: 0.2 INJECTION, SOLUTION INTRAMUSCULAR; INTRAVENOUS; SUBCUTANEOUS at 18:15

## 2024-07-11 RX ADMIN — VANCOMYCIN HYDROCHLORIDE 1000 MG: 1 INJECTION, SOLUTION INTRAVENOUS at 01:21

## 2024-07-11 RX ADMIN — PIPERACILLIN SODIUM AND TAZOBACTAM SODIUM 3.38 G: 3; .375 INJECTION, POWDER, LYOPHILIZED, FOR SOLUTION INTRAVENOUS at 05:20

## 2024-07-11 RX ADMIN — HYDROMORPHONE HYDROCHLORIDE 0.2 MG: 0.2 INJECTION, SOLUTION INTRAMUSCULAR; INTRAVENOUS; SUBCUTANEOUS at 22:52

## 2024-07-11 RX ADMIN — IMIPENEM AND CILASTATIN SODIUM 500 MG OF IMIPENEM: 500; 500 INJECTION, POWDER, FOR SOLUTION INTRAVENOUS at 14:15

## 2024-07-11 RX ADMIN — HYDROMORPHONE HYDROCHLORIDE 0.2 MG: 0.2 INJECTION, SOLUTION INTRAMUSCULAR; INTRAVENOUS; SUBCUTANEOUS at 16:41

## 2024-07-11 RX ADMIN — FAMOTIDINE 20 MG: 10 INJECTION, SOLUTION INTRAVENOUS at 08:55

## 2024-07-11 RX ADMIN — HEPARIN SODIUM 5000 UNITS: 5000 INJECTION, SOLUTION INTRAVENOUS; SUBCUTANEOUS at 15:43

## 2024-07-11 RX ADMIN — INSULIN ASPART 2 UNITS: 100 INJECTION, SOLUTION INTRAVENOUS; SUBCUTANEOUS at 17:57

## 2024-07-11 RX ADMIN — HYDROMORPHONE HYDROCHLORIDE 0.4 MG: 0.2 INJECTION, SOLUTION INTRAMUSCULAR; INTRAVENOUS; SUBCUTANEOUS at 05:21

## 2024-07-11 RX ADMIN — INSULIN ASPART 5 UNITS: 100 INJECTION, SOLUTION INTRAVENOUS; SUBCUTANEOUS at 09:18

## 2024-07-11 RX ADMIN — IMIPENEM AND CILASTATIN SODIUM 500 MG OF IMIPENEM: 500; 500 INJECTION, POWDER, FOR SOLUTION INTRAVENOUS at 20:47

## 2024-07-11 RX ADMIN — HYDROMORPHONE HYDROCHLORIDE 0.2 MG: 0.2 INJECTION, SOLUTION INTRAMUSCULAR; INTRAVENOUS; SUBCUTANEOUS at 14:05

## 2024-07-11 RX ADMIN — INSULIN ASPART 3 UNITS: 100 INJECTION, SOLUTION INTRAVENOUS; SUBCUTANEOUS at 12:10

## 2024-07-11 RX ADMIN — HYDROMORPHONE HYDROCHLORIDE 0.4 MG: 0.2 INJECTION, SOLUTION INTRAMUSCULAR; INTRAVENOUS; SUBCUTANEOUS at 09:06

## 2024-07-11 RX ADMIN — PIPERACILLIN SODIUM AND TAZOBACTAM SODIUM 3.38 G: 3; .375 INJECTION, POWDER, LYOPHILIZED, FOR SOLUTION INTRAVENOUS at 00:37

## 2024-07-11 RX ADMIN — HYDROMORPHONE HYDROCHLORIDE 0.4 MG: 0.2 INJECTION, SOLUTION INTRAMUSCULAR; INTRAVENOUS; SUBCUTANEOUS at 00:23

## 2024-07-11 ASSESSMENT — ACTIVITIES OF DAILY LIVING (ADL)
ADLS_ACUITY_SCORE: 37
ADLS_ACUITY_SCORE: 43
ADLS_ACUITY_SCORE: 42
ADLS_ACUITY_SCORE: 43
ADLS_ACUITY_SCORE: 43
ADLS_ACUITY_SCORE: 37
ADLS_ACUITY_SCORE: 43
ADLS_ACUITY_SCORE: 43
ADLS_ACUITY_SCORE: 42
ADLS_ACUITY_SCORE: 43
DEPENDENT_IADLS:: CLEANING;COOKING;LAUNDRY;SHOPPING;MEAL PREPARATION;MEDICATION MANAGEMENT;TRANSPORTATION
ADLS_ACUITY_SCORE: 42
ADLS_ACUITY_SCORE: 42
ADLS_ACUITY_SCORE: 43

## 2024-07-11 NOTE — PHARMACY-ADMISSION MEDICATION HISTORY
Pharmacy Intern Admission Medication History    Admission medication history is complete. The information provided in this note is only as accurate as the sources available at the time of the update.    Information Source(s): Patient, Family member, and [facility] via in-person    Pertinent Information:   Patient and daughter (Precious) both seem to be excellent historians - they have a home health nurse set up the patient's pillbox each week and then Precious helps Kimberlyn take her meds every day  Takes Oxycodone and Furosemide as needed, but this ends up being almost every day though she tries not to  Between the Oxycodone and Iron supplement, the patient was struggling with constipation, so has been taking senna-docusate TID which is helping  [facility] [employer] fills because they used to get some of Kimberlyn's meds through [facility] (through [employer]) which does not pull into [employer]; has changed insurance as of 24 and anticipates filling everything through the [facility] in [place]  Off [facility] since ~2024    Changes made to PTA medication list:  Added: Oxycodone, Ibuprofen, Tums, Systane eye drops  Deleted: Percocet, Simethicone  Changed:   Metformin osmotic 1000 m mg BID > metformin  mg tabs: 1000 mg BID per patient, daughter, and [facility] records  Senna-docusate daily > TID per patient and daughter    Allergies reviewed with patient and updates made in EHR: yes    Medication History Completed By: Froilan Ayoub 2024 10:35 AM    PTA Med List   Medication Sig Last Dose    acetaminophen (TYLENOL) 325 MG tablet Take 650 mg by mouth every 4 hours as needed Past Week at PRN    amitriptyline (ELAVIL) 10 MG tablet Take 10 mg by mouth at bedtime 2024 at PM    aspirin 81 MG EC tablet Take 81 mg by mouth daily 2024 at AM    calcium carbonate (TUMS) 500 MG chewable tablet Take 1 chew tab by mouth 3 times daily as needed Past Week at PRN     CALCIUM PO Take 600 mg by mouth every morning 7/9/2024 at 0700    COLLAGEN PO Take by mouth 2 times daily 7/8/2024 at PM    DULOXETINE HCL PO Take 60 mg by mouth every morning 7/10/2024 at 0330    fenofibrate (TRIGLIDE/LOFIBRA) 160 MG tablet Take 160 mg by mouth every morning 7/9/2024 at 0700    ferrous sulfate (FEROSUL) 325 (65 Fe) MG tablet Take 325 mg by mouth daily (with breakfast) 7/9/2024 at 0900    gabapentin (NEURONTIN) 600 MG tablet Take 600 mg by mouth 2 times daily 7/10/2024 at 0330    glimepiride (AMARYL) 1 MG tablet Take 1 mg by mouth every morning (before breakfast) 7/9/2024 at 0700    Glucosamine Sulfate 500 MG TABS Take 1,000 mg by mouth every morning 7/9/2024 at 0700    ibuprofen (ADVIL/MOTRIN) 200 MG tablet Take 200-400 mg by mouth every 6 hours as needed for pain Past Week at PRN    lisinopril (ZESTRIL) 40 MG tablet Take 40 mg by mouth every morning 7/9/2024 at 0700    loratadine (CLARITIN) 10 MG tablet Take 10 mg by mouth every morning 7/9/2024 at 0700    magnesium gluconate (MAGONATE) 500 MG tablet Take 250 mg by mouth every morning 7/9/2024 at PM    metFORMIN (GLUCOPHAGE XR) 500 MG 24 hr tablet Take 1,000 mg by mouth 2 times daily (with meals) 7/9/2024 at PM    Multiple Vitamins-Minerals (PRESERVISION/LUTEIN) CAPS Take 1 capsule by mouth every morning 7/9/2024 at PM    oxyCODONE (ROXICODONE) 5 MG tablet Take 5 mg by mouth daily as needed for severe pain 7/10/2024 at 0330    polyethylene glycol-propylene glycol (SYSTANE ULTRA) 0.4-0.3 % SOLN ophthalmic solution Place 1 drop into both eyes 2 times daily as needed for dry eyes 7/10/2024 at AM    senna-docusate (SENOKOT-S/PERICOLACE) 8.6-50 MG tablet Take 1 tablet by mouth 3 times daily (with meals) 7/9/2024 at PM    simvastatin (ZOCOR) 40 MG tablet Take 40 mg by mouth at bedtime 7/9/2024 at PM    Vitamin D, Cholecalciferol, 10 MCG (400 UNIT) TABS Take 400 Units by mouth every other day 7/8/2024 at AM

## 2024-07-11 NOTE — OP NOTE
Transplant Surgery  Operative Note    Preop Dx: #1.  Post cholecystectomy benign biliary stricture #2 status post PTC  Postop Dx: #1.  Postcholecystectomy biliary stricture type IV (roof of the confluence intact, but both right and left ducts was separate, no common duct stump noted)  Procedure:   #1.  Tamiko-en-Y hepaticojejunostomy  #2.  Adhesional lysis greater than 120 minutes  #3.  Intraoperative cholangiogram under fluoroscopy  Surgeon: Alejandro Becker M.D.  ASSISTANT: Dr. Connor Howard MD fellow. There was no qualified general surgery resident available to assist during this procedure.   Anesthesia: General  EBL: Less than 150 mL ml  Fluids: Please see anesthesiology chart  UO: Greater than 500 mL  Drains: Kwame-Gates drain, 1 on the right side posterior to the bile duct and 1 on the left side posterior to the bile duct  Specimen: Bile for culture which was sent for Gram staining showed gram-positive cocci and gram-negative rods culture was also sent.  Complications: None  Findings:    #1.  Very dense adhesions in the upper abdomen.  The hilum of the liver was frozen  #2.  We found the bile duct above the bifurcation of the portal vein.  The right and left bile ducts appeared separate.  There was a sliver of confluence superiorly present.  The right duct immediately divided into right posterior and right anterior.  There were 3 of 4 clips in the region of the common bile duct.  #3.  The left hepatic artery looked very prominent.  The right hepatic artery was diminutive.  The portal vein appeared patent.  #4.  The liver looked nice and soft  #5.  Completion cholangiogram showed no leakage from the anastomosis and there was free-flowing into the small bowel.  Complications: None.    Indication: The patient has type IV postcholecystectomy biliary stricture after discussing the risks and benefits of surgery and potential complications, the patient provided informed consent.     DETAILS OF PROCEDURE:  The  patient was brought to the operating room, placed in a supine position.  Perioperative prophylactic IV antibiotics were given.  Anesthesia was adminisitered. The abdomen was prepped and draped in the usual sterile fashion.  Time out was performed.  We opened the abdomen by midline incision.  There were significant adhesions in the abdomen and we spent about greater than 2 hours during adhesional lysis.  We placed a Small retractors.  It was quite deep we placed 2 laps behind the liver to bring it up.  We did meticulous hilar dissection identified the left hepatic artery we then looped the left hepatic artery.  We identified the right hepatic artery which is going posteriorly.  We identified the left portal vein.  Superior to the left portal vein be solid structure which looked like a bile duct.  We punctured with with a small needle and got bile.  Using the needle as a guide we made a incision of the bile duct.  We enlarge the incision to about 1.5 cm transversely.  We then injected methylene blue stained solution through the external biliary drain.  We were able to identify the right duct.  We trimmed the right duct.  There were purulent material in the bile ducts which was sent for Gram stain and culture.  There were 3 4 staples occluding the right duct which were removed.  We then constructed a Tamiko-en-Y limb.  We divided the small bowel about 30 cm from the DJ.  We constructed a 60 cm Tamiko limb.  The side-to-side jejunostomy was done in 2 layers inner PDS and outer Prolene.  The Tamiko limb was routed retrocolic to the hepatic hilum.  The mesenteric defect in the small bowel was closed with interrupted 3-0 silk.  We then performed a interrupted anastomosis of the posterior liver using 5-0 PDS.  We then pushed the biliary stent through the anastomosis into the bowel loop.  We then performed the anterior layer using interrupted 5-0 Prolene.  We performed a cholangiogram by injecting dye through the external  biliary drain.  There was no leak at the anastomosis.  We mobilized the omentum and placed it anterior lay on the anastomosis.  We placed 219 [place] [address] drains one behind the Tamiko limb on the right side one behind the Tamiko limb on the left side.  We thoroughly irrigated the abdominal cavity with close to 5 L of warm saline.  We did a pencil Doppler to make sure the portal vein and both hepatic arteries were patent.    The abdomen was closed with oh looped PDS in a single layer.  3-0 Vicryl was placed for the subcutaneous tissue and skin was approximated with staples.    All needle, sponge, and instrument counts were accurate.  The patient tolerated the procedure well without apparent complications and was trasfered to the PACU in good condition.  Faculty was present for entire procedure.  I spoke to the patient's daughter and explained the above clinical details and all questions were answered.

## 2024-07-11 NOTE — PLAN OF CARE
Goal Outcome Evaluation:    Plan of Care Reviewed With: patient, family    Overall Patient Progress: no change    Outcome Evaluation: Pt's goal is to discharge home where she resides with her dtrPrecious

## 2024-07-11 NOTE — PROGRESS NOTES
07/11/24 1224   Appointment Info   Signing Clinician's Name / Credentials (PT) Mars Ramey DPT   Rehab Comments (PT) abdominal prec, L cam boot d/t recent metatarsal fractures, significant RLE sciatica into foot at baseline   Living Environment   People in Home child(yifan), adult  (daughter)   Current Living Arrangements house   Home Accessibility stairs to enter home   Number of Stairs, Main Entrance 7   Stair Railings, Main Entrance railings on both sides of stairs  (too far apart to use simultaneously)   Transportation Anticipated family or friend will provide   Living Environment Comments Pt lives in single level home with 7 JEANNE, 6 of which have a single railing to utilize. Tub shower on main level   Self-Care   Usual Activity Tolerance moderate   Current Activity Tolerance poor   Regular Exercise No   Equipment Currently Used at Home grab bar, toilet;grab bar, tub/shower;shower chair;walker, rolling;walker, standard;wheelchair, manual;other (see comments);commode chair  (grabber)   Fall history within last six months yes   Number of times patient has fallen within last six months 2   Activity/Exercise/Self-Care Comment Pt previously receiving supervision for all upright mobility from daughter who lives in home and works remotely. Utilizing FWW vs. 4WW for short distances. Per pt report uses manual wheelchair when needed in home. Sidesteps with BUE support on raillings at home at baseline with increased difficulty   General Information   Onset of Illness/Injury or Date of Surgery 07/10/24   Referring Physician Connor Lux MD   Patient/Family Therapy Goals Statement (PT) To go home with daughter and be in less pain   Pertinent History of Current Problem (include personal factors and/or comorbidities that impact the POC) Pt is POD#1 s/p  HEPATICOJEJUNOSTOMY, Tamiko En-Y, Lysis of adhesions   Existing Precautions/Restrictions fall;abdominal   Cognition   Affect/Mental Status (Cognition) WFL   Orientation  Status (Cognition) oriented x 4   Follows Commands (Cognition) WFL   Pain Assessment   Patient Currently in Pain Yes, see Vital Sign flowsheet   Integumentary/Edema   Integumentary/Edema other (describe)   Integumentary/Edema Comments Pt with old L knee abrasion   Posture    Posture Forward head position;Protracted shoulders;Kyphosis   Range of Motion (ROM)   ROM Comment BLE WFL with exception of limited DF bilaterally (baseline)   Strength (Manual Muscle Testing)   Strength (Manual Muscle Testing) Deficits observed during functional mobility   Strength Comments Generalized weakness, strength assessment limited by pain   Bed Mobility   Bed Mobility supine-sit   Comment, (Bed Mobility) sup>sit with Talisha   Transfers   Comment, (Transfers) STS and bed>chair with min from elevated bed surface   Gait/Stairs (Locomotion)   Comment, (Gait/Stairs) Amb x 4' with FWW and CGA   Balance   Balance other (describe)   Sitting Balance: Static good balance   Sitting Balance: Dynamic good balance   Standing Balance: Static fair balance   Standing Balance: Dynamic fair balance   Balance Quick Add Sitting balance: Static;Sitting balance: dynamic;Standing balance: static;Standing balance: dynamic   Sensory Examination   Sensory Perception Comments baseline neuropathy in BLEs, sciatica in RLE   Coordination   Coordination no deficits were identified   Muscle Tone   Muscle Tone no deficits were identified   Clinical Impression   Criteria for Skilled Therapeutic Intervention Yes, treatment indicated   PT Diagnosis (PT) Impaired functional mobility   Influenced by the following impairments Generalized weakness, new abdominal prec, pain   Functional limitations due to impairments Decreased IND with bed mobility, transfers, gait, stairs   Clinical Presentation (PT Evaluation Complexity) stable   Clinical Presentation Rationale Clinical judgment   Clinical Decision Making (Complexity) low complexity   Planned Therapy Interventions (PT) balance  training;bed mobility training;gait training;home exercise program;patient/family education;stair training;strengthening;transfer training;progressive activity/exercise   Risk & Benefits of therapy have been explained evaluation/treatment results reviewed;care plan/treatment goals reviewed;risks/benefits reviewed;patient   PT Total Evaluation Time   PT Eval, Low Complexity Minutes (79476) 9   Physical Therapy Goals   PT Frequency Daily   PT Predicted Duration/Target Date for Goal Attainment 08/16/24   PT Goals Bed Mobility;Transfers;Gait;Stairs   PT: Bed Mobility Supervision/stand-by assist;Supine to/from sit;Within precautions   PT: Transfers Supervision/stand-by assist;Sit to/from stand;Bed to/from chair;Assistive device;Within precautions   PT: Gait Supervision/stand-by assist;50 feet;Assistive device   PT: Stairs 7 stairs;Rail on right;Supervision/stand-by assist   PT Discharge Planning   PT Plan Review prec, bed mobility, progress gait with chair follow   PT Discharge Recommendation (DC Rec) Transitional Care Facility;home with assist;home with home care physical therapy   PT Rationale for DC Rec Pt currently below functional baseline, limited by pain and reduced activity tolerance. Pt and daughter with strong preference to return to home at discharge, daughter able to provide 24/7 assist. Anticipate good progress during LOS to assure safety with home discharge with assist from family.   PT Brief overview of current status A x 1 pivot with FWW   Total Session Time   Timed Code Treatment Minutes 19   Total Session Time (sum of timed and untimed services) 28

## 2024-07-11 NOTE — OR NURSING
"Pt was received from the OR at 15:40. Noted she has multiple drains,all intact.   Noted SBP in the 180's, was notified,order was received to give the floor Metoprolol order and in addition to that,give 20 mg of Labetalol.  Dr. Becker also came to the bedside at 17:10,he was made aware of the BP,he gave order to keep SBP at 130's.   BP still high and writer needed orders clarified,general surgery resident,Dr. Bledsoe was updated. Aware of lab results and finger stick. Unable to treat blood sugar according to the insulin sliding scale,due to insulin pen availability from Pharmacy. Dr. Bledsoe aware,states to hold off insulin gtt and the insulin pen treatment if not available. Insulin gtt could be started on the floor when pt transfers\".  Dr. Bledsoe went to discuss pt care with surgery team for more order clarification.  Pharmacy staff brought insulin pen to bedside,finger stick was 192,0.5 unit was given at 19:39.   Charge is in communication with the surgery team,trying to clarify as to whether pt is going to ICU or to another floor.  Pt denied pain and nausea since she came to PACU.  Staff will continue to monitor.  "

## 2024-07-11 NOTE — PHARMACY-VANCOMYCIN DOSING SERVICE
"Pharmacy Vancomycin Initial Note  Date of Service 2024  Patient's  1950  73 year old, female    Indication: Surgical Prophylaxis    Current estimated CrCl = Estimated Creatinine Clearance: 60 mL/min (based on SCr of 0.8 mg/dL).    Creatinine for last 3 days  2024:  2:38 PM Creatinine 0.65 mg/dL  7/10/2024:  4:24 PM Creatinine 0.57 mg/dL  2024:  5:46 AM Creatinine 0.80 mg/dL    Recent Vancomycin Level(s) for last 3 days  No results found for requested labs within last 3 days.      Vancomycin IV Administrations (past 72 hours)                     vancomycin (VANCOCIN) 1,000 mg in 200 mL dextrose intermittent infusion (mg) 1,000 mg New Bag 24 0121    vancomycin (VANCOCIN) 1,000 mg in 200 mL dextrose intermittent infusion (mg) 1,000 mg New Bag 07/10/24 0705                    Nephrotoxins and other renal medications (From now, onward)      Start     Dose/Rate Route Frequency Ordered Stop    24 0100  vancomycin (VANCOCIN) 1,500 mg in 0.9% NaCl 250 mL intermittent infusion         1,500 mg  over 90 Minutes Intravenous EVERY 24 HOURS 24 1307      07/10/24 2300  piperacillin-tazobactam (ZOSYN) 3.375 g vial to attach to  mL bag        Note to Pharmacy: For [facility], [facility] and [facility]: For Zosyn-naive patients, use the \"Zosyn initial dose + extended infusion\" order panel.    3.375 g  over 30 Minutes Intravenous EVERY 6 HOURS 07/10/24 2130 24 1759            Contrast Orders - past 72 hours (72h ago, onward)      Start     Dose/Rate Route Frequency Stop    07/10/24 1506  iopamidol (ISOVUE-250) solution  Status:  Discontinued           PRN 07/10/24 1535            [employer]ight[employer] Prediction of Planned Initial Vancomycin Regimen    Regimen: 1500 mg IV every 24 hours.  Start time: 19:21 on 2024  Exposure target: AUC24 (range)400-600 mg/L.hr   AUC24,ss: 567 mg/L.hr  Probability of AUC24 > 400: 85 %  Ctrough,ss: 15.8 mg/L  Probability of Ctrough,ss > 20: 31 %  Probability of " nephrotoxicity (Lodise NATHALY 2009): 11 %          Plan:  Start vancomycin  1500 mg IV q24h from earlier dose today at 0100   Vancomycin monitoring method: AUC  Vancomycin therapeutic monitoring goal: 400-600 mg*h/L  Pharmacy will check vancomycin levels as appropriate in 1-3 Days.    Serum creatinine levels will be ordered daily for the first week of therapy and at least twice weekly for subsequent weeks.      Bruno Peralta [facility]

## 2024-07-11 NOTE — PROGRESS NOTES
" Admission          7/10/2024 23:00 PM  -----------------------------------------------------------  Diagnosis: Stricture of CBD    /58   Pulse 94   Temp 98.3  F (36.8  C) (Oral)   Resp 17   Ht 1.613 m (5' 3.5\")   Wt 60.8 kg (134 lb 0.6 oz)   SpO2 93%   BMI 23.37 kg/m         Admitted from: PACU  Report given from: Mckenzie  Via: Stretcher  Accompanied by: transport.  Family Aware of Admission: Daughter at bedside  Belongings: with Patient  Admission Profile: Complete  Teaching:  Access: 2 PIV  Telemetry: Placed on pt  Ht./Wt.: Complete  2 RN skin assessment: Alba ESTRADA RN, and Vicky VICTOR RN  "

## 2024-07-11 NOTE — PROGRESS NOTES
Patient was seen in person at 5.20 pm was stable  And lab values reviewed at 9.30 pm  DUS shows patent vessels to liver  AST>3,000, repeat lactic acid was 1.3  Hemodynamically stable and making good urine    Recommend   Blood cultures for fever of 100.4  Repeat ABG  Continue antibiotics and iv fluids

## 2024-07-11 NOTE — PROGRESS NOTES
D: Biliary stricture and hepatojejunostomy (7/10)      PMHx: 73 year old female who developed a stricture of the CBD s/p Cholecystectomy. She underwent a thorough workup including dobutamine stress test which revelaled low risk and no ischemia. PTBD was last exchanged 5/10. Currently refeeding up to a liter of bile     I: Monitored vitals and assessed pt status. Encouraged activity.      Changed:   IV Access: 2 x R PIVs   Running: IV KCL 75 ml/hr  PRN: IV Dilaudid    Tele: SR  O2: RA  Mobility: assist x 2  Skin: Mid abdominal incision dressing CDI. 2 x MILLI drain sites, and 2 x other tube sites     A: A&Ox4. VSS. Afebrile. Pt has NG tube with low intermittent suction and NPO except ice chips. Mancera catheter is in place. Pt is on bedrest with 20 degree head elevated. Pt is on BG check 4 times a day and insulin sliding scale. Morning 6AM [id number]. Paged the provider per order.      P: Continue to monitor pt status and report changes to treatment team.

## 2024-07-11 NOTE — PROGRESS NOTES
Care Management Follow Up    Length of Stay (days): 1    Expected Discharge Date: 07/12/2024     Concerns to be Addressed: discharge planning-healthcare directive  Patient plan of care discussed at interdisciplinary rounds: Yes    Anticipated Discharge Disposition: Home     Anticipated Discharge Services: Home Care  Anticipated Discharge DME: Wheelchair, Walker    Patient/family educated on Medicare website which has current facility and service quality ratings: no  Education Provided on the Discharge Plan: Yes  Patient/Family in Agreement with the Plan: yes    Referrals Placed by CM/SW:  n/a  Private pay costs discussed: Not applicable    Additional Information:    CHW delegated to notarize patient's [employer] Directive. CHW emailed forms to [employer] and provided patient with copies of their form, along with their original. CHW to follow up if needed.       Angela Jalloh  5A/5C Community Health Worker  [place], [place]  [email or web address]  [phone]

## 2024-07-11 NOTE — CONSULTS
Care Management Initial Consult    General Information  Assessment completed with: Patient, Family (Precious - dtr)  Type of CM/SW Visit: Initial Assessment  Primary Care Provider verified and updated as needed: Yes - Niki Hoyos  Readmission within the last 30 days: no previous admission in last 30 days   Reason for Consult: discharge planning  Advance Care Planning: Advance Care Planning Reviewed: other (see comments) (Pt has a health care directive that she typed up in advance of coming here. It is not signed and not notarized. SW requested for our department notary to see pt today.)        Communication Assessment  Patient's communication style: spoken language (English)    Hearing Difficulty or Deaf: no   Wear Glasses or Blind: yes    Cognitive  Cognitive/Neuro/Behavioral: WNL                  Living Environment:   People in home: Precious  Current living Arrangements: house      Able to return to prior arrangements: yes     Family/Social Support:  Care provided by: self, dtr  Provides care for: no one  Marital Status: Single   Description of Support System: Supportive, Involved - Pt has four children. Dtr Precious is most involved  Support Assessment: Adequate family and caregiver support    Current Resources:   Patient receiving home care services: Yes, through [facility]. 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  No    Lifestyle & Psychosocial Needs:  Social Determinants of Health     Food Insecurity: No Food Insecurity (5/6/2024)    Received from [facility]    Hunger Vital Sign     Worried About Running Out of Food in the Last Year: Never true     Ran Out of Food in the Last Year: Never true   Depression: Not at risk (7/9/2024)    PHQ-2     PHQ-2 Score: 2   Housing Stability: Low Risk  (5/6/2024)    Received from [facility]    Housing Stability Vital Sign     Unable to Pay for Housing in the Last Year: No     Number of Places Lived in the Last Year: 1     Unstable Housing in the Last Year: No   Tobacco Use: High Risk (7/9/2024)    Patient History     Smoking Tobacco Use: Every Day     Smokeless Tobacco Use: Never     Passive Exposure: Current   Financial Resource Strain: Low Risk  (5/6/2024)    Received from [facility]    Overall Financial Resource Strain (CARDIA)     Difficulty of Paying Living Expenses: Not hard at all   Alcohol Use: Not At Risk (3/28/2024)    Received from [facility]    AUDIT-C     Frequency of Alcohol Consumption: Never     Average Number of Drinks: Patient does not drink     Frequency of Binge Drinking: Never   Transportation Needs: No Transportation Needs (5/6/2024)    Received from [facility]    PRAPARE - Transportation     Lack of Transportation (Medical): No     Lack of Transportation (Non-Medical): No   Physical Activity: Inactive (11/30/2022)    Received from [facility], [facility]    Exercise Vital Sign     Days of Exercise per Week: 0 days     Minutes of Exercise per Session: 0 min   Interpersonal Safety: Not At Risk (1/26/2024)    Received from  and [facility]     IP Custom IPV      Do you feel UNSAFE in any of your personal relationships with your family members or any other acquaintances?: No   Stress: No Stress Concern Present (11/30/2022)    Received from [facility], [facility]    [employer] - Occupational Stress Questionnaire     Feeling of Stress : Not at all   Social Connections: Moderately Isolated (11/30/2022)    Received from [facility], [facility]    Social Connection and Isolation Panel [NHANES]     Frequency of Communication with Friends and Family: Twice a week     Frequency of Social Gatherings with Friends and Family: More than three times a week     Attends Holiness Services: 1 to 4 times per year     Active Member of Clubs or Organizations: No     Attends Club or Organization Meetings: Never     Marital Status:    Health Literacy: Not on file     Functional Status:  Prior to admission patient needed assistance:   Dependent ADLs:: Ambulation-walker, Transfers, Wheelchair-with assist  Dependent IADLs:: Cleaning, Cooking, Laundry, Shopping, Meal Preparation, Medication Management, Transportation     Mental Health Status:  Mental Health Status: No Current Concerns       Chemical Dependency Status:  Chemical Dependency Status: No Current Concerns           Values/Beliefs:  Spiritual, Cultural Beliefs, Holiness Practices, Values that affect care: n/a     Cultural/Holiness Practices Patient Routinely Participates In: [employer] beliefs    Additional Information:  SW met with pt and her dtr, Precious, at bedside to complete initial assessment per consult order. Pt reports that there are seven stairs to enter her home, and there isn't a ramp currently - but they may ask for that as pt is on a Medical Assistance waiver program to receive services in the community. Pt reports she has an RV on her property that she likes to spend time in. Pt was open with  [facility] for home care prior to arrival.    SW informed department Angela dean, that pt's health care directive is done but needs notary stamp. Angela will plan to see Kimberlyn today.    Care management will continue to follow to support with discharge planning.    Cristina Hernandez JAMES  [facility]   Covering for Stephen Normanud - Phone: [phone]

## 2024-07-11 NOTE — PROGRESS NOTES
Transplant Surgery  Inpatient Daily Progress Note  07/11/2024    Patient seen and examined agree with below mentioned findings.  The liver tests slowly coming down.  Patient looks well.  Both drains look nonbilious    Multiple organisms growing through the bile and white count is elevated.  Would recommend broadening antibiotic coverage and containing antifungals.  Keep n.p.o. for now.    I met with the patient's family and explained the below clinical details and answered all questions.      Assessment & Plan: 73 year old female who developed a stricture of the CBD s/p Cholecystectomy. Patient now s/p Hepaticojejunostomy, Melinda En Y, and ARABELLA on 7/10/24. Surgeon was Dr. Becker.     Cardiorespiratory: Stable   HTN: PTA Lisinopril, Lasix. SBP 97-120s. Hold today  HLD: PTA fenofibrate  Hematology: Hb stable  GI/Nutrition: NPO. NGT to LIS. Minimal NG tube output. Follow up with surgeon.  Fluid/Electrolytes: MIVF:D5 1/2 NS + KCL 20mEq @ 75 ml/hr. Replete electrolytes PRN  Endocrine:   DM2: PTA metformin and glimepiride. Hold. Sliding scale insulin q4hrs.   : Mancera to be discontinued today  Infectious disease: Tmax 100.4. Culture +Citrobacter, Klebsiella, and Enterococcus gallinarum and Enterococcus faecalis. Stop zosyn/vanco. Start Imipenem-cilastatin. Continue diflucan. Follow up culture results.  Prophylaxis: Heparin subcutaneous, PPI  Activity: OOB to chair. Ambulate TID.  Disposition: Transplant liver surgery primary. Transfer to      [facility]/Fellow/Resident Provider: Gloria Kerr PA-C    Faculty: Alejandro Becker M.D.     __________________________________________________________________  Transplant History: Admitted 7/10/2024 for s/p hepaticojejunostomy, melinda en y, ARABELLA 2/2 CBD s/p cholecystectomy.  N/A, Postoperative day:      Interval History: History is obtained from the patient  Overnight events: Pain control adequate. +Flatus. Denies SOB or nausea. Plan to ambulate.    ROS:   A 10-point review of  "systems was negative except as noted above.    Meds:  Current Facility-Administered Medications   Medication Dose Route Frequency Provider Last Rate Last Admin    [Held by provider] acetaminophen (TYLENOL) tablet 975 mg  975 mg Oral Q8H Connor Lux MD        famotidine (PEPCID) tablet 20 mg  20 mg Oral BID Connor Lux MD        Or    famotidine (PEPCID) injection 20 mg  20 mg Intravenous BID Connor Lux MD   20 mg at 07/11/24 0120    fluconazole (DIFLUCAN) intermittent infusion 200 mg  200 mg Intravenous Q24H Alejandro Becker MD        heparin ANTICOAGULANT injection 4,000 Units  4,000 Units Subcutaneous Q8H Connor Lux MD        insulin aspart (NovoPen ECHO/NovoLOG) cartridge  0.5-2.5 Units Subcutaneous TID AC Connor Lux MD   0.5 Units at 07/10/24 1939    insulin aspart (NovoPen ECHO/NovoLOG) cartridge  0.5-2.5 Units Subcutaneous At Bedtime Connor Lux MD   1 Units at 07/11/24 0058    metoprolol (LOPRESSOR) injection 5 mg  5 mg Intravenous Q6H Connor Lux MD   5 mg at 07/10/24 1630    piperacillin-tazobactam (ZOSYN) 3.375 g vial to attach to  mL bag  3.375 g Intravenous Q6H Alejanrdo Becker MD   3.375 g at 07/11/24 0520    polyethylene glycol (MIRALAX) Packet 17 g  17 g Oral Daily Connor Lux MD        senna-docusate (SENOKOT-S/PERICOLACE) 8.6-50 MG per tablet 1 tablet  1 tablet Oral BID Connor Lux MD        sodium chloride (PF) 0.9% PF flush 3 mL  3 mL Intracatheter Q8H Connor Lux MD        vancomycin (VANCOCIN) 1,000 mg in 200 mL dextrose intermittent infusion  1,000 mg Intravenous Pre-Op/Pre-procedure x 1 dose Connor Lux  mL/hr at 07/11/24 0121 1,000 mg at 07/11/24 0121       Physical Exam:     Admit Weight: 60.8 kg (134 lb 0.6 oz)    Current vitals:   BP 97/57   Pulse 88   Temp 97.3  F (36.3  C) (Oral)   Resp 18   Ht 1.613 m (5' 3.5\")   Wt 60.7 kg (133 lb 13.1 oz)   SpO2 96%   BMI 23.33 kg/m           Vital sign ranges:    Temp:  [97 "  F (36.1  C)-100.4  F (38  C)] 97.3  F (36.3  C)  Pulse:  [] 88  Resp:  [13-34] 18  BP: ()/(55-90) 97/57  MAP:  [69 mmHg-287 mmHg] 69 mmHg  Arterial Line BP: (122-210)/(42-94) 122/42  SpO2:  [93 %-100 %] 96 %  Patient Vitals for the past 24 hrs:   BP Temp Temp src Pulse Resp SpO2 Weight   07/11/24 0023 97/57 97.3  F (36.3  C) Oral 88 18 96 % --   07/10/24 2300 109/58 98.3  F (36.8  C) Oral 94 17 93 % 60.7 kg (133 lb 13.1 oz)   07/10/24 2215 118/58 -- -- 92 19 94 % --   07/10/24 2200 117/62 -- -- 92 21 95 % --   07/10/24 2145 113/57 -- -- 93 22 94 % --   07/10/24 2130 121/58 -- -- 94 20 95 % --   07/10/24 2115 114/65 -- -- 94 19 95 % --   07/10/24 2100 113/58 -- -- 93 19 96 % --   07/10/24 2045 112/55 -- -- 94 19 95 % --   07/10/24 2030 127/65 100.4  F (38  C) Oral 95 18 94 % --   07/10/24 2024 127/65 -- -- 94 22 96 % --   07/10/24 2015 125/66 -- -- 95 22 96 % --   07/10/24 2000 133/66 -- -- 97 20 95 % --   07/10/24 1945 138/70 -- -- 98 23 95 % --   07/10/24 1930 138/68 97.4  F (36.3  C) Temporal 97 21 96 % --   07/10/24 1915 (!) 142/63 97.6  F (36.4  C) -- 98 20 98 % --   07/10/24 1900 (!) 150/75 98.5  F (36.9  C) Temporal 100 19 100 % --   07/10/24 1845 (!) 169/74 98.4  F (36.9  C) -- 100 22 100 % --   07/10/24 1830 (!) 155/71 98.5  F (36.9  C) -- 98 21 100 % --   07/10/24 1815 139/67 98.1  F (36.7  C) -- 90 22 99 % --   07/10/24 1800 139/67 98.6  F (37  C) -- 88 23 99 % --   07/10/24 1745 (!) 142/68 98.5  F (36.9  C) -- 95 21 99 % --   07/10/24 1730 139/67 99  F (37.2  C) -- 91 20 99 % --   07/10/24 1715 (!) 153/75 98.6  F (37  C) -- 96 (!) 34 100 % --   07/10/24 1700 (!) 146/79 98.4  F (36.9  C) -- 90 17 100 % --   07/10/24 1645 (!) 184/88 98.7  F (37.1  C) -- 110 18 100 % --   07/10/24 1630 (!) 189/90 98.2  F (36.8  C) -- 108 21 100 % --   07/10/24 1615 (!) 180/89 97.8  F (36.6  C) -- 102 24 100 % --   07/10/24 1600 (!) 151/80 97.6  F (36.4  C) -- 94 19 100 % --   07/10/24 1545 115/69 97.4  F (36.3   C) Temporal 97 15 97 % --   07/10/24 1540 117/71 97  F (36.1  C) Temporal 98 16 97 % --   07/10/24 0735 (!) 141/60 -- -- 99 19 100 % --   07/10/24 0730 133/60 -- -- 93 19 100 % --   07/10/24 0725 134/69 -- -- 87 19 99 % --   07/10/24 0720 129/62 -- -- 82 20 99 % --   07/10/24 0715 129/63 -- -- 85 26 100 % --   07/10/24 0710 136/64 -- -- 86 22 100 % --   07/10/24 0705 130/73 -- -- 83 17 100 % --   07/10/24 0700 133/65 -- -- 81 13 -- --   07/10/24 0647 136/62 98  F (36.7  C) Oral -- 16 95 % --     General Appearance: in no apparent distress.   Skin: normal, dry  Heart: regular rate and rhythm  Lungs: NLB on RA  Abdomen: The abdomen is flat, and  mildly tender, around surgical incision. The wound is covered with surgical dressing. MILLI x 2 serosanguinous output. Biliary drain to gravity drainage, minimal output.  : yusuf is present.    Extremities: edema: absent.   Neurologic: awake, alert, and oriented.     Data:   CMP  Recent Labs   Lab 07/11/24  0529 07/11/24  0035 07/10/24  1743 07/10/24  1624 07/10/24  1610 07/10/24  1404 07/10/24  1302 07/10/24  0646 07/09/24  1438   NA  --   --   --  135  --  136 135   < > 136   POTASSIUM  --   --   --  5.0  --  4.7 4.6   < > 4.2   CHLORIDE  --   --   --  103  --   --   --   --  101   CO2  --   --   --  19*  --   --   --   --  23   * 291*   < > 186*   < > 131* 128*   < > 119*   BUN  --   --   --  25.8*  --   --   --   --  31.4*   CR  --   --   --  0.57  --   --   --   --  0.65   GFRESTIMATED  --   --   --  >90  --   --   --   --  >90   CHEY  --   --   --  8.4*  --   --   --   --  9.8   ICAW  --   --   --   --   --  4.6 4.8   < >  --    ALBUMIN  --   --   --  3.2*  --   --   --   --  3.6   BILITOTAL  --   --   --  1.3*  --   --   --   --  0.2   ALKPHOS  --   --   --  346*  --   --   --   --  278*   AST  --   --   --  3,824*  --   --   --   --  34   ALT  --   --   --  1,836*  --   --   --   --  50    < > = values in this interval not displayed.     CBC  Recent Labs   Lab  07/11/24  0546 07/10/24  1624 07/10/24  0848 07/09/24  1438   HGB 9.1* 10.7*   < > 9.5*   WBC 22.8* 20.3*  --  11.5*    492*  --  526*   A1C  --   --   --  7.4*    < > = values in this interval not displayed.     COAGS  Recent Labs   Lab 07/11/24  0546 07/10/24  1624   INR 1.39* 1.21*   PTT  --  29      Urinalysis  No lab results found.

## 2024-07-11 NOTE — PROGRESS NOTES
"  Transplant Surgery Progress Note  Surgery Cross-Cover  Post Op Check    07/11/2024    Rocío Russell is a 73 year old female POD#0 s/p Procedure(s):  HEPATICOJEJUNOSTOMY, Tamiko En-Y, Lysis of adhesions for Pre-Op Diagnosis Codes:     * Injury of bile duct [S36.13XA]    Pt reports their pain is controlled with current regimen. Denies nausea, SOB, chest pain, or dizziness. Patient feels gas in her abdomen but denies bm and is Is voiding via urinary catheter.     /58   Pulse 94   Temp 98.3  F (36.8  C) (Oral)   Resp 17   Ht 1.613 m (5' 3.5\")   Wt 60.7 kg (133 lb 13.1 oz)   SpO2 93%   BMI 23.33 kg/m      Gen: A&O x4, NAD   Chest: breathing non-labored on RA   Abdomen: soft, appropriately tender, non-distended  Incision: clean, dry, intact covered by dressings  Extremities: warm and well perfused  Devices: Capnography, urinary catheter, left-sided MILLI drain with serosanguineous output, PEG, biliary drain of right side. NG in place with minimal output.     A/P: No acute post-op issues. Continue plan of care per primary team. Please call with any questions.    Honorio Bledsoe MD    "

## 2024-07-12 ENCOUNTER — APPOINTMENT (OUTPATIENT)
Dept: PHYSICAL THERAPY | Facility: CLINIC | Age: 74
DRG: 329 | End: 2024-07-12
Attending: TRANSPLANT SURGERY
Payer: MEDICARE

## 2024-07-12 ENCOUNTER — APPOINTMENT (OUTPATIENT)
Dept: OCCUPATIONAL THERAPY | Facility: CLINIC | Age: 74
DRG: 329 | End: 2024-07-12
Attending: TRANSPLANT SURGERY
Payer: MEDICARE

## 2024-07-12 LAB
ACINETOBACTER SPECIES: NOT DETECTED
ALBUMIN SERPL BCG-MCNC: 2.9 G/DL (ref 3.5–5.2)
ALP SERPL-CCNC: 234 U/L (ref 40–150)
ALT SERPL W P-5'-P-CCNC: 906 U/L (ref 0–50)
ANION GAP SERPL CALCULATED.3IONS-SCNC: 10 MMOL/L (ref 7–15)
AST SERPL W P-5'-P-CCNC: 677 U/L (ref 0–45)
BACTERIA FLD CULT: ABNORMAL
BILIRUB SERPL-MCNC: 0.4 MG/DL
BUN SERPL-MCNC: 15.9 MG/DL (ref 8–23)
CALCIUM SERPL-MCNC: 8.4 MG/DL (ref 8.8–10.2)
CHLORIDE SERPL-SCNC: 104 MMOL/L (ref 98–107)
CITROBACTER SPECIES: DETECTED
CREAT SERPL-MCNC: 0.53 MG/DL (ref 0.51–0.95)
CTX-M: NOT DETECTED
DEPRECATED HCO3 PLAS-SCNC: 20 MMOL/L (ref 22–29)
EGFRCR SERPLBLD CKD-EPI 2021: >90 ML/MIN/1.73M2
ENTEROBACTER SPECIES: NOT DETECTED
ENTEROCOCCUS FAECALIS: NOT DETECTED
ENTEROCOCCUS FAECIUM: NOT DETECTED
ERYTHROCYTE [DISTWIDTH] IN BLOOD BY AUTOMATED COUNT: 17.4 % (ref 10–15)
ESCHERICHIA COLI: NOT DETECTED
GLUCOSE BLDC GLUCOMTR-MCNC: 227 MG/DL (ref 70–99)
GLUCOSE BLDC GLUCOMTR-MCNC: 236 MG/DL (ref 70–99)
GLUCOSE BLDC GLUCOMTR-MCNC: 239 MG/DL (ref 70–99)
GLUCOSE BLDC GLUCOMTR-MCNC: 247 MG/DL (ref 70–99)
GLUCOSE BLDC GLUCOMTR-MCNC: 255 MG/DL (ref 70–99)
GLUCOSE SERPL-MCNC: 240 MG/DL (ref 70–99)
HCT VFR BLD AUTO: 27.8 % (ref 35–47)
HGB BLD-MCNC: 9.2 G/DL (ref 11.7–15.7)
IMP: NOT DETECTED
INR PPP: 1.31 (ref 0.85–1.15)
KLEBSIELLA OXYTOCA: NOT DETECTED
KLEBSIELLA PNEUMONIAE: NOT DETECTED
KPC: NOT DETECTED
LISTERIA SPECIES (DETECTED/NOT DETECTED): NOT DETECTED
MCH RBC QN AUTO: 26.3 PG (ref 26.5–33)
MCHC RBC AUTO-ENTMCNC: 33.1 G/DL (ref 31.5–36.5)
MCV RBC AUTO: 79 FL (ref 78–100)
NDM: NOT DETECTED
OXA (DETECTED/NOT DETECTED): NOT DETECTED
PLATELET # BLD AUTO: 319 10E3/UL (ref 150–450)
POTASSIUM SERPL-SCNC: 4.3 MMOL/L (ref 3.4–5.3)
PROT SERPL-MCNC: 5.9 G/DL (ref 6.4–8.3)
PROTEUS SPECIES: NOT DETECTED
PSEUDOMONAS AERUGINOSA: NOT DETECTED
RBC # BLD AUTO: 3.5 10E6/UL (ref 3.8–5.2)
SODIUM SERPL-SCNC: 134 MMOL/L (ref 135–145)
STAPHYLOCOCCUS AUREUS: NOT DETECTED
STAPHYLOCOCCUS EPIDERMIDIS: NOT DETECTED
STAPHYLOCOCCUS LUGDUNENSIS: NOT DETECTED
STAPHYLOCOCCUS SPECIES: NOT DETECTED
STREPTOCOCCUS AGALACTIAE: NOT DETECTED
STREPTOCOCCUS ANGINOSUS GROUP: NOT DETECTED
STREPTOCOCCUS PNEUMONIAE: NOT DETECTED
STREPTOCOCCUS PYOGENES: NOT DETECTED
STREPTOCOCCUS SPECIES: NOT DETECTED
VIM: NOT DETECTED
WBC # BLD AUTO: 17.8 10E3/UL (ref 4–11)

## 2024-07-12 PROCEDURE — 250N000009 HC RX 250: Performed by: STUDENT IN AN ORGANIZED HEALTH CARE EDUCATION/TRAINING PROGRAM

## 2024-07-12 PROCEDURE — 97530 THERAPEUTIC ACTIVITIES: CPT | Mod: GO

## 2024-07-12 PROCEDURE — 258N000003 HC RX IP 258 OP 636: Performed by: STUDENT IN AN ORGANIZED HEALTH CARE EDUCATION/TRAINING PROGRAM

## 2024-07-12 PROCEDURE — 85610 PROTHROMBIN TIME: CPT | Performed by: PHYSICIAN ASSISTANT

## 2024-07-12 PROCEDURE — 97116 GAIT TRAINING THERAPY: CPT | Mod: GP | Performed by: PHYSICAL THERAPIST

## 2024-07-12 PROCEDURE — 250N000011 HC RX IP 250 OP 636: Performed by: STUDENT IN AN ORGANIZED HEALTH CARE EDUCATION/TRAINING PROGRAM

## 2024-07-12 PROCEDURE — 97535 SELF CARE MNGMENT TRAINING: CPT | Mod: GO

## 2024-07-12 PROCEDURE — 250N000013 HC RX MED GY IP 250 OP 250 PS 637: Performed by: PHYSICIAN ASSISTANT

## 2024-07-12 PROCEDURE — 36415 COLL VENOUS BLD VENIPUNCTURE: CPT | Performed by: PHYSICIAN ASSISTANT

## 2024-07-12 PROCEDURE — 97530 THERAPEUTIC ACTIVITIES: CPT | Mod: GP | Performed by: PHYSICAL THERAPIST

## 2024-07-12 PROCEDURE — 250N000013 HC RX MED GY IP 250 OP 250 PS 637: Performed by: STUDENT IN AN ORGANIZED HEALTH CARE EDUCATION/TRAINING PROGRAM

## 2024-07-12 PROCEDURE — 80053 COMPREHEN METABOLIC PANEL: CPT | Performed by: PHYSICIAN ASSISTANT

## 2024-07-12 PROCEDURE — 120N000011 HC R&B TRANSPLANT UMMC

## 2024-07-12 PROCEDURE — 87040 BLOOD CULTURE FOR BACTERIA: CPT | Performed by: PHYSICIAN ASSISTANT

## 2024-07-12 PROCEDURE — 97165 OT EVAL LOW COMPLEX 30 MIN: CPT | Mod: GO

## 2024-07-12 PROCEDURE — 99024 POSTOP FOLLOW-UP VISIT: CPT | Performed by: TRANSPLANT SURGERY

## 2024-07-12 PROCEDURE — 85027 COMPLETE CBC AUTOMATED: CPT | Performed by: PHYSICIAN ASSISTANT

## 2024-07-12 PROCEDURE — 250N000013 HC RX MED GY IP 250 OP 250 PS 637: Performed by: TRANSPLANT SURGERY

## 2024-07-12 PROCEDURE — 250N000011 HC RX IP 250 OP 636: Performed by: TRANSPLANT SURGERY

## 2024-07-12 PROCEDURE — 258N000003 HC RX IP 258 OP 636: Performed by: TRANSPLANT SURGERY

## 2024-07-12 PROCEDURE — 250N000011 HC RX IP 250 OP 636: Performed by: PHYSICIAN ASSISTANT

## 2024-07-12 RX ORDER — LISINOPRIL 40 MG/1
40 TABLET ORAL DAILY
Status: DISCONTINUED | OUTPATIENT
Start: 2024-07-12 | End: 2024-07-18 | Stop reason: HOSPADM

## 2024-07-12 RX ORDER — DULOXETIN HYDROCHLORIDE 20 MG/1
60 CAPSULE, DELAYED RELEASE ORAL DAILY
Status: DISCONTINUED | OUTPATIENT
Start: 2024-07-12 | End: 2024-07-18 | Stop reason: HOSPADM

## 2024-07-12 RX ORDER — GABAPENTIN 300 MG/1
600 CAPSULE ORAL 2 TIMES DAILY
Status: DISCONTINUED | OUTPATIENT
Start: 2024-07-12 | End: 2024-07-18 | Stop reason: HOSPADM

## 2024-07-12 RX ORDER — AMITRIPTYLINE HYDROCHLORIDE 10 MG/1
10 TABLET ORAL AT BEDTIME
Status: DISCONTINUED | OUTPATIENT
Start: 2024-07-12 | End: 2024-07-18 | Stop reason: HOSPADM

## 2024-07-12 RX ORDER — HYDRALAZINE HYDROCHLORIDE 20 MG/ML
10 INJECTION INTRAMUSCULAR; INTRAVENOUS EVERY 6 HOURS PRN
Status: DISCONTINUED | OUTPATIENT
Start: 2024-07-12 | End: 2024-07-18

## 2024-07-12 RX ADMIN — HYDROMORPHONE HYDROCHLORIDE 2 MG: 2 TABLET ORAL at 21:14

## 2024-07-12 RX ADMIN — POTASSIUM CHLORIDE, DEXTROSE MONOHYDRATE AND SODIUM CHLORIDE: 150; 5; 450 INJECTION, SOLUTION INTRAVENOUS at 13:50

## 2024-07-12 RX ADMIN — GABAPENTIN 600 MG: 300 CAPSULE ORAL at 10:06

## 2024-07-12 RX ADMIN — SENNOSIDES AND DOCUSATE SODIUM 1 TABLET: 50; 8.6 TABLET ORAL at 21:13

## 2024-07-12 RX ADMIN — METOPROLOL TARTRATE 5 MG: 5 INJECTION INTRAVENOUS at 23:46

## 2024-07-12 RX ADMIN — INSULIN ASPART 3 UNITS: 100 INJECTION, SOLUTION INTRAVENOUS; SUBCUTANEOUS at 13:51

## 2024-07-12 RX ADMIN — HYDROMORPHONE HYDROCHLORIDE 0.4 MG: 0.2 INJECTION, SOLUTION INTRAMUSCULAR; INTRAVENOUS; SUBCUTANEOUS at 08:33

## 2024-07-12 RX ADMIN — LISINOPRIL 40 MG: 40 TABLET ORAL at 10:07

## 2024-07-12 RX ADMIN — HEPARIN SODIUM 5000 UNITS: 5000 INJECTION, SOLUTION INTRAVENOUS; SUBCUTANEOUS at 17:58

## 2024-07-12 RX ADMIN — AMITRIPTYLINE HYDROCHLORIDE 10 MG: 10 TABLET, FILM COATED ORAL at 23:46

## 2024-07-12 RX ADMIN — FAMOTIDINE 20 MG: 20 TABLET ORAL at 10:06

## 2024-07-12 RX ADMIN — IMIPENEM AND CILASTATIN SODIUM 1000 MG OF IMIPENEM: 500; 500 INJECTION, POWDER, FOR SOLUTION INTRAVENOUS at 23:46

## 2024-07-12 RX ADMIN — SENNOSIDES AND DOCUSATE SODIUM 1 TABLET: 50; 8.6 TABLET ORAL at 10:06

## 2024-07-12 RX ADMIN — FLUCONAZOLE IN SODIUM CHLORIDE 200 MG: 2 INJECTION, SOLUTION INTRAVENOUS at 17:47

## 2024-07-12 RX ADMIN — INSULIN ASPART 2 UNITS: 100 INJECTION, SOLUTION INTRAVENOUS; SUBCUTANEOUS at 10:09

## 2024-07-12 RX ADMIN — DULOXETINE HYDROCHLORIDE 60 MG: 60 CAPSULE, DELAYED RELEASE ORAL at 10:07

## 2024-07-12 RX ADMIN — FAMOTIDINE 20 MG: 20 TABLET ORAL at 21:14

## 2024-07-12 RX ADMIN — POTASSIUM CHLORIDE, DEXTROSE MONOHYDRATE AND SODIUM CHLORIDE: 150; 5; 450 INJECTION, SOLUTION INTRAVENOUS at 00:47

## 2024-07-12 RX ADMIN — IMIPENEM AND CILASTATIN SODIUM 1000 MG OF IMIPENEM: 500; 500 INJECTION, POWDER, FOR SOLUTION INTRAVENOUS at 16:58

## 2024-07-12 RX ADMIN — ONDANSETRON 4 MG: 2 INJECTION INTRAMUSCULAR; INTRAVENOUS at 08:40

## 2024-07-12 RX ADMIN — INSULIN ASPART 2 UNITS: 100 INJECTION, SOLUTION INTRAVENOUS; SUBCUTANEOUS at 01:19

## 2024-07-12 RX ADMIN — HYDROMORPHONE HYDROCHLORIDE 0.2 MG: 0.2 INJECTION, SOLUTION INTRAMUSCULAR; INTRAVENOUS; SUBCUTANEOUS at 10:54

## 2024-07-12 RX ADMIN — HYDROMORPHONE HYDROCHLORIDE 0.2 MG: 0.2 INJECTION, SOLUTION INTRAMUSCULAR; INTRAVENOUS; SUBCUTANEOUS at 04:37

## 2024-07-12 RX ADMIN — HEPARIN SODIUM 5000 UNITS: 5000 INJECTION, SOLUTION INTRAVENOUS; SUBCUTANEOUS at 23:46

## 2024-07-12 RX ADMIN — METOPROLOL TARTRATE 5 MG: 5 INJECTION INTRAVENOUS at 16:43

## 2024-07-12 RX ADMIN — HEPARIN SODIUM 5000 UNITS: 5000 INJECTION, SOLUTION INTRAVENOUS; SUBCUTANEOUS at 00:36

## 2024-07-12 RX ADMIN — INSULIN ASPART 2 UNITS: 100 INJECTION, SOLUTION INTRAVENOUS; SUBCUTANEOUS at 04:37

## 2024-07-12 RX ADMIN — HYDROMORPHONE HYDROCHLORIDE 4 MG: 2 TABLET ORAL at 13:51

## 2024-07-12 RX ADMIN — INSULIN ASPART 1 UNITS: 100 INJECTION, SOLUTION INTRAVENOUS; SUBCUTANEOUS at 23:46

## 2024-07-12 RX ADMIN — GABAPENTIN 600 MG: 300 CAPSULE ORAL at 21:13

## 2024-07-12 RX ADMIN — HYDROMORPHONE HYDROCHLORIDE 0.4 MG: 0.2 INJECTION, SOLUTION INTRAMUSCULAR; INTRAVENOUS; SUBCUTANEOUS at 00:56

## 2024-07-12 RX ADMIN — INSULIN ASPART 3 UNITS: 100 INJECTION, SOLUTION INTRAVENOUS; SUBCUTANEOUS at 17:57

## 2024-07-12 RX ADMIN — METOPROLOL TARTRATE 5 MG: 5 INJECTION INTRAVENOUS at 10:07

## 2024-07-12 RX ADMIN — SIMVASTATIN 40 MG: 40 TABLET, FILM COATED ORAL at 21:13

## 2024-07-12 RX ADMIN — IMIPENEM AND CILASTATIN SODIUM 500 MG OF IMIPENEM: 500; 500 INJECTION, POWDER, FOR SOLUTION INTRAVENOUS at 01:12

## 2024-07-12 RX ADMIN — HYDROMORPHONE HYDROCHLORIDE 0.4 MG: 0.2 INJECTION, SOLUTION INTRAMUSCULAR; INTRAVENOUS; SUBCUTANEOUS at 23:16

## 2024-07-12 RX ADMIN — HYDROMORPHONE HYDROCHLORIDE 0.2 MG: 0.2 INJECTION, SOLUTION INTRAMUSCULAR; INTRAVENOUS; SUBCUTANEOUS at 16:44

## 2024-07-12 RX ADMIN — HEPARIN SODIUM 5000 UNITS: 5000 INJECTION, SOLUTION INTRAVENOUS; SUBCUTANEOUS at 10:07

## 2024-07-12 RX ADMIN — METOPROLOL TARTRATE 5 MG: 5 INJECTION INTRAVENOUS at 01:03

## 2024-07-12 ASSESSMENT — ACTIVITIES OF DAILY LIVING (ADL)
ADLS_ACUITY_SCORE: 42
ADLS_ACUITY_SCORE: 42
ADLS_ACUITY_SCORE: 44
ADLS_ACUITY_SCORE: 42
ADLS_ACUITY_SCORE: 44
ADLS_ACUITY_SCORE: 44
ADLS_ACUITY_SCORE: 42
ADLS_ACUITY_SCORE: 42
ADLS_ACUITY_SCORE: 44
ADLS_ACUITY_SCORE: 42
ADLS_ACUITY_SCORE: 42
ADLS_ACUITY_SCORE: 44
ADLS_ACUITY_SCORE: 44
ADLS_ACUITY_SCORE: 42
ADLS_ACUITY_SCORE: 44
ADLS_ACUITY_SCORE: 44
ADLS_ACUITY_SCORE: 42

## 2024-07-12 NOTE — PLAN OF CARE
"Goal Outcome Evaluation:      Plan of Care Reviewed With: patient    Overall Patient Progress: no changeOverall Patient Progress: no change    Shift: 4970-7012    VS: BP (!) 162/75 (BP Location: Left arm)   Pulse 94   Temp 98.9  F (37.2  C) (Oral)   Resp 20   Ht 1.613 m (5' 3.5\")   Wt 61.9 kg (136 lb 6.4 oz)   SpO2 96%   BMI 23.78 kg/m      Pain: Abdominal/incisional pain. Improved with PRNs dilaudid IV and ice packs.   Neuro: A&O, denies light headedness or dizzy.    Cardiac: Denies chest pain. SR. HR 70-90s. High BP on scheduled metoprolol, -163.   Respiratory: Denies SOB. Sating  on RA.   Diet/Appetite: Strict NPO, except ice chips. Held oral medication last night and keep strict NPO per general surgery.    GI/: BG Q4H. BM x1 this morning. Voiding adequately via pure wick or up to the commode with two assist.    LDA's: Right and left arm PIV infusing. Abdominal MILLI drains x2 and biliary drain x1. NGT to low intermittent suction.  Skin: No new deficit noted. See PCS for details.    Activity: Assist of two.   Electrolyte: K+, Mg. Phos       Changes during this shift: Positive blood cultures. MD aware. See lab for details.               "

## 2024-07-12 NOTE — PROGRESS NOTES
Agree with below notes  Transplant Surgery  Inpatient Daily Progress Note  07/12/2024    Assessment & Plan: 73 year old female who developed a stricture of the CBD s/p Cholecystectomy. Patient now s/p Hepaticojejunostomy, Melinda En Y, and ARABELLA on 7/10/24. Surgeon was Dr. Becker.     Cardiorespiratory: Stable   HTN: PTA Lisinopril, Lasix. SBP up to 160s systolic. Will restart home lisinopril.  HLD: PTA fenofibrate  Hematology: Hgb stable, 9-10  GI/Nutrition: Minimal NG tube output. + BM. Remove NG tube today. Start with liquid diet, can advance as tolerated.  Fluid/Electrolytes: MIVF: discontinue IVF  Replete electrolytes PRN  Endocrine:   DM2: PTA metformin and glimepiride. Hold. Sliding scale insulin q4hrs.   : Mancera removed  Infectious disease: Tmax 99. Culture +Citrobacter, Klebsiella, and Enterococcus gallinarum and Enterococcus faecalis. Also with positive blood cultures. Stopped zosyn/vanco. Started Imipenem-cilastatin. Continue diflucan. Follow up culture results.  Prophylaxis: Heparin subcutaneous, PPI  Activity: OOB to chair. Ambulate TID.  Disposition: Transplant liver surgery primary. Transfer to      DENIS/Fellow/Resident Provider: Aminata Jay PA-C [id number]    Faculty: Alejandro Becker M.D.     __________________________________________________________________  Transplant History: Admitted 7/10/2024 for s/p hepaticojejunostomy, melinda en y, ARABELLA 2/2 CBD s/p cholecystectomy.  N/A, Postoperative day:      Interval History: History is obtained from the patient  Overnight events: Pain control adequate. +BM. Denies SOB or nausea. Getting out of bed    ROS:   A 10-point review of systems was negative except as noted above.    Meds:  Current Facility-Administered Medications   Medication Dose Route Frequency Provider Last Rate Last Admin    [Held by provider] acetaminophen (TYLENOL) tablet 975 mg  975 mg Oral Q8H Connor Lux MD        amitriptyline (ELAVIL) tablet 10 mg  10 mg Oral At Bedtime  "Alejandro Becker MD        DULoxetine (CYMBALTA) DR capsule 60 mg  60 mg Oral Daily Alejandro Becker MD   60 mg at 07/12/24 1007    famotidine (PEPCID) tablet 20 mg  20 mg Oral BID Connor Lux MD   20 mg at 07/12/24 1006    fluconazole (DIFLUCAN) intermittent infusion 200 mg  200 mg Intravenous Q24H Alejandro Becker  mL/hr at 07/11/24 1641 200 mg at 07/11/24 1641    gabapentin (NEURONTIN) capsule 600 mg  600 mg Oral BID Alejandro Becker MD   600 mg at 07/12/24 1006    heparin ANTICOAGULANT injection 5,000 Units  5,000 Units Subcutaneous Q8H Alejandro Becker MD   5,000 Units at 07/12/24 1007    imipenem-cilastatin (PRIMAXIN) 1,000 mg of imipenem in sodium chloride 0.9 % 250 mL intermittent infusion  1,000 mg of imipenem Intravenous Q8H Alejandro Becker MD        insulin aspart (NovoLOG) injection (RAPID ACTING)  1-7 Units Subcutaneous Q4H Gloria Kerr PA-C   3 Units at 07/12/24 1351    lisinopril (ZESTRIL) tablet 40 mg  40 mg Oral Daily Aminata Jay PA-C   40 mg at 07/12/24 1007    metoprolol (LOPRESSOR) injection 5 mg  5 mg Intravenous Q6H Connor Lux MD   5 mg at 07/12/24 1007    polyethylene glycol (MIRALAX) Packet 17 g  17 g Oral Daily Connor Lux MD        senna-docusate (SENOKOT-S/PERICOLACE) 8.6-50 MG per tablet 1 tablet  1 tablet Oral BID Connor Lux MD   1 tablet at 07/12/24 1006    simvastatin (ZOCOR) tablet 40 mg  40 mg Oral At Bedtime Gloria Kerr PA-C        sodium chloride (PF) 0.9% PF flush 3 mL  3 mL Intracatheter Q8H Connor Lux MD   3 mL at 07/12/24 0847       Physical Exam:     Admit Weight: 60.8 kg (134 lb 0.6 oz)    Current vitals:   BP (!) 165/72 (BP Location: Left arm)   Pulse 76   Temp 98.3  F (36.8  C) (Oral)   Resp 18   Ht 1.613 m (5' 3.5\")   Wt 61.9 kg (136 lb 6.4 oz)   SpO2 98%   BMI 23.78 kg/m           Vital sign ranges:    Temp:  [97.7  F (36.5  C)-99  F (37.2  C)] 98.3  F (36.8  C)  Pulse:  [72-94] " 76  Resp:  [16-20] 18  BP: (134-166)/(65-79) 165/72  SpO2:  [93 %-98 %] 98 %  Patient Vitals for the past 24 hrs:   BP Temp Temp src Pulse Resp SpO2 Weight   07/12/24 1137 (!) 165/72 98.3  F (36.8  C) Oral 76 18 98 % --   07/12/24 1023 (!) 160/69 -- -- 76 -- -- --   07/12/24 0728 (!) 166/73 -- -- 80 -- 96 % --   07/12/24 0725 (!) 158/74 97.7  F (36.5  C) Oral 86 16 96 % --   07/12/24 0520 -- -- -- -- -- -- 61.9 kg (136 lb 6.4 oz)   07/12/24 0452 -- -- -- 94 -- 96 % --   07/12/24 0433 (!) 162/75 98.9  F (37.2  C) Oral 81 -- 96 % --   07/12/24 0111 -- -- -- 83 -- 93 % --   07/12/24 0048 (!) 163/79 99  F (37.2  C) Oral 89 -- 94 % --   07/11/24 2307 -- -- -- 94 -- 94 % --   07/11/24 2252 -- -- -- 87 -- 96 % --   07/11/24 2056 -- -- -- 77 -- 94 % --   07/11/24 2046 -- -- -- 88 -- 95 % --   07/11/24 1915 (!) 151/65 -- -- -- -- 93 % --   07/11/24 1910 (!) 153/67 98.5  F (36.9  C) Oral 85 20 -- --   07/11/24 1516 134/68 98  F (36.7  C) Oral 72 18 98 % --     General Appearance: in no apparent distress.   Skin: normal, dry  Heart: regular rate and rhythm  Lungs: NLB on RA  Abdomen: The abdomen is flat, and  mildly tender, around surgical incision. The wound is covered with surgical dressing. MILLI x 2 serosanguinous output. Biliary drain to gravity drainage- now capped.  : yusuf is present.    Extremities: edema: absent.   Neurologic: awake, alert, and oriented.     Data:   CMP  Recent Labs   Lab 07/12/24  1314 07/12/24  0853 07/12/24  0600 07/11/24  1209 07/11/24  0546 07/10/24  1610 07/10/24  1404 07/10/24  1302   NA  --   --  134*  --  132*   < > 136 135   POTASSIUM  --   --  4.3  --  4.4   < > 4.7 4.6   CHLORIDE  --   --  104  --  102   < >  --   --    CO2  --   --  20*  --  19*   < >  --   --    * 239* 240*   < > 347*   < > 131* 128*   BUN  --   --  15.9  --  29.1*   < >  --   --    CR  --   --  0.53  --  0.80   < >  --   --    GFRESTIMATED  --   --  >90  --  77   < >  --   --    CHEY  --   --  8.4*  --  7.9*   < >   --   --    ICAW  --   --   --   --   --   --  4.6 4.8   ALBUMIN  --   --  2.9*  --  3.0*   < >  --   --    BILITOTAL  --   --  0.4  --  0.6   < >  --   --    ALKPHOS  --   --  234*  --  272*   < >  --   --    AST  --   --  677*  --  2,323*   < >  --   --    ALT  --   --  906*  --  1,343*   < >  --   --     < > = values in this interval not displayed.     CBC  Recent Labs   Lab 07/12/24  0600 07/11/24  0546 07/10/24  0848 07/09/24  1438   HGB 9.2* 9.1*   < > 9.5*   WBC 17.8* 22.8*   < > 11.5*    345   < > 526*   A1C  --   --   --  7.4*    < > = values in this interval not displayed.     COAGS  Recent Labs   Lab 07/12/24  0600 07/11/24  0546 07/10/24  1624   INR 1.31* 1.39* 1.21*   PTT  --   --  29      Urinalysis  No lab results found.

## 2024-07-12 NOTE — PROGRESS NOTES
Care Coordinator  D/I: I received a call from [facility] RN George @ 5240 this morning. She wanted an update and informed me that pt's daughter said that DR Jones informed them that pt would be here 10 days, until approx 7/20. George is off on 7/20 and her co worker Jami broderick for her Ph: [phone].  P:  fax is [phone]--please call them Monday 7/15 with update.

## 2024-07-12 NOTE — PROGRESS NOTES
07/12/24 1154   Appointment Info   Signing Clinician's Name / Credentials (OT) Eboni Conley OTR/L   Rehab Comments (OT) abdominal precautions   Living Environment   People in Home child(yifan), adult   Current Living Arrangements house   Home Accessibility stairs to enter home   Number of Stairs, Main Entrance 7   Stair Railings, Main Entrance railings on both sides of stairs   Transportation Anticipated family or friend will provide   Living Environment Comments Pt reports living in house with daughter who can provide 24/7 A. Has tub shower with shower chair.   Self-Care   Usual Activity Tolerance moderate   Current Activity Tolerance fair   Regular Exercise Other (see comments)  (has been receiving home health PT)   Equipment Currently Used at Home grab bar, toilet;grab bar, tub/shower;shower chair;walker, rolling;walker, standard;wheelchair, manual;other (see comments);commode chair;dressing device   Fall history within last six months yes   Number of times patient has fallen within last six months 2   Activity/Exercise/Self-Care Comment Pt and daughter report pt receives supervision and assist PRN for ADLs. Pt reports had recently graduated from  OT. 2 falls due to drop foot recently resulting in fractures.   Instrumental Activities of Daily Living (IADL)   IADL Comments Daughter reports assisting/completing IADLs.   General Information   Onset of Illness/Injury or Date of Surgery 07/10/24   Referring Physician Connor Lux MD   Patient/Family Therapy Goal Statement (OT) to go home   Additional Occupational Profile Info/Pertinent History of Current Problem Per chart: 73 year old female who developed a stricture of the CBD s/p Cholecystectomy. Patient now s/p Hepaticojejunostomy, Tamiko En Y, and ARABELLA on 7/10/24.   Existing Precautions/Restrictions fall;abdominal;weight bearing   Limitations/Impairments visual   Left Upper Extremity (Weight-bearing Status) other (see comments)  (<10#s)   Right Upper  Extremity (Weight-bearing Status) other (see comments)  (<10#s)   Left Lower Extremity (Weight-bearing Status) weight-bearing as tolerated (WBAT)  (cam boot 2/2 broken metatarsal)   Right Lower Extremity (Weight-bearing Status) full weight-bearing (FWB)   General Observations and Info Activity Ambulate with assist   Cognitive Status Examination   Orientation Status orientation to person, place and time   Affect/Mental Status (Cognitive) WFL   Follows Commands WFL   Cognitive Status Comments Pt initially lethargic but with activity improved alertness. Will continue to monitor.   Visual Perception   Visual Impairment/Limitations legally blind   Sensory   Sensory Quick Adds other (see comments)   Sensory Comments   (reports sciatic nerve pain down R side impacting sensation)   Pain Assessment   Patient Currently in Pain Yes, see Vital Sign flowsheet  (at surgical site)   Posture   Posture forward head position;protracted shoulders   Range of Motion Comprehensive   General Range of Motion bilateral upper extremity ROM WFL   Strength Comprehensive (MMT)   Comment, General Manual Muscle Testing (MMT) Assessment Not formally assessed, demonstrates generalized deconditioning   Muscle Tone Assessment   Muscle Tone Quick Adds No deficits were identified   Coordination   Upper Extremity Coordination No deficits were identified   Bed Mobility   Comment (Bed Mobility) Ax1   Transfers   Transfer Comments Mod Ax1 with FWW   Balance   Balance Comments MinAx1 with FWW   Activities of Daily Living   BADL Assessment/Intervention bathing;upper body dressing;lower body dressing;grooming;toileting   Bathing Assessment/Intervention   Bailey Level (Bathing) moderate assist (50% patient effort)   Comment, (Bathing) per clinical reasoning   Upper Body Dressing Assessment/Training   Comment, (Upper Body Dressing) per clinical reasoning   Bailey Level (Upper Body Dressing) minimum assist (75% patient effort)   Lower Body Dressing  Assessment/Training   Comment, (Lower Body Dressing) donning CAM boot and socks   Ogden Level (Lower Body Dressing) dependent (less than 25% patient effort)   Grooming Assessment/Training   [place] Level (Grooming) contact guard assist   Comment, (Grooming) standing at sink   Toileting   Ogden Level (Toileting) contact guard assist   Clinical Impression   Criteria for Skilled Therapeutic Interventions Met (OT) Yes, treatment indicated   OT Diagnosis Decreased safety/engagment in ADLs/IADLs now with post surgical precautions   OT Problem List-Impairments impacting ADL problems related to;activity tolerance impaired;post-surgical precautions;pain;mobility   Assessment of Occupational Performance 1-3 Performance Deficits   Identified Performance Deficits dressing, bathing, toileting   Planned Therapy Interventions (OT) ADL retraining;IADL retraining;home program guidelines;progressive activity/exercise;risk factor education;transfer training;strengthening   Clinical Decision Making Complexity (OT) problem focused assessment/low complexity   Risk & Benefits of therapy have been explained evaluation/treatment results reviewed;care plan/treatment goals reviewed;risks/benefits reviewed;current/potential barriers reviewed;participants voiced agreement with care plan;participants included;patient;daughter   Clinical Impression Comments Pt will benefit from continued skilled OT services to progress IND and safety with ADLs/IADLs.   OT Total Evaluation Time   OT Eval, Low Complexity Minutes (84464) 7   OT Goals   Therapy Frequency (OT) 5 times/week   OT Predicted Duration/Target Date for Goal Attainment 07/19/24   OT Goals Hygiene/Grooming;Upper Body Dressing;Lower Body Dressing;Upper Body Bathing;Lower Body Bathing;Toilet Transfer/Toileting;OT Goal 1;Transfers   OT: Hygiene/Grooming modified independent   OT: Upper Body Dressing Modified independent   OT: Lower Body Dressing Minimal assist   OT: Upper Body  Bathing Supervision/stand-by assist   OT: Lower Body Bathing Minimal assist   OT: Transfer Supervision/stand-by assist;with assistive device  (tub tx)   OT: Toilet Transfer/Toileting Supervision/stand-by assist;within precautions;using adaptive equipment   OT: Goal 1 Pt will verbalize 100% of precautions to progress safety with ADLs/IADLs.   OT Discharge Planning   OT Plan review prec, LB dressing with AE, standing ADLs at sink, toileting   OT Discharge Recommendation (DC Rec) home with assist;home with home care occupational therapy   OT Rationale for DC Rec Pt below baseline but with excellent support at home. Anticipate will progress to home with  OT and 24/7 A from daughter   OT Brief overview of current status ModA STS and CGA mobility with FWW   Total Session Time   Total Session Time (sum of timed and untimed services) 7

## 2024-07-12 NOTE — PROGRESS NOTES
Antimicrobial Stewardship Team Note    Antimicrobial Stewardship Program - A joint venture between [facility] and  Physicians to optimize antibiotic management.  NOT a formal consult - Restricted Antimicrobial Review     Patient: Rocío Russell  MRN: [id number]  Allergies: Animal dander, Nickel, Perfume, Codeine, and Sulfa antibiotics    Brief Summary: Rocío Russell is a 73 year old female with a past medical history of HTN, HLD, h/o DVT, anemia, tobacco use, GERD, type 2 diabetes, and h/o symptomatic cholelithiasis s/p laparoscopic cholecystectomy (12/2023) complicated by common bile duct injury s/p percutaneous transhepatic biliary drain placement, G-tube placement and MILLI drain placement (excchanged 5/9/2024). Patient presented was admitted on 7/10/2024 and underwent planned Tamiko-en-Y hepaticojejunostomy and Lysis of adhesions.     History of Present Illness: No immediate post-operative complications and patient was transferred to med/surg unit. Patient was started on Zosyn, vancomycin, and fluconazole for tess-operative prophylaxis. Subjectively patient has appeared well and liver lab findings are improving. Patient was febrile post-op on 7/10 (Tmax 100.4) and had a leukocytosis (WBC 20.3); all other VSS. Fluid cultures from the bile have grown  4+ Citrobacter koseri, 4+ Klebsiella oxytoca, 4+ Enterococcus gallinarum, 4+ Enterococcus faecalis, susceptibilities are pending. Peripheral blood culture from the left hand positive in 1/2 set (1/4 bottles) for Gram negative bacilli, Verigene detected Citrobacter species, no resistance genes detected. Gram positive cocci in pairs & chains identified in both sets (2/4 bottles), Verigene did not detect any bacteria. Due to these findings, tess-operative Zosyn and vancomycin were broadened to imipenem-cilastatin; fluconazole continued. Repeat blood cultures are pending.     Leukocytosis was elevated further on 7/11 (WBC 22.8), now trending down  today (WBC 17.8 on 7/12). Has been afebrile and hemodynamically stable outside of hypertension. No history of VRE faecium.         Active Anti-infective Medications   (From admission, onward)                 Start     Stop    07/12/24 1600  imipenem-cilastatin (PRIMAXIN) 500 mg vial mixture ingredient  1,000 mg of imipenem,   Intravenous,   EVERY 8 HOURS        surgical ppx       --    07/11/24 1600  fluconazole  200 mg,   Intravenous,   100 mL/hr,   EVERY 24 HOURS        Fungal Infection Prophylaxis       07/14/24 9454                  Assessment: Polymicrobial bacteremia (Gram positive cocci in chantell & chains and Citrobacter koseri) secondary to procedural translocation  Patient appears to have translocation of gastrointestinal bacteria to the bloodstream secondary to hepatobiliary procedure vs infection. Suspect that Gram positive cocci in pairs & chains in blood cultures is likely Enterococcus gallinarum given isolation from bile fluid cultures. E. gallinarum is intrinsically resistant to vancomycin and generally susceptible to penicillin and ampicillin. Enterococcus faecalis identified in bile fluid culture is also very susceptible to penicillin and ampicillin, 100% based on our local antibiogram. Citrobacter koseri identified in both blood and bile fluid cultures as well as Klebsiella oxytoca do not harbor chromosomal AmpC beta lactamases, thus can be adequately treated with Zosyn with out concern for development of ampC beta-lactamase. Patient was improving on Zosyn therapy and does not have history of ESBL producing organisms or MDR Pseudomonas to warrant ongoing carbapenem therapy. We recommend deescalating imipenem-cilastatin back to Zosyn which provides coverage against all organisms isolated from blood and bile cultures as well as gastrointestinal anaerobes. Agree with repeating blood cultures given GPC bacteremia which will help determine duration of therapy after clearance. We recommend ID consult in  the next couple of days to coordinate duration of therapy and possible follow up.     Recommendations:  Stop imipenem-cilastatin  Start Zosyn 3.375 mg IV q6h  ID consult in the next coming days to help guide duration of therapy and follow up     Pharmacy took the following actions: Electronic note created, Called/paged provider .    Discussed with ID Staff: Ayush Red MD and Carolyn Maldonado, PharmD, BCIDP  Ty Siva BolañosD Candidate 2025        Vital Signs/Clinical Features:  Vitals         07/10 0700 07/11 0659 07/11 0700  07/12 0659 07/12 0700  07/12 1521   Most Recent      Temp ( F) 97 -  100.4    98 -  99    97.7 -  98.3     98.3 (36.8) 07/12 1137    Pulse 81 -  110    72 -  94    76 -  86     76 07/12 1137    Resp 13 -  34    18 -  20    16 -  18     18 07/12 1137    BP 97/57 -  189/90    134/68 -  163/79    158/74 -  166/73     165/72 07/12 1137    SpO2 (%) 93 -  100    93 -  98    96 -  98     98 07/12 1137            Labs  Estimated Creatinine Clearance: 92.4 mL/min (based on SCr of 0.53 mg/dL).  Recent Labs   Lab Test 07/09/24  1438 07/10/24  1624 07/11/24  0546 07/12/24  0600   CR 0.65 0.57 0.80 0.53       Recent Labs   Lab Test 07/09/24  1438 07/10/24  0848 07/10/24  1158 07/10/24  1302 07/10/24  1404 07/10/24  1624 07/11/24  0546 07/12/24  0600   WBC 11.5*  --   --   --   --  20.3* 22.8* 17.8*   HGB 9.5*   < > 9.4* 10.3* 9.7* 10.7* 9.1* 9.2*   HCT 30.5*  --   --   --   --  33.4* 27.5* 27.8*   MCV 82  --   --   --   --  82 80 79   *  --   --   --   --  492* 345 319    < > = values in this interval not displayed.       Recent Labs   Lab Test 07/09/24  1438 07/10/24  1624 07/11/24  0546 07/12/24  0600   BILITOTAL 0.2 1.3* 0.6 0.4   ALKPHOS 278* 346* 272* 234*   ALBUMIN 3.6 3.2* 3.0* 2.9*   AST 34 3,824* 2,323* 677*   ALT 50 1,836* 1,343* 906*       Recent Labs   Lab Test 07/10/24  1102 07/10/24  1158 07/10/24  1302 07/10/24  1404 07/10/24  2129 07/11/24  0701   LACT 2.3* 1.8 1.0 0.8 1.3 1.2              Culture Results:  7-Day Micro Results       Procedure Component Value Units Date/Time    Blood Culture Hand, Left [[id number]] Collected: 07/12/24 1047    Order Status: Resulted Lab Status: In process Updated: 07/12/24 1052    Specimen: Blood from Hand, Left     Blood Culture Hand, Right [[id number]] Collected: 07/12/24 1043    Order Status: Resulted Lab Status: In process Updated: 07/12/24 1053    Specimen: Blood from Hand, Right     Blood Culture Hand, Right [[id number]]  (Abnormal) Collected: 07/10/24 2343    Order Status: Completed Lab Status: Preliminary result Updated: 07/12/24 0503    Specimen: Blood from Hand, Right      Culture Positive on the 1st day of incubation      Gram positive cocci in pairs and chains     Comment: 1 of 2 bottles       Verigene GP Panel [[id number]]  (Normal) Collected: 07/10/24 2343    Order Status: Completed Lab Status: Final result Updated: 07/12/24 0738    Specimen: Blood from Hand, Right      Staphylococcus species Not Detected     Staphylococcus aureus Not Detected     Staphylococcus epidermidis Not Detected     Staphylococcus lugdunensis Not Detected     Enterococcus faecalis Not Detected     Enterococcus faecium Not Detected     Streptococcus species Not Detected     Streptococcus agalactiae Not Detected     Streptococcus anginosus group Not Detected     Streptococcus pneumoniae Not Detected     Streptococcus pyogenes Not Detected     Listeria species Not Detected    Narrative:      Specimen tested with [employer]ig[employer] multiplex, gram-positive blood culture nucleic acid test for the following targets: Staphylococcus aureus, Staphylococcus epidermidis, Staphylococcus lugdunensis, other Staphylococcus species, Enterococcus faecalis, Enterococcus faecium, Streptococcus species, Streptococcus agalactiae, Streptococcus anginosus group, Streptococcus pneumoniae, Streptococcus pyogenes, Listeria species, mecA (methicillin resistance), and Jean/vanB (vancomycin  resistance).  Final identification and antimicrobial susceptibility testing will be verified by standard methods.      Blood Culture Hand, Left [[id number]]  (Abnormal) Collected: 07/10/24 2332    Order Status: Completed Lab Status: Preliminary result Updated: 07/12/24 1357    Specimen: Blood from Hand, Left      Culture Positive on the 1st day of incubation      Citrobacter koseri     Comment: 1 of 2 bottles         Gram positive cocci in pairs and chains     Comment: 1 of 2 bottles       Verigene GN Panel [[id number]]  (Abnormal) Collected: 07/10/24 2332    Order Status: Completed Lab Status: Final result Updated: 07/12/24 0021    Specimen: Blood from Hand, Left      Acinetobacter species Not Detected     Citrobacter species Detected     Comment: Positive for Citrobacter species by Verigene multiplex nucleic acid test. Final identification and antimicrobial susceptibility testing will be verified by standard methods.        Enterobacter species Not Detected     Proteus species Not Detected     Escherichia coli Not Detected     Klebsiella pneumoniae Not Detected     Klebsiella oxytoca Not Detected     Pseudomonas aeruginosa Not Detected     CTX-M Not Detected     KPC Not Detected     NDM Not Detected     VIM Not Detected     IMP Not Detected     OXA Not Detected    Narrative:      Specimen tested with Verigene multiplex, gram-negative blood culture nucleic acid test for the following targets: Acinetobacter species, Citrobacter species, Enterobacter species, Proteus species, Escherichia coli, Klebsiella pneumoniae, Klebsiella oxytoca, Pseudomonas aeruginosa, and the following resistance markers: CTX-M, KPC, NDM, VIM, IMP and OXA.    Blood Culture Peripheral Blood     Order Status: Canceled Lab Status: No result     Specimen: Peripheral Blood     Blood Culture Line, venous     Order Status: Sent Lab Status: No result     Specimen: Blood from Line, venous     Anaerobic Bacterial Culture Routine [[id number]]  Collected: 07/10/24 1049    Order Status: Completed Lab Status: Preliminary result Updated: 07/12/24 1316    Specimen: Body fluid, unsp from Gallbladder      Culture No anaerobic organisms isolated after 2 days    Gram Stain [[id number]]  (Abnormal) Collected: 07/10/24 1049    Order Status: Completed Lab Status: Final result Updated: 07/10/24 1338    Specimen: Body fluid, unsp from Gallbladder      GS Culture See corresponding culture for results     Gram Stain Result 4+ Gram negative bacilli      4+ Gram positive cocci      1+ WBC seen    Fungal or Yeast Culture Routine [[id number]] Collected: 07/10/24 1049    Order Status: Completed Lab Status: Preliminary result Updated: 07/12/24 1316    Specimen: Body fluid, unsp from Gallbladder      Culture No growth after 2 days    Body fluid, unsp Aerobic Bacterial Culture Routine [[id number]]  (Abnormal) Collected: 07/10/24 1049    Order Status: Completed Lab Status: Preliminary result Updated: 07/12/24 0731    Specimen: Body fluid, unsp from Gallbladder      Culture 4+ Citrobacter koseri      4+ Klebsiella oxytoca      4+ Enterococcus gallinarum      4+ Enterococcus faecalis                                    Imaging: US Abd Hepatic & Portal Vasculature Cmpl    Result Date: 7/10/2024  EXAMINATION: US ABD HEPATIC & PORTAL VASCULATURE CMPL, 7/10/2024 5:43 PM COMPARISON: None. HISTORY: evaluate flow to liver TECHNIQUE:  Grey-scale, color Doppler and spectral flow analysis. FINDINGS: LIVER DOPPLER: Splenic vein:  Not visualized. Extrahepatic portal vein:  Patent continuous antegrade flow, 42 cm/sec. Right portal vein flow is antegrade, measuring 12 cm/sec. Left portal vein flow is antegrade, measuring 22 cm/sec. Inferior vena cava patent with flow toward the heart throughout.. Right, mid, left hepatic veins: Patent with flow towards the inferior vena cava. Extrahepatic hepatic artery: Low resistance waveform with flow towards the liver. 37 cm/sec with resistive index  0.62. Right hepatic artery: 23 cm/sec with resistive index 0.54. Left hepatic artery: 104 cm/sec with resistive index 0.71. Visualized portions of the aorta are unremarkable.     Impression: Splenic vein not visualized. Otherwise patent Doppler exam of the liver. I have personally reviewed the examination and initial interpretation and I agree with the findings. JOAQUÍN CHRISTIAN MD   SYSTEM ID:  [id number]    XR Surgery ERWIN L/T 5 Min Fluoro w Stills    Result Date: 7/10/2024  This exam was marked as non-reportable because it will not be read by a radiologist or a [place] non-radiologist provider.     Echocardiogram Dobutamine Stress    Result Date: 2024  [id number] [facility] [id number]^PARESH^DEANDRE  [facility],[place] [facility] [address] [place] [id number]  Name: CARINE GONZALES MRN: [id number] : 1950 Study Date: 2024 03:08 PM Age: 73 yrs Gender: Female Patient Location: [facility] Reason For Study: Injury of bile duct, initial encounter, Other chest pain Ordering Physician: DEANDRE YODER Referring Physician: DEANDRE YODER Performed By: May Guerra RDCS  BP: 148/69 mmHg ______________________________________________________________________________ ______________________________________________________________________________ Interpretation Summary Normal, low-risk dobutamine echocardiogram without evidence of ischemia. The target heart rate was achieved. Normal biventricular size, thickness, and global systolic function at baseline, LVEF=55-60%. With low-dose dobutamine, LVEF augmented and LV cavity size decreased appropriately. With peak dobutamine, LVEF increased further to >70% and LV cavity size decreased appropriately. No regional wall motion abnormalities at rest or with dobutamine. No angina was elicited. No ECG evidence of ischemia. Normal heart rate and blood pressure response to dobutamine. No  significant valvular abnormalities are noted on screening Doppler exam. The aortic root and visualized ascending aorta are normal. ______________________________________________________________________________ Stress The drug infusion was stopped due to target heart rate achieved. The patient did not exhibit any symptoms during drug infusion. The maximum dose of dobutamine was 30mcg/kg/min. The maximum dose of atropine was 0mg. The maximum dose of metoprolol was 3.0mg. Definity (NDC #59211-736-63) given intravenously. Patient was given 6ml mixture of 1.5ml Definity and 8.5ml saline. 4 ml wasted. Definity Expiration 5/2025 . Definity Lot # 6350 .  Stress Results                                      Maximum Predicted HR:   147 bpm            Target HR: 125 bpm        % Maximum Predicted HR: 93 %                        Stage DurationHeart Rate  BP   Dose                              (mm:ss)   (bpm)                     Baseline  0:00      91    148/69 0.00                       Peak    6:47      137   141/5530.00                       Stress Duration:   6:47 mm:ss                      Maximum Stress HR: 137 bpm  Procedure Dobutamine stress echo with two dimensional color and spectral Doppler performed. Definity (NDC #56217-286) given intravenously. ______________________________________________________________________________ Report approved by: Dr Kwesi Nava 07/09/2024 04:59 PM  ______________________________________________________________________________

## 2024-07-12 NOTE — PLAN OF CARE
Vital signs:  Temp: 98.5  F (36.9  C) Temp src: Oral BP: (!) 151/65 Pulse: 85   Resp: 20 SpO2: 93 %    Time of care: 4754-1116    D: s/p Hepaticojejunostomy, Tamiko En Y    I/A: A0x4, legally blind. NSR on tele. On RA. Severe pain with activity, treated with iv dilaudid twice.   NPO except ice chips. NG to low intermittent suctioning. Intermittent antibiotics. Mancera removed today and voiding. Not passing flatus.   2 MILLI drains to abdomin and 1 biliary drain.     P: Continue to monitor Pt status and report changes to Liver transplant

## 2024-07-12 NOTE — PLAN OF CARE
Transfer  Transferred to: 7A  Via: bed  Reason for transfer: Pt inappropriate for 6C  Family: Aware of transfer, daughter at bedside   Belongings: Sent with pt  Chart: Sent with pt  Medications: Meds from bin sent with pt  Report called to: 7A RN***

## 2024-07-13 LAB
ALBUMIN SERPL BCG-MCNC: 2.8 G/DL (ref 3.5–5.2)
ALP SERPL-CCNC: 210 U/L (ref 40–150)
ALT SERPL W P-5'-P-CCNC: 545 U/L (ref 0–50)
ANION GAP SERPL CALCULATED.3IONS-SCNC: 9 MMOL/L (ref 7–15)
AST SERPL W P-5'-P-CCNC: 216 U/L (ref 0–45)
BILIRUB SERPL-MCNC: 0.4 MG/DL
BUN SERPL-MCNC: 9.3 MG/DL (ref 8–23)
CALCIUM SERPL-MCNC: 8.4 MG/DL (ref 8.8–10.2)
CHLORIDE SERPL-SCNC: 100 MMOL/L (ref 98–107)
CREAT SERPL-MCNC: 0.46 MG/DL (ref 0.51–0.95)
DEPRECATED HCO3 PLAS-SCNC: 21 MMOL/L (ref 22–29)
EGFRCR SERPLBLD CKD-EPI 2021: >90 ML/MIN/1.73M2
ERYTHROCYTE [DISTWIDTH] IN BLOOD BY AUTOMATED COUNT: 17.2 % (ref 10–15)
GLUCOSE BLDC GLUCOMTR-MCNC: 169 MG/DL (ref 70–99)
GLUCOSE BLDC GLUCOMTR-MCNC: 173 MG/DL (ref 70–99)
GLUCOSE BLDC GLUCOMTR-MCNC: 179 MG/DL (ref 70–99)
GLUCOSE BLDC GLUCOMTR-MCNC: 188 MG/DL (ref 70–99)
GLUCOSE BLDC GLUCOMTR-MCNC: 193 MG/DL (ref 70–99)
GLUCOSE BLDC GLUCOMTR-MCNC: 217 MG/DL (ref 70–99)
GLUCOSE BLDC GLUCOMTR-MCNC: 244 MG/DL (ref 70–99)
GLUCOSE BLDC GLUCOMTR-MCNC: 250 MG/DL (ref 70–99)
GLUCOSE SERPL-MCNC: 174 MG/DL (ref 70–99)
HCT VFR BLD AUTO: 27 % (ref 35–47)
HGB BLD-MCNC: 8.5 G/DL (ref 11.7–15.7)
INR PPP: 1.2 (ref 0.85–1.15)
MCH RBC QN AUTO: 25.7 PG (ref 26.5–33)
MCHC RBC AUTO-ENTMCNC: 31.5 G/DL (ref 31.5–36.5)
MCV RBC AUTO: 82 FL (ref 78–100)
PLATELET # BLD AUTO: 293 10E3/UL (ref 150–450)
POTASSIUM SERPL-SCNC: 4.3 MMOL/L (ref 3.4–5.3)
PROT SERPL-MCNC: 5.5 G/DL (ref 6.4–8.3)
RBC # BLD AUTO: 3.31 10E6/UL (ref 3.8–5.2)
SODIUM SERPL-SCNC: 130 MMOL/L (ref 135–145)
WBC # BLD AUTO: 13.7 10E3/UL (ref 4–11)

## 2024-07-13 PROCEDURE — 258N000003 HC RX IP 258 OP 636: Performed by: TRANSPLANT SURGERY

## 2024-07-13 PROCEDURE — 36415 COLL VENOUS BLD VENIPUNCTURE: CPT | Performed by: PHYSICIAN ASSISTANT

## 2024-07-13 PROCEDURE — 85610 PROTHROMBIN TIME: CPT | Performed by: PHYSICIAN ASSISTANT

## 2024-07-13 PROCEDURE — 250N000013 HC RX MED GY IP 250 OP 250 PS 637: Performed by: PHYSICIAN ASSISTANT

## 2024-07-13 PROCEDURE — 120N000011 HC R&B TRANSPLANT UMMC

## 2024-07-13 PROCEDURE — 250N000013 HC RX MED GY IP 250 OP 250 PS 637: Performed by: TRANSPLANT SURGERY

## 2024-07-13 PROCEDURE — 250N000011 HC RX IP 250 OP 636: Performed by: TRANSPLANT SURGERY

## 2024-07-13 PROCEDURE — 87040 BLOOD CULTURE FOR BACTERIA: CPT | Performed by: PHYSICIAN ASSISTANT

## 2024-07-13 PROCEDURE — 80053 COMPREHEN METABOLIC PANEL: CPT | Performed by: PHYSICIAN ASSISTANT

## 2024-07-13 PROCEDURE — 250N000009 HC RX 250: Performed by: STUDENT IN AN ORGANIZED HEALTH CARE EDUCATION/TRAINING PROGRAM

## 2024-07-13 PROCEDURE — 250N000013 HC RX MED GY IP 250 OP 250 PS 637: Performed by: STUDENT IN AN ORGANIZED HEALTH CARE EDUCATION/TRAINING PROGRAM

## 2024-07-13 PROCEDURE — 85027 COMPLETE CBC AUTOMATED: CPT | Performed by: PHYSICIAN ASSISTANT

## 2024-07-13 RX ADMIN — IMIPENEM AND CILASTATIN SODIUM 1000 MG OF IMIPENEM: 500; 500 INJECTION, POWDER, FOR SOLUTION INTRAVENOUS at 09:28

## 2024-07-13 RX ADMIN — IMIPENEM AND CILASTATIN SODIUM 1000 MG OF IMIPENEM: 500; 500 INJECTION, POWDER, FOR SOLUTION INTRAVENOUS at 23:23

## 2024-07-13 RX ADMIN — HEPARIN SODIUM 5000 UNITS: 5000 INJECTION, SOLUTION INTRAVENOUS; SUBCUTANEOUS at 23:23

## 2024-07-13 RX ADMIN — GABAPENTIN 600 MG: 300 CAPSULE ORAL at 09:06

## 2024-07-13 RX ADMIN — HYDROMORPHONE HYDROCHLORIDE 4 MG: 2 TABLET ORAL at 16:27

## 2024-07-13 RX ADMIN — METOPROLOL TARTRATE 5 MG: 5 INJECTION INTRAVENOUS at 23:23

## 2024-07-13 RX ADMIN — HYDROMORPHONE HYDROCHLORIDE 4 MG: 2 TABLET ORAL at 23:45

## 2024-07-13 RX ADMIN — INSULIN ASPART 2 UNITS: 100 INJECTION, SOLUTION INTRAVENOUS; SUBCUTANEOUS at 09:23

## 2024-07-13 RX ADMIN — METOPROLOL TARTRATE 5 MG: 5 INJECTION INTRAVENOUS at 04:37

## 2024-07-13 RX ADMIN — DULOXETINE HYDROCHLORIDE 60 MG: 60 CAPSULE, DELAYED RELEASE ORAL at 09:20

## 2024-07-13 RX ADMIN — HYDROMORPHONE HYDROCHLORIDE 4 MG: 2 TABLET ORAL at 09:08

## 2024-07-13 RX ADMIN — HEPARIN SODIUM 5000 UNITS: 5000 INJECTION, SOLUTION INTRAVENOUS; SUBCUTANEOUS at 09:33

## 2024-07-13 RX ADMIN — POLYETHYLENE GLYCOL 3350 17 G: 17 POWDER, FOR SOLUTION ORAL at 09:09

## 2024-07-13 RX ADMIN — METOPROLOL TARTRATE 5 MG: 5 INJECTION INTRAVENOUS at 11:34

## 2024-07-13 RX ADMIN — INSULIN ASPART 1 UNITS: 100 INJECTION, SOLUTION INTRAVENOUS; SUBCUTANEOUS at 04:37

## 2024-07-13 RX ADMIN — INSULIN ASPART 1 UNITS: 100 INJECTION, SOLUTION INTRAVENOUS; SUBCUTANEOUS at 16:30

## 2024-07-13 RX ADMIN — GABAPENTIN 600 MG: 300 CAPSULE ORAL at 21:20

## 2024-07-13 RX ADMIN — AMITRIPTYLINE HYDROCHLORIDE 10 MG: 10 TABLET, FILM COATED ORAL at 21:21

## 2024-07-13 RX ADMIN — FAMOTIDINE 20 MG: 20 TABLET ORAL at 21:20

## 2024-07-13 RX ADMIN — LISINOPRIL 40 MG: 40 TABLET ORAL at 09:07

## 2024-07-13 RX ADMIN — IMIPENEM AND CILASTATIN SODIUM 1000 MG OF IMIPENEM: 500; 500 INJECTION, POWDER, FOR SOLUTION INTRAVENOUS at 16:35

## 2024-07-13 RX ADMIN — INSULIN ASPART 3 UNITS: 100 INJECTION, SOLUTION INTRAVENOUS; SUBCUTANEOUS at 21:21

## 2024-07-13 RX ADMIN — HEPARIN SODIUM 5000 UNITS: 5000 INJECTION, SOLUTION INTRAVENOUS; SUBCUTANEOUS at 16:32

## 2024-07-13 RX ADMIN — INSULIN ASPART 2 UNITS: 100 INJECTION, SOLUTION INTRAVENOUS; SUBCUTANEOUS at 13:46

## 2024-07-13 RX ADMIN — SENNOSIDES AND DOCUSATE SODIUM 1 TABLET: 50; 8.6 TABLET ORAL at 21:20

## 2024-07-13 RX ADMIN — METOPROLOL TARTRATE 5 MG: 5 INJECTION INTRAVENOUS at 17:49

## 2024-07-13 RX ADMIN — SIMVASTATIN 40 MG: 40 TABLET, FILM COATED ORAL at 21:35

## 2024-07-13 RX ADMIN — FLUCONAZOLE IN SODIUM CHLORIDE 200 MG: 2 INJECTION, SOLUTION INTRAVENOUS at 17:48

## 2024-07-13 RX ADMIN — BISACODYL 10 MG: 10 SUPPOSITORY RECTAL at 18:14

## 2024-07-13 RX ADMIN — FAMOTIDINE 20 MG: 20 TABLET ORAL at 09:07

## 2024-07-13 ASSESSMENT — ACTIVITIES OF DAILY LIVING (ADL)
ADLS_ACUITY_SCORE: 45

## 2024-07-13 NOTE — PLAN OF CARE
"BP (!) 143/70   Pulse 68   Temp 98.5  F (36.9  C)   Resp 18   Ht 1.613 m (5' 3.5\")   Wt 61.9 kg (136 lb 6.4 oz)   SpO2 95%   BMI 23.78 kg/m      2300 - 0730    Hypertensive up to 150's/70's, OVSS on RA. AO x4. Full liquid diet. Ax 1-2 w/ walker. BG Q4 (173, 169). Voids w/o difficulty. LBM 7/12. Legally blind- takes pills by placing them on her lap and she feel and pick them up.     RLQ Biliary drain capped. G tube clamped. MILLI x2- R and L- serosang output from both.     "

## 2024-07-13 NOTE — PLAN OF CARE
"BP (!) 151/65 (BP Location: Left arm)   Pulse 70   Temp 98.5  F (36.9  C) (Oral)   Resp 18   Ht 1.613 m (5' 3.5\")   Wt 61.9 kg (136 lb 6.4 oz)   SpO2 99%   BMI 23.78 kg/m      [id number]  Rocío \"Kimberlyn\" Drew transferred from 6C to 7A s/p hepaticojejunostomy, Tamiko-En-Y, and ARABELLA with Dr Lopez. Hypertensive 150s/60s. OVSS on RA. Continuous Pulsox. A/Ox4, pleasant and cooperative with cares; pt legally blind; pt's daughter Sarah is pt's home PCA and present at bedside. Pt endorsing abdominal/incisional pain--treated with prn PO dilaudid with adequate relief of pain. Denied n/v--on CLD and tolerating well; ADAT. Q4HR BGs with sliding scale coverage. RPIV SL. LPIV patent with TKO for abx (+BC). Bilateral JPs--patent with serosang output. PEG clamped. Biliary drain capped. Midline incision, covered with operative dressing, old drainage noted. Up 1-2 assist with GB/walker; bedside commode ordered. No BMs this evening (did have BM on day shift). Redness noted to pt's sacrum/upper buttocks--sticky note left for WOC consult if needed. Pt's daughter at bedside and supportive with cares. Call light and belongings within reach. Continue POC/notify team as needed.     "

## 2024-07-13 NOTE — PROGRESS NOTES
Admitted/transferred from:     Time of arrival on unit:     2 RN full  skin assessment completed by Citlaly ESTRADA and Adriana CRONIN    Skin assessment findin) RPIV-SL; LPIV-TKO  2) Mildine abdominal incision--covered with operative dressing  3) RJP, LJP--both patent with serosang output  4) R biliary drain--capped  5) Gtube/PEG--clamped  6) Bruising to 3-4th toes on left foot--pt fractured this past march after a fall; boot in room   7) Redness to sacrum/upper buttocks--mepilex encouraged and pt agreeable; placed new mepilex this tamika    Interventions/actions:   --Assess skin/LDAs qshift/PRN  --Pressure injury prevention education reinforced; pt and her daughter verbalized understanding    Will continue to monitor.

## 2024-07-13 NOTE — PLAN OF CARE
Problem: Adult Inpatient Plan of Care  Goal: Absence of Hospital-Acquired Illness or Injury  Outcome: Met  Goal: Optimal Comfort and Wellbeing  Outcome: Progressing   Goal Outcome Evaluation:

## 2024-07-13 NOTE — PROGRESS NOTES
"BP (!) 152/67 (BP Location: Right arm)   Pulse 83   Temp 98.8  F (37.1  C) (Oral)   Resp 16   Ht 1.613 m (5' 3.5\")   Wt 61.1 kg (134 lb 11.2 oz)   SpO2 99%   BMI 23.49 kg/m      Shift: [id number]  Isolation Status: contact  VS: stable on room air, afebrile  Neuro: Aox4  BG: q5h: 188, 193, 217, 179  Labs:    Respiratory: WDL  Cardiac: hypertensive up to 150s systolic  Pain/Nausea: denies nausea, pain worse when moving vs siting still- pain rated 5s all day  PRN: Dilaudid x2; suppository given d/t lack of gas and BMs  Diet: regular  IV Access: PIV x2  Infusion(s): primaxin & fluconazole infusing in L and R PIVs  Lines/Drains: RLQ biliary drain, G tube clamped, R and L MILLI drain outputting serosang fluid   GI/: voiding without difficulty; last stool was 7/12; not passing gas  Mobility: assist of 1-2 with walker  Plan: continue with POC        "

## 2024-07-13 NOTE — PROGRESS NOTES
Transplant Surgery  Inpatient Daily Progress Note  07/13/2024    Assessment & Plan: 73 year old female who developed a stricture of the CBD s/p Cholecystectomy. Patient now s/p Hepaticojejunostomy, Melinda En Y, and ARABELLA on 7/10/24. Surgeon was Dr. Becker.     Cardiorespiratory: Stable   HTN: PTA Lisinopril, Lasix. SBP up to 160s systolic. Will restart home lisinopril.  HLD: PTA fenofibrate  Hematology: Hgb stable, 9-10  GI/Nutrition: Minimal NG tube output. + BM.  Advance diet as tolerated.  Cholangiogram Monday via PTBD  Fluid/Electrolytes: MIVF: discontinue IVF  Replete electrolytes PRN  Endocrine:   DM2: PTA metformin and glimepiride. Hold. Sliding scale insulin q4hrs.   : Mancera removed  Infectious disease: Tmax 99. Culture +Citrobacter, Klebsiella, and Enterococcus gallinarum and Enterococcus faecalis. Also with positive blood cultures. Stopped zosyn/vanco. Started Imipenem-cilastatin. Continue diflucan. Follow up culture results.  Prophylaxis: Heparin subcutaneous, PPI  Activity: OOB to chair. Ambulate TID.  Disposition: Transplant liver surgery primary. Transfer to      DENIS/Fellow/Resident Provider: Aminata Jay PA-C [id number]    Faculty: Alejandro Becker M.D.     __________________________________________________________________  Transplant History: Admitted 7/10/2024 for s/p hepaticojejunostomy, melinda en y, ARABELLA 2/2 CBD s/p cholecystectomy.  N/A, Postoperative day:      Interval History: History is obtained from the patient  Overnight events: Pain control adequate. +BM. Denies SOB or nausea. Getting out of bed    ROS:   A 10-point review of systems was negative except as noted above.    Meds:  Current Facility-Administered Medications   Medication Dose Route Frequency Provider Last Rate Last Admin    [Held by provider] acetaminophen (TYLENOL) tablet 975 mg  975 mg Oral Q8H Connor Lux MD        amitriptyline (ELAVIL) tablet 10 mg  10 mg Oral At Bedtime Alejandro Becker MD   10 mg at 07/12/24  "2346    DULoxetine (CYMBALTA) DR capsule 60 mg  60 mg Oral Daily Alejandro Becker MD   60 mg at 07/13/24 0920    famotidine (PEPCID) tablet 20 mg  20 mg Oral BID Connor Lux MD   20 mg at 07/13/24 0907    fluconazole (DIFLUCAN) intermittent infusion 200 mg  200 mg Intravenous Q24H Alejandro Becker  mL/hr at 07/12/24 1747 200 mg at 07/12/24 1747    gabapentin (NEURONTIN) capsule 600 mg  600 mg Oral BID Alejandro Becker MD   600 mg at 07/13/24 0906    heparin ANTICOAGULANT injection 5,000 Units  5,000 Units Subcutaneous Q8H Alejandro Becker MD   5,000 Units at 07/13/24 0933    imipenem-cilastatin (PRIMAXIN) 1,000 mg of imipenem in sodium chloride 0.9 % 250 mL intermittent infusion  1,000 mg of imipenem Intravenous Q8H Alejandro Becker MD   1,000 mg of imipenem at 07/13/24 0928    insulin aspart (NovoLOG) injection (RAPID ACTING)  1-7 Units Subcutaneous Q4H Gloria Kerr PA-C   2 Units at 07/13/24 0923    lisinopril (ZESTRIL) tablet 40 mg  40 mg Oral Daily Aminata Jay PA-C   40 mg at 07/13/24 0907    metoprolol (LOPRESSOR) injection 5 mg  5 mg Intravenous Q6H Connor Lux MD   5 mg at 07/13/24 0437    polyethylene glycol (MIRALAX) Packet 17 g  17 g Oral Daily Connor Lux MD   17 g at 07/13/24 0909    senna-docusate (SENOKOT-S/PERICOLACE) 8.6-50 MG per tablet 1 tablet  1 tablet Oral BID Connor Lux MD   1 tablet at 07/12/24 2113    simvastatin (ZOCOR) tablet 40 mg  40 mg Oral At Bedtime Gloria Kerr PA-C   40 mg at 07/12/24 2113    sodium chloride (PF) 0.9% PF flush 3 mL  3 mL Intracatheter Q8H Connor Lux MD   3 mL at 07/12/24 8664       Physical Exam:     Admit Weight: 60.8 kg (134 lb 0.6 oz)    Current vitals:   BP (!) 152/67 (BP Location: Right arm)   Pulse 83   Temp 98.8  F (37.1  C) (Oral)   Resp 16   Ht 1.613 m (5' 3.5\")   Wt 61.1 kg (134 lb 11.2 oz)   SpO2 99%   BMI 23.49 kg/m           Vital sign ranges:    Temp:  [97.5  F (36.4 "  C)-98.8  F (37.1  C)] 98.8  F (37.1  C)  Pulse:  [68-83] 83  Resp:  [16-18] 16  BP: (143-165)/(63-72) 152/67  SpO2:  [95 %-99 %] 99 %  Patient Vitals for the past 24 hrs:   BP Temp Temp src Pulse Resp SpO2 Weight   07/13/24 1021 (!) 152/67 98.8  F (37.1  C) Oral 83 16 99 % --   07/13/24 0604 (!) 153/63 98.1  F (36.7  C) Oral -- 18 -- --   07/13/24 0500 -- -- -- -- -- -- 61.1 kg (134 lb 11.2 oz)   07/13/24 0437 (!) 150/66 -- -- 70 -- -- --   07/13/24 0258 (!) 143/70 98.5  F (36.9  C) -- 68 18 95 % --   07/12/24 2346 (!) 151/67 -- -- 75 -- -- --   07/12/24 2040 -- 98.5  F (36.9  C) Oral -- -- -- --   07/12/24 1908 (!) 151/65 98.8  F (37.1  C) Oral 70 -- 99 % --   07/12/24 1634 (!) 151/68 97.5  F (36.4  C) Oral 80 18 96 % --   07/12/24 1137 (!) 165/72 98.3  F (36.8  C) Oral 76 18 98 % --     General Appearance: in no apparent distress.   Skin: normal, dry  Heart: regular rate and rhythm  Lungs: NLB on RA  Abdomen: The abdomen is flat, and  mildly tender, around surgical incision. The wound is covered with surgical dressing. MILLI x 2 serosanguinous output. Biliary drain to gravity drainage- now capped.  : yusuf is present.    Extremities: edema: absent.   Neurologic: awake, alert, and oriented.     Data:   CMP  Recent Labs   Lab 07/13/24  0925 07/13/24  0821 07/13/24  0542 07/12/24  0853 07/12/24  0600 07/10/24  1610 07/10/24  1404 07/10/24  1302   NA  --   --  130*  --  134*   < > 136 135   POTASSIUM  --   --  4.3  --  4.3   < > 4.7 4.6   CHLORIDE  --   --  100  --  104   < >  --   --    CO2  --   --  21*  --  20*   < >  --   --    * 188* 174*   < > 240*   < > 131* 128*   BUN  --   --  9.3  --  15.9   < >  --   --    CR  --   --  0.46*  --  0.53   < >  --   --    GFRESTIMATED  --   --  >90  --  >90   < >  --   --    CHEY  --   --  8.4*  --  8.4*   < >  --   --    ICAW  --   --   --   --   --   --  4.6 4.8   ALBUMIN  --   --  2.8*  --  2.9*   < >  --   --    BILITOTAL  --   --  0.4  --  0.4   < >  --   --     ALKPHOS  --   --  210*  --  234*   < >  --   --    AST  --   --  216*  --  677*   < >  --   --    ALT  --   --  545*  --  906*   < >  --   --     < > = values in this interval not displayed.     CBC  Recent Labs   Lab 07/13/24  0542 07/12/24  0600 07/10/24  0848 07/09/24  1438   HGB 8.5* 9.2*   < > 9.5*   WBC 13.7* 17.8*   < > 11.5*    319   < > 526*   A1C  --   --   --  7.4*    < > = values in this interval not displayed.     COAGS  Recent Labs   Lab 07/13/24  0542 07/12/24  0600 07/11/24  0546 07/10/24  1624   INR 1.20* 1.31*   < > 1.21*   PTT  --   --   --  29    < > = values in this interval not displayed.      Urinalysis  No lab results found.

## 2024-07-14 ENCOUNTER — APPOINTMENT (OUTPATIENT)
Dept: PHYSICAL THERAPY | Facility: CLINIC | Age: 74
DRG: 329 | End: 2024-07-14
Attending: TRANSPLANT SURGERY
Payer: MEDICARE

## 2024-07-14 ENCOUNTER — APPOINTMENT (OUTPATIENT)
Dept: GENERAL RADIOLOGY | Facility: CLINIC | Age: 74
DRG: 329 | End: 2024-07-14
Attending: TRANSPLANT SURGERY
Payer: MEDICARE

## 2024-07-14 ENCOUNTER — APPOINTMENT (OUTPATIENT)
Dept: OCCUPATIONAL THERAPY | Facility: CLINIC | Age: 74
DRG: 329 | End: 2024-07-14
Attending: TRANSPLANT SURGERY
Payer: MEDICARE

## 2024-07-14 LAB
ALBUMIN SERPL BCG-MCNC: 2.8 G/DL (ref 3.5–5.2)
ALP SERPL-CCNC: 222 U/L (ref 40–150)
ALT SERPL W P-5'-P-CCNC: 351 U/L (ref 0–50)
ANION GAP SERPL CALCULATED.3IONS-SCNC: 7 MMOL/L (ref 7–15)
ANION GAP SERPL CALCULATED.3IONS-SCNC: 8 MMOL/L (ref 7–15)
AST SERPL W P-5'-P-CCNC: 83 U/L (ref 0–45)
BACTERIA BLD CULT: ABNORMAL
BILIRUB SERPL-MCNC: 0.3 MG/DL
BUN SERPL-MCNC: 10.3 MG/DL (ref 8–23)
BUN SERPL-MCNC: 10.6 MG/DL (ref 8–23)
CALCIUM SERPL-MCNC: 8.3 MG/DL (ref 8.8–10.2)
CALCIUM SERPL-MCNC: 8.8 MG/DL (ref 8.8–10.2)
CHLORIDE SERPL-SCNC: 100 MMOL/L (ref 98–107)
CHLORIDE SERPL-SCNC: 101 MMOL/L (ref 98–107)
CREAT SERPL-MCNC: 0.52 MG/DL (ref 0.51–0.95)
CREAT SERPL-MCNC: 0.52 MG/DL (ref 0.51–0.95)
DEPRECATED HCO3 PLAS-SCNC: 21 MMOL/L (ref 22–29)
DEPRECATED HCO3 PLAS-SCNC: 21 MMOL/L (ref 22–29)
EGFRCR SERPLBLD CKD-EPI 2021: >90 ML/MIN/1.73M2
EGFRCR SERPLBLD CKD-EPI 2021: >90 ML/MIN/1.73M2
ERYTHROCYTE [DISTWIDTH] IN BLOOD BY AUTOMATED COUNT: 16.7 % (ref 10–15)
ERYTHROCYTE [DISTWIDTH] IN BLOOD BY AUTOMATED COUNT: 17 % (ref 10–15)
GLUCOSE BLDC GLUCOMTR-MCNC: 159 MG/DL (ref 70–99)
GLUCOSE BLDC GLUCOMTR-MCNC: 199 MG/DL (ref 70–99)
GLUCOSE BLDC GLUCOMTR-MCNC: 204 MG/DL (ref 70–99)
GLUCOSE BLDC GLUCOMTR-MCNC: 231 MG/DL (ref 70–99)
GLUCOSE BLDC GLUCOMTR-MCNC: 300 MG/DL (ref 70–99)
GLUCOSE SERPL-MCNC: 176 MG/DL (ref 70–99)
GLUCOSE SERPL-MCNC: 209 MG/DL (ref 70–99)
HCT VFR BLD AUTO: 26.7 % (ref 35–47)
HCT VFR BLD AUTO: 27.9 % (ref 35–47)
HGB BLD-MCNC: 8.6 G/DL (ref 11.7–15.7)
HGB BLD-MCNC: 8.8 G/DL (ref 11.7–15.7)
INR PPP: 1.11 (ref 0.85–1.15)
MAGNESIUM SERPL-MCNC: 1.5 MG/DL (ref 1.7–2.3)
MAGNESIUM SERPL-MCNC: 2.7 MG/DL (ref 1.7–2.3)
MCH RBC QN AUTO: 25.4 PG (ref 26.5–33)
MCH RBC QN AUTO: 25.9 PG (ref 26.5–33)
MCHC RBC AUTO-ENTMCNC: 31.5 G/DL (ref 31.5–36.5)
MCHC RBC AUTO-ENTMCNC: 32.2 G/DL (ref 31.5–36.5)
MCV RBC AUTO: 80 FL (ref 78–100)
MCV RBC AUTO: 81 FL (ref 78–100)
PHOSPHATE SERPL-MCNC: 3.1 MG/DL (ref 2.5–4.5)
PLATELET # BLD AUTO: 293 10E3/UL (ref 150–450)
PLATELET # BLD AUTO: 344 10E3/UL (ref 150–450)
POTASSIUM SERPL-SCNC: 4.1 MMOL/L (ref 3.4–5.3)
POTASSIUM SERPL-SCNC: 4.3 MMOL/L (ref 3.4–5.3)
PROT SERPL-MCNC: 5.8 G/DL (ref 6.4–8.3)
RBC # BLD AUTO: 3.32 10E6/UL (ref 3.8–5.2)
RBC # BLD AUTO: 3.46 10E6/UL (ref 3.8–5.2)
SODIUM SERPL-SCNC: 129 MMOL/L (ref 135–145)
SODIUM SERPL-SCNC: 129 MMOL/L (ref 135–145)
WBC # BLD AUTO: 11.6 10E3/UL (ref 4–11)
WBC # BLD AUTO: 12 10E3/UL (ref 4–11)

## 2024-07-14 PROCEDURE — 250N000013 HC RX MED GY IP 250 OP 250 PS 637

## 2024-07-14 PROCEDURE — 250N000013 HC RX MED GY IP 250 OP 250 PS 637: Performed by: NURSE PRACTITIONER

## 2024-07-14 PROCEDURE — 74018 RADEX ABDOMEN 1 VIEW: CPT | Mod: 26 | Performed by: RADIOLOGY

## 2024-07-14 PROCEDURE — 250N000009 HC RX 250: Performed by: STUDENT IN AN ORGANIZED HEALTH CARE EDUCATION/TRAINING PROGRAM

## 2024-07-14 PROCEDURE — 84100 ASSAY OF PHOSPHORUS: CPT

## 2024-07-14 PROCEDURE — 74018 RADEX ABDOMEN 1 VIEW: CPT | Mod: 77

## 2024-07-14 PROCEDURE — 74018 RADEX ABDOMEN 1 VIEW: CPT

## 2024-07-14 PROCEDURE — 999N000128 HC STATISTIC PERIPHERAL IV START W/O US GUIDANCE

## 2024-07-14 PROCEDURE — 258N000003 HC RX IP 258 OP 636: Performed by: TRANSPLANT SURGERY

## 2024-07-14 PROCEDURE — 83735 ASSAY OF MAGNESIUM: CPT | Performed by: TRANSPLANT SURGERY

## 2024-07-14 PROCEDURE — 97530 THERAPEUTIC ACTIVITIES: CPT | Mod: GP

## 2024-07-14 PROCEDURE — 80053 COMPREHEN METABOLIC PANEL: CPT

## 2024-07-14 PROCEDURE — 97530 THERAPEUTIC ACTIVITIES: CPT | Mod: GO

## 2024-07-14 PROCEDURE — 97116 GAIT TRAINING THERAPY: CPT | Mod: GP

## 2024-07-14 PROCEDURE — 85027 COMPLETE CBC AUTOMATED: CPT | Performed by: PHYSICIAN ASSISTANT

## 2024-07-14 PROCEDURE — 250N000013 HC RX MED GY IP 250 OP 250 PS 637: Performed by: PHYSICIAN ASSISTANT

## 2024-07-14 PROCEDURE — 80048 BASIC METABOLIC PNL TOTAL CA: CPT

## 2024-07-14 PROCEDURE — 83735 ASSAY OF MAGNESIUM: CPT

## 2024-07-14 PROCEDURE — 36415 COLL VENOUS BLD VENIPUNCTURE: CPT

## 2024-07-14 PROCEDURE — 36415 COLL VENOUS BLD VENIPUNCTURE: CPT | Performed by: PHYSICIAN ASSISTANT

## 2024-07-14 PROCEDURE — 85027 COMPLETE CBC AUTOMATED: CPT

## 2024-07-14 PROCEDURE — 250N000011 HC RX IP 250 OP 636: Performed by: TRANSPLANT SURGERY

## 2024-07-14 PROCEDURE — 97535 SELF CARE MNGMENT TRAINING: CPT | Mod: GO

## 2024-07-14 PROCEDURE — 250N000013 HC RX MED GY IP 250 OP 250 PS 637: Performed by: TRANSPLANT SURGERY

## 2024-07-14 PROCEDURE — 120N000011 HC R&B TRANSPLANT UMMC

## 2024-07-14 PROCEDURE — 85610 PROTHROMBIN TIME: CPT | Performed by: PHYSICIAN ASSISTANT

## 2024-07-14 PROCEDURE — 250N000013 HC RX MED GY IP 250 OP 250 PS 637: Performed by: STUDENT IN AN ORGANIZED HEALTH CARE EDUCATION/TRAINING PROGRAM

## 2024-07-14 RX ORDER — METHOCARBAMOL 500 MG/1
500 TABLET, FILM COATED ORAL 3 TIMES DAILY
Status: DISCONTINUED | OUTPATIENT
Start: 2024-07-14 | End: 2024-07-16

## 2024-07-14 RX ORDER — BISACODYL 10 MG
10 SUPPOSITORY, RECTAL RECTAL DAILY
Status: DISCONTINUED | OUTPATIENT
Start: 2024-07-14 | End: 2024-07-18 | Stop reason: HOSPADM

## 2024-07-14 RX ORDER — MAGNESIUM SULFATE HEPTAHYDRATE 40 MG/ML
4 INJECTION, SOLUTION INTRAVENOUS ONCE
Status: COMPLETED | OUTPATIENT
Start: 2024-07-14 | End: 2024-07-14

## 2024-07-14 RX ADMIN — BISACODYL 10 MG: 10 SUPPOSITORY RECTAL at 16:56

## 2024-07-14 RX ADMIN — SENNOSIDES AND DOCUSATE SODIUM 1 TABLET: 50; 8.6 TABLET ORAL at 08:09

## 2024-07-14 RX ADMIN — GABAPENTIN 600 MG: 300 CAPSULE ORAL at 08:08

## 2024-07-14 RX ADMIN — HYDROMORPHONE HYDROCHLORIDE 4 MG: 2 TABLET ORAL at 12:39

## 2024-07-14 RX ADMIN — SIMVASTATIN 40 MG: 40 TABLET, FILM COATED ORAL at 22:38

## 2024-07-14 RX ADMIN — INSULIN ASPART 4 UNITS: 100 INJECTION, SOLUTION INTRAVENOUS; SUBCUTANEOUS at 10:32

## 2024-07-14 RX ADMIN — MAGNESIUM SULFATE IN WATER 4 G: 40 INJECTION, SOLUTION INTRAVENOUS at 02:46

## 2024-07-14 RX ADMIN — FAMOTIDINE 20 MG: 20 TABLET ORAL at 20:56

## 2024-07-14 RX ADMIN — INSULIN ASPART 2 UNITS: 100 INJECTION, SOLUTION INTRAVENOUS; SUBCUTANEOUS at 22:38

## 2024-07-14 RX ADMIN — POLYETHYLENE GLYCOL 3350 17 G: 17 POWDER, FOR SOLUTION ORAL at 08:12

## 2024-07-14 RX ADMIN — DULOXETINE HYDROCHLORIDE 60 MG: 60 CAPSULE, DELAYED RELEASE ORAL at 08:08

## 2024-07-14 RX ADMIN — FAMOTIDINE 20 MG: 20 TABLET ORAL at 08:08

## 2024-07-14 RX ADMIN — SENNOSIDES AND DOCUSATE SODIUM 1 TABLET: 50; 8.6 TABLET ORAL at 20:56

## 2024-07-14 RX ADMIN — BISACODYL 10 MG: 10 SUPPOSITORY RECTAL at 08:45

## 2024-07-14 RX ADMIN — INSULIN ASPART 2 UNITS: 100 INJECTION, SOLUTION INTRAVENOUS; SUBCUTANEOUS at 02:46

## 2024-07-14 RX ADMIN — AMITRIPTYLINE HYDROCHLORIDE 10 MG: 10 TABLET, FILM COATED ORAL at 22:38

## 2024-07-14 RX ADMIN — METHOCARBAMOL 500 MG: 500 TABLET ORAL at 14:23

## 2024-07-14 RX ADMIN — HEPARIN SODIUM 5000 UNITS: 5000 INJECTION, SOLUTION INTRAVENOUS; SUBCUTANEOUS at 16:41

## 2024-07-14 RX ADMIN — SIMETHICONE 226 ML: 125 CAPSULE, LIQUID FILLED ORAL at 22:44

## 2024-07-14 RX ADMIN — METOPROLOL TARTRATE 5 MG: 5 INJECTION INTRAVENOUS at 11:14

## 2024-07-14 RX ADMIN — INSULIN ASPART 2 UNITS: 100 INJECTION, SOLUTION INTRAVENOUS; SUBCUTANEOUS at 17:34

## 2024-07-14 RX ADMIN — GABAPENTIN 600 MG: 300 CAPSULE ORAL at 20:56

## 2024-07-14 RX ADMIN — IMIPENEM AND CILASTATIN SODIUM 1000 MG OF IMIPENEM: 500; 500 INJECTION, POWDER, FOR SOLUTION INTRAVENOUS at 16:41

## 2024-07-14 RX ADMIN — METOPROLOL TARTRATE 5 MG: 5 INJECTION INTRAVENOUS at 17:36

## 2024-07-14 RX ADMIN — METHOCARBAMOL 500 MG: 500 TABLET ORAL at 20:56

## 2024-07-14 RX ADMIN — LISINOPRIL 40 MG: 40 TABLET ORAL at 08:09

## 2024-07-14 RX ADMIN — METOPROLOL TARTRATE 5 MG: 5 INJECTION INTRAVENOUS at 22:38

## 2024-07-14 RX ADMIN — INSULIN ASPART 1 UNITS: 100 INJECTION, SOLUTION INTRAVENOUS; SUBCUTANEOUS at 06:32

## 2024-07-14 RX ADMIN — METOPROLOL TARTRATE 5 MG: 5 INJECTION INTRAVENOUS at 06:21

## 2024-07-14 RX ADMIN — HEPARIN SODIUM 5000 UNITS: 5000 INJECTION, SOLUTION INTRAVENOUS; SUBCUTANEOUS at 23:03

## 2024-07-14 RX ADMIN — HEPARIN SODIUM 5000 UNITS: 5000 INJECTION, SOLUTION INTRAVENOUS; SUBCUTANEOUS at 08:16

## 2024-07-14 RX ADMIN — IMIPENEM AND CILASTATIN SODIUM 1000 MG OF IMIPENEM: 500; 500 INJECTION, POWDER, FOR SOLUTION INTRAVENOUS at 08:19

## 2024-07-14 ASSESSMENT — ACTIVITIES OF DAILY LIVING (ADL)
ADLS_ACUITY_SCORE: 45

## 2024-07-14 NOTE — PLAN OF CARE
"  Problem: Adult Inpatient Plan of Care  Goal: Plan of Care Review  Description: The Plan of Care Review/Shift note should be completed every shift.  The Outcome Evaluation is a brief statement about your assessment that the patient is improving, declining, or no change.  This information will be displayed automatically on your shift  note.  Outcome: Progressing  Goal: Patient-Specific Goal (Individualized)  Description: You can add care plan individualizations to a care plan. Examples of Individualization might be:  \"Parent requests to be called daily at 9am for status\", \"I have a hard time hearing out of my right ear\", or \"Do not touch me to wake me up as it startles  me\".  Outcome: Progressing  Goal: Optimal Comfort and Wellbeing  Outcome: Progressing  Goal: Readiness for Transition of Care  Outcome: Progressing     Problem: Risk for Delirium  Goal: Optimal Coping  Outcome: Progressing  Goal: Improved Attention and Thought Clarity  Outcome: Progressing  Goal: Improved Sleep  Outcome: Progressing  Intervention: Promote Sleep  Recent Flowsheet Documentation  Taken 7/14/2024 1218 by Jolie Luna  Sleep/Rest Enhancement:   awakenings minimized   noise level reduced   room darkened     Problem: Risk for Delirium  Goal: Improved Behavioral Control  Outcome: Met     Problem: Adult Inpatient Plan of Care  Goal: Absence of Hospital-Acquired Illness or Injury  Intervention: Prevent Infection  Recent Flowsheet Documentation  Taken 7/14/2024 1811 by Jolie Luna  Infection Prevention:   hand hygiene promoted   rest/sleep promoted   single patient room provided  Taken 7/14/2024 0944 by Jolie Luna  Infection Prevention:   hand hygiene promoted   rest/sleep promoted   single patient room provided   Goal Outcome Evaluation:                        "

## 2024-07-14 NOTE — PLAN OF CARE
"/60 (BP Location: Left arm)   Pulse 63   Temp 97.7  F (36.5  C) (Oral)   Resp 16   Ht 1.613 m (5' 3.5\")   Wt 61.1 kg (134 lb 11.2 oz)   SpO2 98%   BMI 23.49 kg/m      2300 - 0730    Hypertensive up to 150's/70's, OVSS on RA. AO x4. Regular diet. Ax 1-2 w/ walker. BG Q4 (244, 204, 159). Voids w/o difficulty. LBM 7/11. Legally blind- takes pills by placing them on her lap and she feel and pick them up.     RLQ Biliary drain capped. G tube clamped. MILLI x2- R and L- serosang output from both.     C/o abd pain, abd xray unremarkable. Pt received suppository, miralax, and senna w/ no BM but passing gas. Tap water enema given.     "

## 2024-07-14 NOTE — PROGRESS NOTES
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 Patient now s/p Hepaticojejunostomy, Melinda En Y, and ARABELLA on 7/10/24. Surgeon was Dr. Becker.     Cardiorespiratory: Stable   HTN: PTA Lisinopril, Lasix. SBP up to 140-150s.  HLD: PTA fenofibrate  Hematology: Hgb stable, 8-9  GI/Nutrition: NGT removed  Tolerating Regular diet.  Cholangiogram Monday via PTBD  Constipation: AXR Large right colonic stool burden. Bowel regimen senna BID, Daily dulcolax. Tap water enema today  Fluid/Electrolytes: MIVF: Discontinue IVF  Replete electrolytes PRN  Endocrine:   DM2: PTA metformin and glimepiride. Hold. Sliding scale AC& HS.with increase po intake.   : voiding  Infectious disease: Afebrile, WBC 12. . Culture GB +Citrobacter, Klebsiella, and Enterococcus gallinarum and Enterococcus faecalis.   Bacteremia: +Citrobacter,  and Enterococcus faecalis  Stopped zosyn/vanco. Started Imipenem-cilastatin. Continue diflucan.    Consult ID  Prophylaxis: Heparin subcutaneous, PPI  Activity: OOB to chair. Ambulate TID.  Disposition:  7A     DENIS/Fellow/Resident Provider: Rama Galo NP      Faculty: .Anne Mei M.D.      __________________________________________________________________  Transplant History: Admitted 7/10/2024 for s/p hepaticojejunostomy, melinda en y, ARABELLA 2/2 CBD s/p cholecystectomy.  N/A, Postoperative day:      Interval History: History is obtained from the patient  Overnight events: Advanced to regular diet. No N/V. +BM, small. Reporting increased abd pain overnight, AXR- Denies SOB or nausea. Getting out of bed    ROS:   A 10-point review of systems was negative except as noted above.    Meds:  Current Facility-Administered Medications   Medication Dose Route Frequency Provider Last Rate Last Admin    amitriptyline (ELAVIL) tablet 10 mg  10 mg Oral At Bedtime Rosita  "MD Alejandro   10 mg at 07/13/24 2121    bisacodyl (DULCOLAX) suppository 10 mg  10 mg Rectal Daily Brigette Gill MD   10 mg at 07/14/24 0845    DULoxetine (CYMBALTA) DR capsule 60 mg  60 mg Oral Daily Alejandro Becker MD   60 mg at 07/14/24 0808    famotidine (PEPCID) tablet 20 mg  20 mg Oral BID Connor Lux MD   20 mg at 07/14/24 0808    gabapentin (NEURONTIN) capsule 600 mg  600 mg Oral BID Alejandro Becker MD   600 mg at 07/14/24 0808    heparin ANTICOAGULANT injection 5,000 Units  5,000 Units Subcutaneous Q8H Alejandro Becker MD   5,000 Units at 07/14/24 0816    imipenem-cilastatin (PRIMAXIN) 1,000 mg of imipenem in sodium chloride 0.9 % 250 mL intermittent infusion  1,000 mg of imipenem Intravenous Q8H Alejandro Becker MD   1,000 mg of imipenem at 07/14/24 0819    insulin aspart (NovoLOG) injection (RAPID ACTING)  1-7 Units Subcutaneous Q4H Gloria Kerr PA-C   4 Units at 07/14/24 1032    lisinopril (ZESTRIL) tablet 40 mg  40 mg Oral Daily Aminata Jay PA-C   40 mg at 07/14/24 0809    methocarbamol (ROBAXIN) tablet 500 mg  500 mg Oral TID Rama Galo NP   500 mg at 07/14/24 1423    metoprolol (LOPRESSOR) injection 5 mg  5 mg Intravenous Q6H Connor Lux MD   5 mg at 07/14/24 1114    polyethylene glycol (MIRALAX) Packet 17 g  17 g Oral Daily Connor Lux MD   17 g at 07/14/24 0812    senna-docusate (SENOKOT-S/PERICOLACE) 8.6-50 MG per tablet 1 tablet  1 tablet Oral BID Connor Lux MD   1 tablet at 07/14/24 0809    simvastatin (ZOCOR) tablet 40 mg  40 mg Oral At Bedtime Gloria Kerr PA-C   40 mg at 07/13/24 2135    sodium chloride (PF) 0.9% PF flush 3 mL  3 mL Intracatheter Q8H Connor Lux MD   3 mL at 07/14/24 1120       Physical Exam:     Admit Weight: 60.8 kg (134 lb 0.6 oz)    Current vitals:   BP (!) 155/69 (BP Location: Left arm)   Pulse 80   Temp 98.1  F (36.7  C) (Oral)   Resp 16   Ht 1.613 m (5' 3.5\")   Wt 61.1 kg (134 lb 12.8 " oz)   SpO2 100%   BMI 23.50 kg/m           Vital sign ranges:    Temp:  [97.4  F (36.3  C)-98.7  F (37.1  C)] 98.1  F (36.7  C)  Pulse:  [58-96] 80  Resp:  [16] 16  BP: (128-159)/(58-78) 155/69  SpO2:  [97 %-100 %] 100 %  Patient Vitals for the past 24 hrs:   BP Temp Temp src Pulse Resp SpO2 Weight   07/14/24 1329 (!) 155/69 98.1  F (36.7  C) Oral 80 16 100 % --   07/14/24 1228 (!) 153/73 -- -- 96 -- -- --   07/14/24 1114 (!) 144/65 -- -- 89 -- -- --   07/14/24 0956 138/58 97.7  F (36.5  C) Oral 58 16 98 % --   07/14/24 0740 -- -- -- -- -- -- 61.1 kg (134 lb 12.8 oz)   07/14/24 0621 -- -- -- 70 -- -- --   07/14/24 0618 (!) 147/62 97.4  F (36.3  C) Axillary 67 16 100 % --   07/14/24 0121 128/60 97.7  F (36.5  C) Oral 63 16 98 % --   07/13/24 2323 (!) 159/78 -- -- 81 -- -- --   07/13/24 2112 (!) 143/64 98.7  F (37.1  C) Oral 71 16 97 % --   07/13/24 1749 135/60 -- -- 80 -- -- --   07/13/24 1733 (!) 148/67 98.4  F (36.9  C) Oral 84 16 99 % --     General Appearance: in no apparent distress.   Skin: normal, dry  Heart: regular rate and rhythm  Lungs: NLB on RA  Abdomen: The abdomen is flat, and  mildly tender to right abdomen. The wound is cdi to midline with staples.. MILLI x 2 serosanguinous output. Biliary drain capped.  G/J tube capped.  : voiding  Extremities: edema: absent.   Neurologic: awake, alert, and oriented.     Data:   CMP  Recent Labs   Lab 07/14/24  1018 07/14/24  0631 07/14/24  0618 07/14/24  0248 07/14/24  0104 07/13/24  0821 07/13/24  0542 07/10/24  1610 07/10/24  1404 07/10/24  1302   NA  --   --  129*  --  129*  --  130*   < > 136 135   POTASSIUM  --   --  4.1  --  4.3  --  4.3   < > 4.7 4.6   CHLORIDE  --   --  100  --  101  --  100   < >  --   --    CO2  --   --  21*  --  21*  --  21*   < >  --   --    * 159* 176*   < > 209*   < > 174*   < > 131* 128*   BUN  --   --  10.3  --  10.6  --  9.3   < >  --   --    CR  --   --  0.52  --  0.52  --  0.46*   < >  --   --    GFRESTIMATED  --   --   >90  --  >90  --  >90   < >  --   --    CHEY  --   --  8.8  --  8.3*  --  8.4*   < >  --   --    ICAW  --   --   --   --   --   --   --   --  4.6 4.8   MAG  --   --  2.7*  --  1.5*  --   --   --   --   --    PHOS  --   --   --   --  3.1  --   --   --   --   --    ALBUMIN  --   --  2.8*  --   --   --  2.8*   < >  --   --    BILITOTAL  --   --  0.3  --   --   --  0.4   < >  --   --    ALKPHOS  --   --  222*  --   --   --  210*   < >  --   --    AST  --   --  83*  --   --   --  216*   < >  --   --    ALT  --   --  351*  --   --   --  545*   < >  --   --     < > = values in this interval not displayed.     CBC  Recent Labs   Lab 07/14/24  0618 07/14/24  0104 07/10/24  0848 07/09/24  1438   HGB 8.6* 8.8*   < > 9.5*   WBC 12.0* 11.6*   < > 11.5*    293   < > 526*   A1C  --   --   --  7.4*    < > = values in this interval not displayed.     COAGS  Recent Labs   Lab 07/14/24  0618 07/13/24  0542 07/11/24  0546 07/10/24  1624   INR 1.11 1.20*   < > 1.21*   PTT  --   --   --  29    < > = values in this interval not displayed.      Urinalysis  No lab results found.

## 2024-07-14 NOTE — PROGRESS NOTES
"BP (!) 144/65   Pulse 89   Temp 97.7  F (36.5  C) (Oral)   Resp 16   Ht 1.613 m (5' 3.5\")   Wt 61.1 kg (134 lb 12.8 oz)   SpO2 98%   BMI 23.50 kg/m      Shift: 9671-1517  Isolation Status: contact  VS: stable on room air, afebrile  Neuro: Aox4  BG: ACHS; 199  Labs: ;  changed from q4h to ACHS   Respiratory: WDL  Cardiac: HTN: systolic up to 155  Pain/Nausea: denies nausea, pain rated higher when moving; well controlled  PRN: suppository x1; dilaudid x1  Diet: regular  IV Access: R PIV  Infusion(s): TKO  Lines/Drains:  RLQ biliary drain clamped, G tube clamped, R and L MILLI drain outputting serosang fluid   GI/: pain in abdominal area; abd xray unremarkable & scheduled suppository was ordered for mornings. Voiding spontaneously and without difficulty. X2 Bms 7/14.  Mobility: asisst of 1 w/ walker  Plan: continue with POC; continue to take measures to avoid constipation   "

## 2024-07-14 NOTE — PROGRESS NOTES
Called to bedside given abdominal pain and no BM since Thursday. Patient received suppository this evening, miralax, and senna. Went to assess bedside.     Patient's abdomen soft, not distended, tender to palpation in lower right and lower left quadrants. Tender to percussion. Not guarding. Incision sites clean, dry, intact with no strikethrough. Drains in place with serosanguineous output.     Ordered KUB to assess for ileus or SBO. KUB showed nonobstructive bowel gas pattern. Per fellow, ordered daily suppository. Ordered CBC, BMP, Mg, and Phos. Repleted magnesium.     Brigette Gill MD  General Surgery, PGY-1

## 2024-07-15 ENCOUNTER — APPOINTMENT (OUTPATIENT)
Dept: PHYSICAL THERAPY | Facility: CLINIC | Age: 74
DRG: 329 | End: 2024-07-15
Attending: TRANSPLANT SURGERY
Payer: MEDICARE

## 2024-07-15 ENCOUNTER — APPOINTMENT (OUTPATIENT)
Dept: OCCUPATIONAL THERAPY | Facility: CLINIC | Age: 74
DRG: 329 | End: 2024-07-15
Attending: TRANSPLANT SURGERY
Payer: MEDICARE

## 2024-07-15 ENCOUNTER — HOME INFUSION (PRE-WILLOW HOME INFUSION) (OUTPATIENT)
Dept: PHARMACY | Facility: CLINIC | Age: 74
End: 2024-07-15
Payer: MEDICARE

## 2024-07-15 LAB
ALBUMIN SERPL BCG-MCNC: 2.8 G/DL (ref 3.5–5.2)
ALP SERPL-CCNC: 205 U/L (ref 40–150)
ALT SERPL W P-5'-P-CCNC: 217 U/L (ref 0–50)
ANION GAP SERPL CALCULATED.3IONS-SCNC: 9 MMOL/L (ref 7–15)
AST SERPL W P-5'-P-CCNC: 45 U/L (ref 0–45)
BILIRUB SERPL-MCNC: 0.3 MG/DL
BUN SERPL-MCNC: 9.1 MG/DL (ref 8–23)
CALCIUM SERPL-MCNC: 8.4 MG/DL (ref 8.8–10.2)
CHLORIDE SERPL-SCNC: 98 MMOL/L (ref 98–107)
CREAT SERPL-MCNC: 0.54 MG/DL (ref 0.51–0.95)
DEPRECATED HCO3 PLAS-SCNC: 23 MMOL/L (ref 22–29)
EGFRCR SERPLBLD CKD-EPI 2021: >90 ML/MIN/1.73M2
ERYTHROCYTE [DISTWIDTH] IN BLOOD BY AUTOMATED COUNT: 16.6 % (ref 10–15)
GLUCOSE BLDC GLUCOMTR-MCNC: 171 MG/DL (ref 70–99)
GLUCOSE BLDC GLUCOMTR-MCNC: 184 MG/DL (ref 70–99)
GLUCOSE BLDC GLUCOMTR-MCNC: 197 MG/DL (ref 70–99)
GLUCOSE BLDC GLUCOMTR-MCNC: 232 MG/DL (ref 70–99)
GLUCOSE BLDC GLUCOMTR-MCNC: 267 MG/DL (ref 70–99)
GLUCOSE SERPL-MCNC: 185 MG/DL (ref 70–99)
HCT VFR BLD AUTO: 28.7 % (ref 35–47)
HGB BLD-MCNC: 9.3 G/DL (ref 11.7–15.7)
INR PPP: 1.07 (ref 0.85–1.15)
MAGNESIUM SERPL-MCNC: 1.9 MG/DL (ref 1.7–2.3)
MCH RBC QN AUTO: 25.8 PG (ref 26.5–33)
MCHC RBC AUTO-ENTMCNC: 32.4 G/DL (ref 31.5–36.5)
MCV RBC AUTO: 80 FL (ref 78–100)
PHOSPHATE SERPL-MCNC: 3.5 MG/DL (ref 2.5–4.5)
PLATELET # BLD AUTO: 356 10E3/UL (ref 150–450)
POTASSIUM SERPL-SCNC: 4 MMOL/L (ref 3.4–5.3)
PROT SERPL-MCNC: 5.7 G/DL (ref 6.4–8.3)
RBC # BLD AUTO: 3.61 10E6/UL (ref 3.8–5.2)
SODIUM SERPL-SCNC: 130 MMOL/L (ref 135–145)
WBC # BLD AUTO: 11.6 10E3/UL (ref 4–11)

## 2024-07-15 PROCEDURE — 120N000011 HC R&B TRANSPLANT UMMC

## 2024-07-15 PROCEDURE — 36415 COLL VENOUS BLD VENIPUNCTURE: CPT | Performed by: PHYSICIAN ASSISTANT

## 2024-07-15 PROCEDURE — 250N000011 HC RX IP 250 OP 636: Performed by: PHYSICIAN ASSISTANT

## 2024-07-15 PROCEDURE — 250N000013 HC RX MED GY IP 250 OP 250 PS 637: Performed by: STUDENT IN AN ORGANIZED HEALTH CARE EDUCATION/TRAINING PROGRAM

## 2024-07-15 PROCEDURE — 80053 COMPREHEN METABOLIC PANEL: CPT | Performed by: PHYSICIAN ASSISTANT

## 2024-07-15 PROCEDURE — 97530 THERAPEUTIC ACTIVITIES: CPT | Mod: GO

## 2024-07-15 PROCEDURE — 250N000009 HC RX 250: Performed by: STUDENT IN AN ORGANIZED HEALTH CARE EDUCATION/TRAINING PROGRAM

## 2024-07-15 PROCEDURE — 97110 THERAPEUTIC EXERCISES: CPT | Mod: GP

## 2024-07-15 PROCEDURE — 97116 GAIT TRAINING THERAPY: CPT | Mod: GP

## 2024-07-15 PROCEDURE — 250N000013 HC RX MED GY IP 250 OP 250 PS 637: Performed by: NURSE PRACTITIONER

## 2024-07-15 PROCEDURE — 258N000003 HC RX IP 258 OP 636: Performed by: TRANSPLANT SURGERY

## 2024-07-15 PROCEDURE — 250N000011 HC RX IP 250 OP 636: Performed by: TRANSPLANT SURGERY

## 2024-07-15 PROCEDURE — 97535 SELF CARE MNGMENT TRAINING: CPT | Mod: GO

## 2024-07-15 PROCEDURE — 83735 ASSAY OF MAGNESIUM: CPT | Performed by: TRANSPLANT SURGERY

## 2024-07-15 PROCEDURE — 99222 1ST HOSP IP/OBS MODERATE 55: CPT | Performed by: INTERNAL MEDICINE

## 2024-07-15 PROCEDURE — 250N000013 HC RX MED GY IP 250 OP 250 PS 637: Performed by: PHYSICIAN ASSISTANT

## 2024-07-15 PROCEDURE — 85610 PROTHROMBIN TIME: CPT | Performed by: PHYSICIAN ASSISTANT

## 2024-07-15 PROCEDURE — 99024 POSTOP FOLLOW-UP VISIT: CPT | Performed by: TRANSPLANT SURGERY

## 2024-07-15 PROCEDURE — 97530 THERAPEUTIC ACTIVITIES: CPT | Mod: GP

## 2024-07-15 PROCEDURE — 84100 ASSAY OF PHOSPHORUS: CPT | Performed by: TRANSPLANT SURGERY

## 2024-07-15 PROCEDURE — 250N000013 HC RX MED GY IP 250 OP 250 PS 637

## 2024-07-15 PROCEDURE — 85027 COMPLETE CBC AUTOMATED: CPT | Performed by: PHYSICIAN ASSISTANT

## 2024-07-15 PROCEDURE — 250N000013 HC RX MED GY IP 250 OP 250 PS 637: Performed by: TRANSPLANT SURGERY

## 2024-07-15 RX ORDER — PIPERACILLIN SODIUM, TAZOBACTAM SODIUM 3; .375 G/15ML; G/15ML
3.38 INJECTION, POWDER, LYOPHILIZED, FOR SOLUTION INTRAVENOUS EVERY 6 HOURS
Status: COMPLETED | OUTPATIENT
Start: 2024-07-15 | End: 2024-07-17

## 2024-07-15 RX ORDER — PIPERACILLIN SODIUM, TAZOBACTAM SODIUM 3; .375 G/15ML; G/15ML
3.38 INJECTION, POWDER, LYOPHILIZED, FOR SOLUTION INTRAVENOUS EVERY 6 HOURS
Status: DISCONTINUED | OUTPATIENT
Start: 2024-07-15 | End: 2024-07-15

## 2024-07-15 RX ORDER — SIMETHICONE 80 MG
80 TABLET,CHEWABLE ORAL ONCE
Status: COMPLETED | OUTPATIENT
Start: 2024-07-15 | End: 2024-07-15

## 2024-07-15 RX ADMIN — FAMOTIDINE 20 MG: 20 TABLET ORAL at 20:02

## 2024-07-15 RX ADMIN — METHOCARBAMOL 500 MG: 500 TABLET ORAL at 22:04

## 2024-07-15 RX ADMIN — MAGNESIUM HYDROXIDE 30 ML: 400 SUSPENSION ORAL at 02:28

## 2024-07-15 RX ADMIN — INSULIN ASPART 1 UNITS: 100 INJECTION, SOLUTION INTRAVENOUS; SUBCUTANEOUS at 14:47

## 2024-07-15 RX ADMIN — DULOXETINE HYDROCHLORIDE 60 MG: 60 CAPSULE, DELAYED RELEASE ORAL at 09:17

## 2024-07-15 RX ADMIN — METOPROLOL TARTRATE 5 MG: 5 INJECTION INTRAVENOUS at 17:03

## 2024-07-15 RX ADMIN — POLYETHYLENE GLYCOL 3350 17 G: 17 POWDER, FOR SOLUTION ORAL at 09:17

## 2024-07-15 RX ADMIN — LISINOPRIL 40 MG: 40 TABLET ORAL at 09:15

## 2024-07-15 RX ADMIN — SIMVASTATIN 40 MG: 40 TABLET, FILM COATED ORAL at 22:04

## 2024-07-15 RX ADMIN — AMITRIPTYLINE HYDROCHLORIDE 10 MG: 10 TABLET, FILM COATED ORAL at 22:04

## 2024-07-15 RX ADMIN — GABAPENTIN 600 MG: 300 CAPSULE ORAL at 20:01

## 2024-07-15 RX ADMIN — PIPERACILLIN AND TAZOBACTAM 3.38 G: 3; .375 INJECTION, POWDER, FOR SOLUTION INTRAVENOUS at 22:06

## 2024-07-15 RX ADMIN — INSULIN ASPART 2 UNITS: 100 INJECTION, SOLUTION INTRAVENOUS; SUBCUTANEOUS at 19:30

## 2024-07-15 RX ADMIN — INSULIN ASPART 2 UNITS: 100 INJECTION, SOLUTION INTRAVENOUS; SUBCUTANEOUS at 01:27

## 2024-07-15 RX ADMIN — METHOCARBAMOL 500 MG: 500 TABLET ORAL at 09:17

## 2024-07-15 RX ADMIN — HYDROMORPHONE HYDROCHLORIDE 4 MG: 2 TABLET ORAL at 01:50

## 2024-07-15 RX ADMIN — PIPERACILLIN AND TAZOBACTAM 3.38 G: 3; .375 INJECTION, POWDER, FOR SOLUTION INTRAVENOUS at 17:12

## 2024-07-15 RX ADMIN — SENNOSIDES AND DOCUSATE SODIUM 1 TABLET: 50; 8.6 TABLET ORAL at 09:17

## 2024-07-15 RX ADMIN — INSULIN ASPART 3 UNITS: 100 INJECTION, SOLUTION INTRAVENOUS; SUBCUTANEOUS at 22:06

## 2024-07-15 RX ADMIN — IMIPENEM AND CILASTATIN SODIUM 1000 MG OF IMIPENEM: 500; 500 INJECTION, POWDER, FOR SOLUTION INTRAVENOUS at 00:37

## 2024-07-15 RX ADMIN — FAMOTIDINE 20 MG: 20 TABLET ORAL at 09:20

## 2024-07-15 RX ADMIN — BISACODYL 10 MG: 10 SUPPOSITORY RECTAL at 09:39

## 2024-07-15 RX ADMIN — IMIPENEM AND CILASTATIN SODIUM 1000 MG OF IMIPENEM: 500; 500 INJECTION, POWDER, FOR SOLUTION INTRAVENOUS at 09:07

## 2024-07-15 RX ADMIN — SENNOSIDES AND DOCUSATE SODIUM 1 TABLET: 50; 8.6 TABLET ORAL at 20:02

## 2024-07-15 RX ADMIN — SIMETHICONE 80 MG: 80 TABLET, CHEWABLE ORAL at 03:15

## 2024-07-15 RX ADMIN — METOPROLOL TARTRATE 5 MG: 5 INJECTION INTRAVENOUS at 12:46

## 2024-07-15 RX ADMIN — METHOCARBAMOL 500 MG: 500 TABLET ORAL at 14:47

## 2024-07-15 RX ADMIN — METOPROLOL TARTRATE 5 MG: 5 INJECTION INTRAVENOUS at 05:58

## 2024-07-15 RX ADMIN — GABAPENTIN 600 MG: 300 CAPSULE ORAL at 09:17

## 2024-07-15 RX ADMIN — METOPROLOL TARTRATE 5 MG: 5 INJECTION INTRAVENOUS at 22:06

## 2024-07-15 RX ADMIN — HEPARIN SODIUM 5000 UNITS: 5000 INJECTION, SOLUTION INTRAVENOUS; SUBCUTANEOUS at 17:24

## 2024-07-15 RX ADMIN — HEPARIN SODIUM 5000 UNITS: 5000 INJECTION, SOLUTION INTRAVENOUS; SUBCUTANEOUS at 09:17

## 2024-07-15 RX ADMIN — INSULIN ASPART 1 UNITS: 100 INJECTION, SOLUTION INTRAVENOUS; SUBCUTANEOUS at 09:50

## 2024-07-15 ASSESSMENT — ACTIVITIES OF DAILY LIVING (ADL)
ADLS_ACUITY_SCORE: 41
ADLS_ACUITY_SCORE: 41
ADLS_ACUITY_SCORE: 42
ADLS_ACUITY_SCORE: 45
ADLS_ACUITY_SCORE: 41
ADLS_ACUITY_SCORE: 45
ADLS_ACUITY_SCORE: 42
ADLS_ACUITY_SCORE: 45
ADLS_ACUITY_SCORE: 41
ADLS_ACUITY_SCORE: 42
ADLS_ACUITY_SCORE: 41
ADLS_ACUITY_SCORE: 45
ADLS_ACUITY_SCORE: 42
ADLS_ACUITY_SCORE: 41
ADLS_ACUITY_SCORE: 45

## 2024-07-15 NOTE — CARE PLAN
"BP (!) 143/68 (BP Location: Left arm)   Pulse 70   Temp 97.7  F (36.5  C) (Oral)   Resp 16   Ht 1.613 m (5' 3.5\")   Wt 60.8 kg (134 lb 1.6 oz)   SpO2 96%   BMI 23.38 kg/m      Shift: 2381-7596  VS: slightly hypertensive otherwise all other VSS on RA, afebrile  Neuro: Aox4  Behaviors: calm and cooperative  BG: ACHS  Respiratory: diminished sounds at bases  Cardiac: WDL  Pain/Nausea: reports abd discomfort, denies nausea  Diet: regular  IV Access: RPIV  Lines/Drains: R ,L -abd drain, right biliary drain, G tube  GI/: voiding, 2 BMs today   Skin: midline incision stapled ANDRE  Mobility: Assist x1   Plan: continue plan of care     "

## 2024-07-15 NOTE — CONSULTS
"      [facility] Consult Service Note  Interventional Radiology  07/15/24   12:12 PM    Consult Requested: Biliary tube study    Recommendations/Plan:    Patient is approved for image guided biliary drain pull back study. Drain removal if no evidence of stricture/dilation/leak.    Patient is on the IR schedule on 7/16/24.      Timing of procedure is TBD based on IR staffing/schedule and triage.  Please contact the IR charge RN at [phone] for estimated time of procedure.     Labs WNL for procedure.    Orders entered for procedure. No NPO required.  Medications to be held include: none  Informed consent will be completed prior to procedure.     Case and imaging discussed with IR attending Dr. Eliecer Thornton MD   Recommendations were reviewed with Aminata Jay PA-C.    History of Present Illness:  Rocío Russell is a 73 year old English speaking female with past medical history of stricture of the CBD s/p Cholecystectomy. Patient now s/p Hepaticojejunostomy, Tamiko En Y, and ARABELLA on 7/10/24.     Pertinent Imaging Reviewed: Xray and fluoro images post surgery.      Expected date of discharge:  07/16/2024    Vitals:   /58   Pulse 74   Temp 97.5  F (36.4  C) (Oral)   Resp 16   Ht 1.613 m (5' 3.5\")   Wt 60.8 kg (134 lb 1.6 oz)   SpO2 92%   BMI 23.38 kg/m      Pertinent Labs Reviewed:  CBC:  Lab Results   Component Value Date    WBC 11.6 (H) 07/15/2024    WBC 12.0 (H) 07/14/2024    WBC 11.6 (H) 07/14/2024     Lab Results   Component Value Date    HGB 9.3 07/15/2024    HGB 8.6 07/14/2024    HGB 8.8 07/14/2024     Lab Results   Component Value Date     07/15/2024     07/14/2024     07/14/2024    INR:  Lab Results   Component Value Date    INR 1.07 07/15/2024    INR 1.2 (H) 04/17/2024    PTT 29 07/10/2024          COVID Results:  COVID-19 Antibody Results, Testing for Immunity           No data to display              COVID-19 PCR Results          3/27/2024    21:24 "   COVID-19 PCR Results   COVID-19 Virus by PCR (External Result) Not Detected          Details          This result is from an external source.            Potassium   Date Value Ref Range Status   07/15/2024 4.0 3.4 - 5.3 mmol/L Final     Potassium POCT   Date Value Ref Range Status   07/10/2024 4.7 3.4 - 5.3 mmol/L Final          Bari Stanford PA-C  Interventional Radiology  Pager: [phone]

## 2024-07-15 NOTE — PROGRESS NOTES
Cross-cover note: 1:26 AM 7/15/2024     07/15/2024    General Surgery Cross Cover Note    Called by nursing regarding patient unable to have a bowel movement despite suppository x2 and bowel regimen.     Per patient continues to have abdominal pain of her LLQ. Daughter at bedside states she has only had a small bowel movement. Pt denies nausea/vomiting.     B/P: 153/74, T: 97.8, P: 71, R: 16    PE:  A&O x4, NAD  Breathing non-labored on RA   Abd soft and distended. Tender to light palpation of LLQ but not peritonitic   Extr. Warm to touch  Incisions clean, dry, intact    Plan:  - Ordered KUB to compare to baseline completed yesterday   - Labs this am significant for Mg 2.7, avoid Milk of Magnesium   - Ordered pink lady enema   - NPO at midnight 2/2 to cholangiography to be done in am     Honorio Bledsoe MD  Surgery Resident PGY-1  Please page primary team with questions

## 2024-07-15 NOTE — PROGRESS NOTES
Pt has 100% [place] for iv abx through their [place] secondary.    ([facility]) In reference to admission date 07/10/2024.      Please contact Intake with any questions, [phone] or In Basket pool, [facility] ([phone]).

## 2024-07-15 NOTE — PROGRESS NOTES
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 Patient now s/p Hepaticojejunostomy, Melinda En Y, and ARABELLA on 7/10/24. Surgeon was Dr. Becker.     Cardiorespiratory: Stable   HTN: PTA Lisinopril, Lasix. SBP up to 140-150s.  HLD: PTA fenofibrate  Hematology: Hgb stable, 8-9  GI/Nutrition: NGT removed  Tolerating Regular diet. NPO after midnight for cholangioram tomorrow  Cholangiogram Tuesday via PTBD  Constipation: AXR Large right colonic stool burden. Bowel regimen senna BID, Daily dulcolax. Tap water enema Sunday, feeling better after bowel movement.  Fluid/Electrolytes: MIVF: Discontinue IVF  Replete electrolytes PRN  Endocrine:   DM2: PTA metformin and glimepiride. Hold. Sliding scale AC& HS.with increase po intake.   : voiding  Infectious disease: Afebrile, WBC 12. . Culture GB +Citrobacter, Klebsiella, and Enterococcus gallinarum and Enterococcus faecalis.   Bacteremia: +Citrobacter,  and Enterococcus faecalis. Consulted ID, will switch back from Imipenem-cilastatin to zosyn. Continue diflucan. Will plan for antibiotics for 14 days from negative blood culture ~7/12. Will look into home insurance coverage.   Prophylaxis: Heparin subcutaneous, PPI  Activity: OOB to chair. Ambulate TID.  Disposition: 7A, planning for discharge likely Wednesday, possibly Tuesday after cholangiogram if IV antibiotics for home set up    DENIS/Fellow/Resident Provider: Aminata Jay PA-C    Faculty: Anne Mei M.D.  __________________________________________________________________  Transplant History: Admitted 7/10/2024 for s/p hepaticojejunostomy, melinda en y, ARABELLA 2/2 CBD s/p cholecystectomy.  N/A, Postoperative day:      Interval History: History is obtained from the patient  Overnight events: Feeling better after BM. Tolerating a regular diet (when ordered)    ROS:   A  10-point review of systems was negative except as noted above.    Meds:  Current Facility-Administered Medications   Medication Dose Route Frequency Provider Last Rate Last Admin    amitriptyline (ELAVIL) tablet 10 mg  10 mg Oral At Bedtime Alejandro Becker MD   10 mg at 07/14/24 2238    bisacodyl (DULCOLAX) suppository 10 mg  10 mg Rectal Daily Brigette Gill MD   10 mg at 07/15/24 0939    DULoxetine (CYMBALTA) DR capsule 60 mg  60 mg Oral Daily Alejandro Becker MD   60 mg at 07/15/24 0917    famotidine (PEPCID) tablet 20 mg  20 mg Oral BID Connor Lux MD   20 mg at 07/15/24 0920    gabapentin (NEURONTIN) capsule 600 mg  600 mg Oral BID Alejandro Becker MD   600 mg at 07/15/24 0917    heparin ANTICOAGULANT injection 5,000 Units  5,000 Units Subcutaneous Q8H Alejandro Becker MD   5,000 Units at 07/15/24 0917    insulin aspart (NovoLOG) injection (RAPID ACTING)  1-7 Units Subcutaneous Q4H Gloria Kerr PA-C   1 Units at 07/15/24 0950    lisinopril (ZESTRIL) tablet 40 mg  40 mg Oral Daily Aminata Jay PA-C   40 mg at 07/15/24 0915    methocarbamol (ROBAXIN) tablet 500 mg  500 mg Oral TID Rama Galo, NP   500 mg at 07/15/24 0917    metoprolol (LOPRESSOR) injection 5 mg  5 mg Intravenous Q6H Connor Lux MD   5 mg at 07/15/24 1246    piperacillin-tazobactam (ZOSYN) 3.375 g vial to attach to  mL bag  3.375 g Intravenous Q6H Aminata Jay PA-C        polyethylene glycol (MIRALAX) Packet 17 g  17 g Oral Daily Connor Lux MD   17 g at 07/15/24 0917    senna-docusate (SENOKOT-S/PERICOLACE) 8.6-50 MG per tablet 1 tablet  1 tablet Oral BID Connor Lux MD   1 tablet at 07/15/24 0917    simvastatin (ZOCOR) tablet 40 mg  40 mg Oral At Bedtime Gloria Kerr PA-C   40 mg at 07/14/24 2238    sodium chloride (PF) 0.9% PF flush 3 mL  3 mL Intracatheter Q8H Connor Lux MD   3 mL at 07/14/24 7601       Physical Exam:     Admit Weight: 60.8 kg (134  "lb 0.6 oz)    Current vitals:   BP (!) 143/68 (BP Location: Left arm)   Pulse 70   Temp 97.7  F (36.5  C) (Oral)   Resp 16   Ht 1.613 m (5' 3.5\")   Wt 60.8 kg (134 lb 1.6 oz)   SpO2 96%   BMI 23.38 kg/m           Vital sign ranges:    Temp:  [97.5  F (36.4  C)-98.5  F (36.9  C)] 97.7  F (36.5  C)  Pulse:  [67-74] 70  Resp:  [16] 16  BP: (123-153)/(58-74) 143/68  SpO2:  [91 %-100 %] 96 %  Patient Vitals for the past 24 hrs:   BP Temp Temp src Pulse Resp SpO2 Weight   07/15/24 1339 (!) 143/68 97.7  F (36.5  C) Oral 70 16 96 % --   07/15/24 1246 -- -- -- 72 -- -- --   07/15/24 1238 (!) 148/72 -- -- -- -- -- --   07/15/24 0957 -- 97.5  F (36.4  C) Oral -- 16 92 % --   07/15/24 0915 123/58 -- -- -- -- -- --   07/15/24 0748 -- -- -- -- -- -- 60.8 kg (134 lb 1.6 oz)   07/15/24 0558 136/67 -- -- 74 -- 100 % --   07/15/24 0113 (!) 153/74 97.8  F (36.6  C) Oral 71 16 100 % --   07/14/24 2121 (!) 153/71 98.5  F (36.9  C) Oral 74 16 100 % --   07/14/24 1822 (!) 146/62 -- -- -- -- -- --   07/14/24 1709 137/61 98.2  F (36.8  C) Oral 67 16 91 % --     General Appearance: in no apparent distress.   Skin: normal, dry  Heart: perfused  Lungs: NLB on RA  Abdomen: The abdomen is flat, and  mildly tender to right abdomen. The wound is cdi to midline with staples. MILLI x 2 serosanguinous output. Biliary drain capped.  G/J tube capped.  : voiding  Extremities: edema: absent.   Neurologic: awake, alert, and oriented.     Data:   CMP  Recent Labs   Lab 07/15/24  0949 07/15/24  0642 07/14/24  0631 07/14/24  0618 07/14/24  0248 07/14/24  0104 07/10/24  1610 07/10/24  1404 07/10/24  1302   NA  --  130*  --  129*  --  129*   < > 136 135   POTASSIUM  --  4.0  --  4.1  --  4.3   < > 4.7 4.6   CHLORIDE  --  98  --  100  --  101   < >  --   --    CO2  --  23  --  21*  --  21*   < >  --   --    * 185*   < > 176*   < > 209*   < > 131* 128*   BUN  --  9.1  --  10.3  --  10.6   < >  --   --    CR  --  0.54  --  0.52  --  0.52   < >  --   " --    GFRESTIMATED  --  >90  --  >90  --  >90   < >  --   --    CHEY  --  8.4*  --  8.8  --  8.3*   < >  --   --    ICAW  --   --   --   --   --   --   --  4.6 4.8   MAG  --  1.9  --  2.7*  --  1.5*   < >  --   --    PHOS  --  3.5  --   --   --  3.1  --   --   --    ALBUMIN  --  2.8*  --  2.8*  --   --    < >  --   --    BILITOTAL  --  0.3  --  0.3  --   --    < >  --   --    ALKPHOS  --  205*  --  222*  --   --    < >  --   --    AST  --  45  --  83*  --   --    < >  --   --    ALT  --  217*  --  351*  --   --    < >  --   --     < > = values in this interval not displayed.     CBC  Recent Labs   Lab 07/15/24  0642 07/14/24  0618 07/10/24  0848 07/09/24  1438   HGB 9.3* 8.6*   < > 9.5*   WBC 11.6* 12.0*   < > 11.5*    344   < > 526*   A1C  --   --   --  7.4*    < > = values in this interval not displayed.     COAGS  Recent Labs   Lab 07/15/24  0642 07/14/24  0618 07/11/24  0546 07/10/24  1624   INR 1.07 1.11   < > 1.21*   PTT  --   --   --  29    < > = values in this interval not displayed.      Urinalysis  No lab results found.

## 2024-07-15 NOTE — PLAN OF CARE
"BP (!) 153/74 (BP Location: Left arm)   Pulse 71   Temp 97.8  F (36.6  C) (Oral)   Resp 16   Ht 1.613 m (5' 3.5\")   Wt 61.1 kg (134 lb 12.8 oz)   SpO2 100%   BMI 23.50 kg/m      2300 - 0730    Hypertensive up to 150's/70's, OVSS on RA. AO x4. NPO for cholangiogram. Ax 1-2 w/ walker. BG ACHS (231,197). Voids w/o difficulty. Legally blind- takes pills by placing them on her lap and she feel and pick them up.     RLQ Biliary drain capped. G tube clamped. MILLI x2- R (110cc) and L (100)- serous output from both.     C/o abd pain, pt received suppository x3, miralax daily, and senna BID w/ no BM but passing gas. Pink lady enema given, very small amount of stool. Milk of Mag and simethicone given. Passing lots of gas but no BM.     "

## 2024-07-15 NOTE — PROGRESS NOTES
"Care Management Follow Up    Length of Stay (days): 5    Expected Discharge Date: 07/16/2024     Concerns to be Addressed: discharge planning     Patient plan of care discussed at interdisciplinary rounds: Yes    Anticipated Discharge Disposition: Home     Anticipated Discharge Services: Home Care  Anticipated Discharge DME: Wheelchair, Walker    Patient/family educated on Medicare website which has current facility and service quality ratings: no  Education Provided on the Discharge Plan: Yes  Patient/Family in Agreement with the Plan: yes    Referrals Placed by CM/SW: Home [facility]fusion  Private pay costs discussed: Not applicable    Additional Information:    Per provider, patient will need to discharge on IV antibiotic. Benefit check sent to [facility]    Per I \"Pt has 100% coverage for iv abx through [place].\"    CC will continue to follow up.  Claudia Sousa RN, PHN, BSN   Float Nurse Care Coordinator  Covering for [address]  Phone [phone]  "

## 2024-07-15 NOTE — PROGRESS NOTES
"BP (!) 149/63 (BP Location: Left arm)   Pulse 69   Temp 98.5  F (36.9  C) (Oral)   Resp 21   Ht 1.613 m (5' 3.5\")   Wt 60.8 kg (134 lb 1.6 oz)   SpO2 97%   BMI 23.38 kg/m       [id number]    Patient had no complaints of pain, shortness of breath or chest pain this shift.  Patient will not have to be NPO overnight for procedure tomorrow. No new skin deficits,.  AO x 4.  Daughter at bedside.  Assist x 1 to commode.  RPIV currently SL.  "

## 2024-07-15 NOTE — CONSULTS
GENERAL INFECTIOUS DISEASES CONSULTATION - GREEN TEAM     Patient:  Rocío Russell   Date of birth 1950, Medical record number [id number]  Date of Visit:  07/15/2024  Date of Admission: 7/10/2024  Consult Requested by:Alejandro Becker MD  Reason for Consult:  Polymicrobial bacteremia , antibiotic guidance and duration          Assessment and Recommendations:     Rocío Russell is a 73 year old female with a past medical history of HTN, HLD, h/o DVT, anemia, tobacco use, GERD, type 2 diabetes, and h/o symptomatic cholelithiasis s/p laparoscopic cholecystectomy (12/2023) complicated by common bile duct stricture s/p percutaneous transhepatic biliary drain placement, G-tube placement and MILLI drain placement (exchanged on 5/10/2024). Patient was admitted on 7/10/2024 and underwent planned Tamiko-en-Y hepaticojejunostomy and Lysis of adhesions. Intra op noted purulent material in the bile ducts and specimen was sent for gram stain and culture. fluid culture grew 4+ Citrobacter koseri, 4+ Klebsiella oxytoca 4+ Enterococcus gallinarum and 4+ Enterococcus faecalis. She was on Pip/tazobactam, Vancomycin IV and fluconazole on 7/10.     Later that day, patient had a fever of 100.4F and WBC increased to 20.3 from 11.5 on 7/9/24. Blood cultures were obtained and later came back positive for Citrobacter koseri and Enterococcus faecalis . Pip/tazobactam was discontinued and  switched to Imipenem- cilastatin on 7/11.    Vanco IV was discontinued on 7/11 and Fluconazole was discontinued on 7/13/24.     ASSESSMENT:  Bacteremia (Citrobacter koseri and Enterococcus faecalis) - 7/10/24  - blood cx - neg on 7/12 and 7/13 so far   Purulence in common bile duct - 1/0/24  H/o symptomatic cholelithiasis s/p laparoscopic cholecystectomy (12/2023) complicated by common bile duct stricture s/p percutaneous transhepatic biliary drain placement, G-tube placement and MILLI drain placement (exchanged on 5/10/2024).  -     Tamiko-en-Y  hepaticojejunostomy and Lysis of adhesions - 7/10/24   Leukocytosis - improving     RECOMMENDATION:  - stop imipenem and restart Pip/ tazo to cover all bacteria isolated in the blood and common bile duct/   - bacteremia cleared on 7/12/24   - duration of Pip/Tazobactam will be ~ 14 days from 7/12 ( first negative blood culture)   - no Picc line until blood cultures are negative x 72 hours     ID will continue to follow . Discussed Recommendations with Primary team     Thank you for allowing us to participate in the care of this patient    Luis Pennington MD, M.Med.Sc  Staff, Infectious Diseases       60 Minutes spent by me on the date of service doing chart review, history, exam, documentation, coordination of care and further activities per the note             History of Present Illness:     Rocío Russell is a 73 year old female with a past medical history of HTN, HLD, h/o DVT, anemia, tobacco use, GERD, type 2 diabetes, and h/o symptomatic cholelithiasis s/p laparoscopic cholecystectomy (12/2023) complicated by common bile duct stricture s/p percutaneous transhepatic biliary drain placement, G-tube placement and MILLI drain placement (exchanged on 5/10/2024). Patient was admitted on 7/10/2024 and underwent planned Tamiko-en-Y hepaticojejunostomy and Lysis of adhesions. Intra op noted purulent material in the bile ducts and specimen was sent for gram stain and culture. fluid culture grew 4+ Citrobacter koseri, 4+ Klebsiella oxytoca 4+ Enterococcus gallinarum and 4+ Enterococcus faecalis. She was on Pip/tazobactam, Vancomycin IV and fluconazole on 7/10.     Later that day, patient had a fever of 100.4F and WBC increased to 20.3 from 11.5 on 7/9/24. Blood cultures were obtained and later came back positive for Citrobacter koseri and Enterococcus faecalis . Pip/tazobactam was discontinued and  switched to Imipenem- cilastatin on 7/11.    Vanco IV was discontinued on 7/11 and Fluconazole was discontinued on  7/13/24.     Repeat blood cultures on 7/12 - remain negative x 2 , 7/13 - negative x 2   She is afebrile with WBC trending down to 11.6 today ( 7/15/24)     she has mild pain around the drains and had a bowel movement today. She is currently NPO for a procedure. no nausea, vomiting   Otherwise, she is feeling better and has been ambulating.    no fever, or signs of infection prior to admission.     Antimicrobials  Imipenem 7/12 - present      Pip/tazo 7/10-7/11  Vancomycin 7/10-7/11   Fluconazole 7/10-7/13          Review of Systems:   CONSTITUTIONAL:  see HPI  EYES: negative for icterus  ENT:  negative for hearing loss, tinnitus and sore throat  RESPIRATORY:  negative for cough with sputum and dyspnea  CARDIOVASCULAR:  negative for chest pain, dyspnea  GASTROINTESTINAL:  see HPI  GENITOURINARY:  negative for dysuria  HEME:  No easy bruising  INTEGUMENT:  negative for rash and pruritus  NEURO:  Negative for headache         Past Medical History:     Past Medical History:   Diagnosis Date    Closed fracture of one or more phalanges of foot     Diabetes mellitus (H)     Foot drop     Gastroesophageal reflux disease with esophagitis     HLD (hyperlipidemia)     HTN (hypertension)     Injury of bile duct     Osteopenia     Personal history of DVT (deep vein thrombosis)     Sciatica, unspecified laterality     Tobacco use             Past Surgical History:     Past Surgical History:   Procedure Laterality Date    BREAST LUMPECTOMY W/ NEEDLE LOCALIZATION      CARPAL TUNNEL RELEASE RT/LT Bilateral     CHOLECYSTECTOMY      EXPLORATORY LAPAROTOMY, EVACUATION OF TWO LITERS OF FLUID CONTAINING BLOOD AND BILE, PERITONEAL LAVAGE, DRAINAGE OF RIGHT KIDNEY CYST, PLACEMENT OF DRAIN, INSERTION OF GASTROSTOMY FEEDING TUB      HEPATICOJEJUNOSTOMY N/A 7/10/2024    Procedure: HEPATICOJEJUNOSTOMY, Tamiko En-Y, Lysis of adhesions;  Surgeon: Alejandro Becker MD;  Location: [facility] OR    IR ERCP  12/18/2023    LUMBAR SPINE SURGERY       LAMINECTOMY DISCECTOMY    RIGHT OOPHORECTOMY              Family History:     Family History   Problem Relation Age of Onset    Anesthesia Reaction No family hx of     Deep Vein Thrombosis (DVT) No family hx of             Social History:     Social History     Tobacco Use    Smoking status: Every Day     Current packs/day: 0.20     Types: Cigarettes     Passive exposure: Current    Smokeless tobacco: Never    Tobacco comments:     5-8per day   Substance Use Topics    Alcohol use: Not Currently     History   Sexual Activity    Sexual activity: Not on file            Current Medications (antimicrobials listed in bold):     Current Facility-Administered Medications   Medication Dose Route Frequency Provider Last Rate Last Admin    amitriptyline (ELAVIL) tablet 10 mg  10 mg Oral At Bedtime Alejandro Becker MD   10 mg at 07/14/24 2238    bisacodyl (DULCOLAX) suppository 10 mg  10 mg Rectal Daily Brigette Gill MD   10 mg at 07/15/24 0939    DULoxetine (CYMBALTA) DR capsule 60 mg  60 mg Oral Daily Alejandro Becker MD   60 mg at 07/15/24 0917    famotidine (PEPCID) tablet 20 mg  20 mg Oral BID Connor Lux MD   20 mg at 07/15/24 0920    gabapentin (NEURONTIN) capsule 600 mg  600 mg Oral BID Alejandro Becker MD   600 mg at 07/15/24 0917    heparin ANTICOAGULANT injection 5,000 Units  5,000 Units Subcutaneous Q8H Alejandro Becker MD   5,000 Units at 07/15/24 0917    imipenem-cilastatin (PRIMAXIN) 1,000 mg of imipenem in sodium chloride 0.9 % 250 mL intermittent infusion  1,000 mg of imipenem Intravenous Q8H Alejandro Becker MD   1,000 mg of imipenem at 07/15/24 0907    insulin aspart (NovoLOG) injection (RAPID ACTING)  1-7 Units Subcutaneous Q4H Gloria Kerr PA-C   1 Units at 07/15/24 0950    lisinopril (ZESTRIL) tablet 40 mg  40 mg Oral Daily Aminata Jay PA-C   40 mg at 07/15/24 0915    methocarbamol (ROBAXIN) tablet 500 mg  500 mg Oral TID Rama Galo NP   500 mg  at 07/15/24 0917    metoprolol (LOPRESSOR) injection 5 mg  5 mg Intravenous Q6H Connor Lux MD   5 mg at 07/15/24 0558    polyethylene glycol (MIRALAX) Packet 17 g  17 g Oral Daily Connor Lux MD   17 g at 07/15/24 0917    senna-docusate (SENOKOT-S/PERICOLACE) 8.6-50 MG per tablet 1 tablet  1 tablet Oral BID Connor Lux MD   1 tablet at 07/15/24 0917    simvastatin (ZOCOR) tablet 40 mg  40 mg Oral At Bedtime Gloria Kerr PA-C   40 mg at 07/14/24 2238    sodium chloride (PF) 0.9% PF flush 3 mL  3 mL Intracatheter Q8H Connor Lux MD   3 mL at 07/14/24 1749            Allergies:     Allergies   Allergen Reactions    Animal Dander Other (See Comments)     Runny nose/watery eyes    Nickel Rash    Perfume Headache    Codeine Other (See Comments) and Unknown     Other reaction(s): Nightmares   Other reaction(s): Nightmares    Other reaction(s): Nightmares    Sulfa Antibiotics      Eye drops            Physical Exam:   Vitals were reviewed  Patient Vitals for the past 24 hrs:   BP Temp Temp src Pulse Resp SpO2 Weight   07/15/24 0957 -- 97.5  F (36.4  C) Oral -- 16 92 % --   07/15/24 0915 123/58 -- -- -- -- -- --   07/15/24 0748 -- -- -- -- -- -- 60.8 kg (134 lb 1.6 oz)   07/15/24 0558 136/67 -- -- 74 -- 100 % --   07/15/24 0113 (!) 153/74 97.8  F (36.6  C) Oral 71 16 100 % --   07/14/24 2121 (!) 153/71 98.5  F (36.9  C) Oral 74 16 100 % --   07/14/24 1822 (!) 146/62 -- -- -- -- -- --   07/14/24 1709 137/61 98.2  F (36.8  C) Oral 67 16 91 % --   07/14/24 1329 (!) 155/69 98.1  F (36.7  C) Oral 80 16 100 % --   07/14/24 1228 (!) 153/73 -- -- 96 -- -- --   07/14/24 1114 (!) 144/65 -- -- 89 -- -- --     Ranges for his vital signs:  Temp:  [97.5  F (36.4  C)-98.5  F (36.9  C)] 97.5  F (36.4  C)  Pulse:  [67-96] 74  Resp:  [16] 16  BP: (123-155)/(58-74) 123/58  SpO2:  [91 %-100 %] 92 %    Physical Examination:  GENERAL:  alert, oriented, sitting up in bed in no acute distress.   HEENT:  Head is normocephalic,  atraumatic   EYES:  Eyes have anicteric sclerae without conjunctival injection    ENT:  Oropharynx is moist without exudates or ulcers. Tongue is midline  NECK:  Supple. No  Cervical lymphadenopathy  LUNGS:  Clear to auscultation bilateral.   CARDIOVASCULAR:  Regular rate and rhythm with no murmurs  ABDOMEN:  Normal bowel sounds, soft, mildly tender around the drains . 2 MILLI drains, biliary drain capped   SKIN:  No acute rashes.  Line(s) are in place without any surrounding erythema or exudate. No stigmata of endocarditis.  Extremities : trace edema   NEUROLOGIC:  Grossly nonfocal. Active x4 extremities         Laboratory Data:     Hematology Studies    Recent Labs   Lab Test 07/15/24  0642 07/14/24  0618 07/14/24  0104 07/13/24  0542 07/12/24  0600 07/11/24  0546   WBC 11.6* 12.0* 11.6* 13.7* 17.8* 22.8*   HGB 9.3* 8.6* 8.8* 8.5* 9.2* 9.1*   MCV 80 80 81 82 79 80    344 293 293 319 345     Metabolic Studies     Recent Labs   Lab Test 07/15/24  0642 07/14/24  0618 07/14/24  0104 07/13/24  0542 07/12/24  0600   * 129* 129* 130* 134*   POTASSIUM 4.0 4.1 4.3 4.3 4.3   CHLORIDE 98 100 101 100 104   CO2 23 21* 21* 21* 20*   BUN 9.1 10.3 10.6 9.3 15.9   CR 0.54 0.52 0.52 0.46* 0.53   GFRESTIMATED >90 >90 >90 >90 >90     Hepatic Studies    Recent Labs   Lab Test 07/15/24  0642 07/14/24  0618 07/13/24  0542 07/12/24  0600 07/11/24  0546 07/10/24  1624   BILITOTAL 0.3 0.3 0.4 0.4 0.6 1.3*   ALKPHOS 205* 222* 210* 234* 272* 346*   ALBUMIN 2.8* 2.8* 2.8* 2.9* 3.0* 3.2*   AST 45 83* 216* 677* 2,323* 3,824*   * 351* 545* 906* 1,343* 1,836*     Hepatitis B Testing   Recent Labs   Lab Test 07/10/24  1435   HEPBANG Nonreactive

## 2024-07-16 ENCOUNTER — APPOINTMENT (OUTPATIENT)
Dept: INTERVENTIONAL RADIOLOGY/VASCULAR | Facility: CLINIC | Age: 74
DRG: 329 | End: 2024-07-16
Attending: PHYSICIAN ASSISTANT
Payer: MEDICARE

## 2024-07-16 ENCOUNTER — APPOINTMENT (OUTPATIENT)
Dept: PHYSICAL THERAPY | Facility: CLINIC | Age: 74
DRG: 329 | End: 2024-07-16
Attending: TRANSPLANT SURGERY
Payer: MEDICARE

## 2024-07-16 ENCOUNTER — APPOINTMENT (OUTPATIENT)
Dept: NUCLEAR MEDICINE | Facility: CLINIC | Age: 74
DRG: 329 | End: 2024-07-16
Attending: PHYSICIAN ASSISTANT
Payer: MEDICARE

## 2024-07-16 ENCOUNTER — APPOINTMENT (OUTPATIENT)
Dept: OCCUPATIONAL THERAPY | Facility: CLINIC | Age: 74
DRG: 329 | End: 2024-07-16
Attending: TRANSPLANT SURGERY
Payer: MEDICARE

## 2024-07-16 LAB
ALBUMIN SERPL BCG-MCNC: 2.7 G/DL (ref 3.5–5.2)
ALP SERPL-CCNC: 198 U/L (ref 40–150)
ALT SERPL W P-5'-P-CCNC: 140 U/L (ref 0–50)
ANION GAP SERPL CALCULATED.3IONS-SCNC: 9 MMOL/L (ref 7–15)
AST SERPL W P-5'-P-CCNC: 37 U/L (ref 0–45)
BILIRUB SERPL-MCNC: 0.3 MG/DL
BUN SERPL-MCNC: 10 MG/DL (ref 8–23)
CALCIUM SERPL-MCNC: 8.3 MG/DL (ref 8.8–10.2)
CHLORIDE SERPL-SCNC: 100 MMOL/L (ref 98–107)
CREAT SERPL-MCNC: 0.54 MG/DL (ref 0.51–0.95)
EGFRCR SERPLBLD CKD-EPI 2021: >90 ML/MIN/1.73M2
ERYTHROCYTE [DISTWIDTH] IN BLOOD BY AUTOMATED COUNT: 16.8 % (ref 10–15)
GLUCOSE BLDC GLUCOMTR-MCNC: 149 MG/DL (ref 70–99)
GLUCOSE BLDC GLUCOMTR-MCNC: 151 MG/DL (ref 70–99)
GLUCOSE BLDC GLUCOMTR-MCNC: 165 MG/DL (ref 70–99)
GLUCOSE BLDC GLUCOMTR-MCNC: 187 MG/DL (ref 70–99)
GLUCOSE BLDC GLUCOMTR-MCNC: 221 MG/DL (ref 70–99)
GLUCOSE BLDC GLUCOMTR-MCNC: 314 MG/DL (ref 70–99)
GLUCOSE SERPL-MCNC: 162 MG/DL (ref 70–99)
HCO3 SERPL-SCNC: 22 MMOL/L (ref 22–29)
HCT VFR BLD AUTO: 26.7 % (ref 35–47)
HGB BLD-MCNC: 8.5 G/DL (ref 11.7–15.7)
INR PPP: 1.04 (ref 0.85–1.15)
MAGNESIUM SERPL-MCNC: 1.7 MG/DL (ref 1.7–2.3)
MCH RBC QN AUTO: 25.4 PG (ref 26.5–33)
MCHC RBC AUTO-ENTMCNC: 31.8 G/DL (ref 31.5–36.5)
MCV RBC AUTO: 80 FL (ref 78–100)
PHOSPHATE SERPL-MCNC: 3.1 MG/DL (ref 2.5–4.5)
PLATELET # BLD AUTO: 371 10E3/UL (ref 150–450)
POTASSIUM SERPL-SCNC: 4.2 MMOL/L (ref 3.4–5.3)
PROT SERPL-MCNC: 5.4 G/DL (ref 6.4–8.3)
RBC # BLD AUTO: 3.34 10E6/UL (ref 3.8–5.2)
SODIUM SERPL-SCNC: 131 MMOL/L (ref 135–145)
WBC # BLD AUTO: 12.6 10E3/UL (ref 4–11)

## 2024-07-16 PROCEDURE — 85027 COMPLETE CBC AUTOMATED: CPT | Performed by: PHYSICIAN ASSISTANT

## 2024-07-16 PROCEDURE — 250N000013 HC RX MED GY IP 250 OP 250 PS 637: Performed by: PHYSICIAN ASSISTANT

## 2024-07-16 PROCEDURE — 343N000001 HC RX 343: Performed by: TRANSPLANT SURGERY

## 2024-07-16 PROCEDURE — 97535 SELF CARE MNGMENT TRAINING: CPT | Mod: GO | Performed by: OCCUPATIONAL THERAPIST

## 2024-07-16 PROCEDURE — 97116 GAIT TRAINING THERAPY: CPT | Mod: GP

## 2024-07-16 PROCEDURE — 97530 THERAPEUTIC ACTIVITIES: CPT | Mod: GO | Performed by: OCCUPATIONAL THERAPIST

## 2024-07-16 PROCEDURE — 78226 HEPATOBILIARY SYSTEM IMAGING: CPT | Mod: 26 | Performed by: RADIOLOGY

## 2024-07-16 PROCEDURE — 250N000009 HC RX 250: Performed by: STUDENT IN AN ORGANIZED HEALTH CARE EDUCATION/TRAINING PROGRAM

## 2024-07-16 PROCEDURE — 84100 ASSAY OF PHOSPHORUS: CPT | Performed by: TRANSPLANT SURGERY

## 2024-07-16 PROCEDURE — 120N000011 HC R&B TRANSPLANT UMMC

## 2024-07-16 PROCEDURE — G1010 CDSM STANSON: HCPCS | Performed by: RADIOLOGY

## 2024-07-16 PROCEDURE — 99024 POSTOP FOLLOW-UP VISIT: CPT | Performed by: TRANSPLANT SURGERY

## 2024-07-16 PROCEDURE — 99232 SBSQ HOSP IP/OBS MODERATE 35: CPT | Performed by: INTERNAL MEDICINE

## 2024-07-16 PROCEDURE — 83735 ASSAY OF MAGNESIUM: CPT | Performed by: TRANSPLANT SURGERY

## 2024-07-16 PROCEDURE — A9537 TC99M MEBROFENIN: HCPCS | Performed by: TRANSPLANT SURGERY

## 2024-07-16 PROCEDURE — 78830 RP LOCLZJ TUM SPECT W/CT 1: CPT | Mod: MG

## 2024-07-16 PROCEDURE — 250N000011 HC RX IP 250 OP 636: Performed by: PHYSICIAN ASSISTANT

## 2024-07-16 PROCEDURE — 80053 COMPREHEN METABOLIC PANEL: CPT | Performed by: PHYSICIAN ASSISTANT

## 2024-07-16 PROCEDURE — 250N000013 HC RX MED GY IP 250 OP 250 PS 637: Performed by: STUDENT IN AN ORGANIZED HEALTH CARE EDUCATION/TRAINING PROGRAM

## 2024-07-16 PROCEDURE — 250N000011 HC RX IP 250 OP 636: Performed by: TRANSPLANT SURGERY

## 2024-07-16 PROCEDURE — 85610 PROTHROMBIN TIME: CPT | Performed by: PHYSICIAN ASSISTANT

## 2024-07-16 PROCEDURE — 97530 THERAPEUTIC ACTIVITIES: CPT | Mod: GP

## 2024-07-16 PROCEDURE — 250N000013 HC RX MED GY IP 250 OP 250 PS 637: Performed by: TRANSPLANT SURGERY

## 2024-07-16 PROCEDURE — 36415 COLL VENOUS BLD VENIPUNCTURE: CPT | Performed by: PHYSICIAN ASSISTANT

## 2024-07-16 RX ORDER — KIT FOR THE PREPARATION OF TECHNETIUM TC 99M MEBROFENIN 45 MG/10ML
5 INJECTION, POWDER, LYOPHILIZED, FOR SOLUTION INTRAVENOUS ONCE
Status: COMPLETED | OUTPATIENT
Start: 2024-07-16 | End: 2024-07-16

## 2024-07-16 RX ORDER — METHOCARBAMOL 500 MG/1
500 TABLET, FILM COATED ORAL 3 TIMES DAILY PRN
Status: DISCONTINUED | OUTPATIENT
Start: 2024-07-16 | End: 2024-07-18

## 2024-07-16 RX ADMIN — HEPARIN SODIUM 5000 UNITS: 5000 INJECTION, SOLUTION INTRAVENOUS; SUBCUTANEOUS at 00:19

## 2024-07-16 RX ADMIN — INSULIN ASPART 4 UNITS: 100 INJECTION, SOLUTION INTRAVENOUS; SUBCUTANEOUS at 22:02

## 2024-07-16 RX ADMIN — METOPROLOL TARTRATE 5 MG: 5 INJECTION INTRAVENOUS at 22:08

## 2024-07-16 RX ADMIN — FAMOTIDINE 20 MG: 20 TABLET ORAL at 19:49

## 2024-07-16 RX ADMIN — INSULIN ASPART 1 UNITS: 100 INJECTION, SOLUTION INTRAVENOUS; SUBCUTANEOUS at 10:15

## 2024-07-16 RX ADMIN — PIPERACILLIN AND TAZOBACTAM 3.38 G: 3; .375 INJECTION, POWDER, FOR SOLUTION INTRAVENOUS at 05:44

## 2024-07-16 RX ADMIN — POLYETHYLENE GLYCOL 3350 17 G: 17 POWDER, FOR SOLUTION ORAL at 09:50

## 2024-07-16 RX ADMIN — INSULIN ASPART 1 UNITS: 100 INJECTION, SOLUTION INTRAVENOUS; SUBCUTANEOUS at 18:14

## 2024-07-16 RX ADMIN — PIPERACILLIN AND TAZOBACTAM 3.38 G: 3; .375 INJECTION, POWDER, FOR SOLUTION INTRAVENOUS at 22:10

## 2024-07-16 RX ADMIN — INSULIN ASPART 1 UNITS: 100 INJECTION, SOLUTION INTRAVENOUS; SUBCUTANEOUS at 14:19

## 2024-07-16 RX ADMIN — INSULIN ASPART 2 UNITS: 100 INJECTION, SOLUTION INTRAVENOUS; SUBCUTANEOUS at 02:05

## 2024-07-16 RX ADMIN — GABAPENTIN 600 MG: 300 CAPSULE ORAL at 09:48

## 2024-07-16 RX ADMIN — METOPROLOL TARTRATE 5 MG: 5 INJECTION INTRAVENOUS at 05:42

## 2024-07-16 RX ADMIN — SENNOSIDES AND DOCUSATE SODIUM 1 TABLET: 50; 8.6 TABLET ORAL at 09:48

## 2024-07-16 RX ADMIN — MEBROFENIN 6.7 MILLICURIE: 45 INJECTION, POWDER, LYOPHILIZED, FOR SOLUTION INTRAVENOUS at 08:07

## 2024-07-16 RX ADMIN — METOPROLOL TARTRATE 5 MG: 5 INJECTION INTRAVENOUS at 12:25

## 2024-07-16 RX ADMIN — PIPERACILLIN AND TAZOBACTAM 3.38 G: 3; .375 INJECTION, POWDER, FOR SOLUTION INTRAVENOUS at 18:06

## 2024-07-16 RX ADMIN — AMITRIPTYLINE HYDROCHLORIDE 10 MG: 10 TABLET, FILM COATED ORAL at 22:11

## 2024-07-16 RX ADMIN — GABAPENTIN 600 MG: 300 CAPSULE ORAL at 19:49

## 2024-07-16 RX ADMIN — HEPARIN SODIUM 5000 UNITS: 5000 INJECTION, SOLUTION INTRAVENOUS; SUBCUTANEOUS at 09:55

## 2024-07-16 RX ADMIN — INSULIN ASPART 1 UNITS: 100 INJECTION, SOLUTION INTRAVENOUS; SUBCUTANEOUS at 05:46

## 2024-07-16 RX ADMIN — SIMVASTATIN 40 MG: 40 TABLET, FILM COATED ORAL at 22:12

## 2024-07-16 RX ADMIN — DULOXETINE HYDROCHLORIDE 60 MG: 60 CAPSULE, DELAYED RELEASE ORAL at 09:48

## 2024-07-16 RX ADMIN — PIPERACILLIN AND TAZOBACTAM 3.38 G: 3; .375 INJECTION, POWDER, FOR SOLUTION INTRAVENOUS at 12:24

## 2024-07-16 RX ADMIN — HEPARIN SODIUM 5000 UNITS: 5000 INJECTION, SOLUTION INTRAVENOUS; SUBCUTANEOUS at 16:55

## 2024-07-16 RX ADMIN — LISINOPRIL 40 MG: 40 TABLET ORAL at 09:49

## 2024-07-16 RX ADMIN — FAMOTIDINE 20 MG: 20 TABLET ORAL at 09:49

## 2024-07-16 ASSESSMENT — ACTIVITIES OF DAILY LIVING (ADL)
ADLS_ACUITY_SCORE: 41

## 2024-07-16 NOTE — PLAN OF CARE
"Goal Outcome Evaluation:  BP (!) 144/66 (BP Location: Left arm)   Pulse 67   Temp 97.6  F (36.4  C) (Oral)   Resp 18   Ht 1.613 m (5' 3.5\")   Wt 60.8 kg (134 lb 1.6 oz)   SpO2 100%   BMI 23.38 kg/m      Shift: 9427-8543  VS: Vitals stable, room air, afebrile  Neuro: Alert and oriented x4, blind at baseline   BG: Q4 hr sugars 267, 221, 187  Labs: Awaiting AM labs   Pain/Nausea: Pain managed by scheduled robaxin, denies nausea   Diet: NPO  IV Access: R PIV x1, zosyn q6  Lines/Drains: G tube clamped, biliary drain clamped, R + L MILLI drain   GI/: Voiding adequately, LBM 7/15  Skin: Midline incision ANDRE  Mobility: Assist x1  Plan: Continue with POC and notify team with any changes. HIDA scan + biliary tube change 7/16  "

## 2024-07-16 NOTE — PROGRESS NOTES
Agree with below notes  Transplant Surgery  Inpatient Daily Progress Note  07/16/2024    Assessment & Plan: Rocoí Russell is a 73 year old female who developed a stricture of the CBD s/p Cholecystectomy. Patient now s/p Hepaticojejunostomy, Tamiko En Y, and ARABELLA on 7/10/24. Surgeon was Dr. Becker.     Cardiorespiratory: Stable   HTN: PTA Lisinopril, Lasix. SBP up to 140-150s.  HLD: PTA fenofibrate  Hematology: Hgb stable, 8-9  GI/Nutrition: NPO after midnight for cholangioram today  Constipation: AXR Large right colonic stool burden. Bowel regimen senna BID, Daily dulcolax.   S/p hepaticojejunostomy: HIDA and biliary drain study in IR today.  Fluid/Electrolytes:   Hyponatremia: Na 132. Euvolemic. Monitor.  Endocrine:   DM2: PTA metformin and glimepiride. Hold. Sliding scale AC& HS.  : voiding  Infectious disease: Afebrile, WBC 12.6.   Cholecystitis: Culture GB +Citrobacter, Klebsiella, and Enterococcus gallinarum and Enterococcus faecalis.   Bacteremia: +Citrobacter,  and Enterococcus faecalis. Consulted ID, will switch back from Imipenem-cilastatin to zosyn. Continue diflucan. Will plan for antibiotics for 14 days from negative blood culture ~7/12. Will look into home insurance coverage.   Prophylaxis: Heparin subcutaneous, PPI  Activity: OOB to chair. Ambulate TID.  Disposition: 7A    DENIS/Fellow/Resident Provider: Nancy Olmos NP     Faculty: Anne Mei M.D.  __________________________________________________________________    Interval History: History is obtained from the patient  Overnight events: Still feels constipated    ROS:   A 10-point review of systems was negative except as noted above.    Meds:  Current Facility-Administered Medications   Medication Dose Route Frequency Provider Last Rate Last Admin    amitriptyline (ELAVIL) tablet 10 mg  10 mg Oral At Bedtime Alejandro Becker MD   10 mg at 07/15/24 2204    bisacodyl (DULCOLAX) suppository 10 mg  10 mg Rectal Daily Brigette Gill,  "MD   10 mg at 07/15/24 0939    DULoxetine (CYMBALTA) DR capsule 60 mg  60 mg Oral Daily Alejandro Becker MD   60 mg at 07/16/24 0948    famotidine (PEPCID) tablet 20 mg  20 mg Oral BID Connor Lux MD   20 mg at 07/16/24 0949    gabapentin (NEURONTIN) capsule 600 mg  600 mg Oral BID Alejandro Becker MD   600 mg at 07/16/24 0948    heparin ANTICOAGULANT injection 5,000 Units  5,000 Units Subcutaneous Q8H Alejandro Becker MD   5,000 Units at 07/16/24 1655    insulin aspart (NovoLOG) injection (RAPID ACTING)  1-7 Units Subcutaneous Q4H Gloria Kerr PA-C   1 Units at 07/16/24 1419    lisinopril (ZESTRIL) tablet 40 mg  40 mg Oral Daily Aminata Jay PA-C   40 mg at 07/16/24 0949    metoprolol (LOPRESSOR) injection 5 mg  5 mg Intravenous Q6H Connor Lux MD   5 mg at 07/16/24 1225    piperacillin-tazobactam (ZOSYN) 3.375 g vial to attach to  mL bag  3.375 g Intravenous Q6H Aminata Jay PA-C   3.375 g at 07/16/24 1224    polyethylene glycol (MIRALAX) Packet 17 g  17 g Oral Daily Connor Lux MD   17 g at 07/16/24 0950    senna-docusate (SENOKOT-S/PERICOLACE) 8.6-50 MG per tablet 1 tablet  1 tablet Oral BID Connor Lux MD   1 tablet at 07/16/24 0948    simvastatin (ZOCOR) tablet 40 mg  40 mg Oral At Bedtime Gloria Kerr PA-C   40 mg at 07/15/24 2204    sodium chloride (PF) 0.9% PF flush 3 mL  3 mL Intracatheter Q8H Connor Lux MD   3 mL at 07/16/24 1655       Physical Exam:     Admit Weight: 60.8 kg (134 lb 0.6 oz)    Current vitals:   BP (!) 143/60 (BP Location: Left arm)   Pulse 64   Temp 98  F (36.7  C) (Oral)   Resp 18   Ht 1.613 m (5' 3.5\")   Wt 61.6 kg (135 lb 14.4 oz)   SpO2 100%   BMI 23.70 kg/m           Vital sign ranges:    Temp:  [97.6  F (36.4  C)-98.5  F (36.9  C)] 98  F (36.7  C)  Pulse:  [64-88] 64  Resp:  [16-21] 18  BP: (136-150)/(56-68) 143/60  SpO2:  [97 %-100 %] 100 %  Patient Vitals for the past 24 hrs:   BP Temp Temp src " Pulse Resp SpO2 Weight   07/16/24 1701 -- -- -- 64 -- -- --   07/16/24 1700 (!) 143/60 98  F (36.7  C) Oral -- 18 100 % --   07/16/24 1343 (!) 144/58 98.5  F (36.9  C) Oral 84 16 97 % --   07/16/24 1225 (!) 144/56 -- -- 67 -- -- --   07/16/24 0945 (!) 150/68 98  F (36.7  C) Oral 88 16 100 % --   07/16/24 0527 138/63 98.2  F (36.8  C) Oral 73 16 100 % 61.6 kg (135 lb 14.4 oz)   07/16/24 0159 (!) 144/66 97.6  F (36.4  C) Oral 67 18 100 % --   07/15/24 2143 136/61 97.6  F (36.4  C) Oral 77 21 98 % --   07/15/24 1810 (!) 149/63 98.5  F (36.9  C) Oral 69 21 97 % --     General Appearance: in no apparent distress.   Skin: normal, dry  Heart: perfused  Lungs: NLB on RA  Abdomen: The abdomen is flat, and  mildly tender to right abdomen. The wound is cdi to midline with staples. MILLI x 2 serosanguinous output. Biliary drain capped.  G/J tube capped.  : voiding  Extremities: edema: absent.   Neurologic: awake, alert, and oriented x4.     Data:   CMP  Recent Labs   Lab 07/16/24  1418 07/16/24  1008 07/16/24  0608 07/15/24  0949 07/15/24  0642 07/10/24  1610 07/10/24  1404 07/10/24  1302   NA  --   --  131*  --  130*   < > 136 135   POTASSIUM  --   --  4.2  --  4.0   < > 4.7 4.6   CHLORIDE  --   --  100  --  98   < >  --   --    CO2  --   --  22  --  23   < >  --   --    * 165* 162*   < > 185*   < > 131* 128*   BUN  --   --  10.0  --  9.1   < >  --   --    CR  --   --  0.54  --  0.54   < >  --   --    GFRESTIMATED  --   --  >90  --  >90   < >  --   --    CHEY  --   --  8.3*  --  8.4*   < >  --   --    ICAW  --   --   --   --   --   --  4.6 4.8   MAG  --   --  1.7  --  1.9   < >  --   --    PHOS  --   --  3.1  --  3.5   < >  --   --    ALBUMIN  --   --  2.7*  --  2.8*   < >  --   --    BILITOTAL  --   --  0.3  --  0.3   < >  --   --    ALKPHOS  --   --  198*  --  205*   < >  --   --    AST  --   --  37  --  45   < >  --   --    ALT  --   --  140*  --  217*   < >  --   --     < > = values in this interval not displayed.      CBC  Recent Labs   Lab 07/16/24  0608 07/15/24  0642   HGB 8.5* 9.3*   WBC 12.6* 11.6*    356     COAGS  Recent Labs   Lab 07/16/24  0608 07/15/24  0642 07/11/24  0546 07/10/24  1624   INR 1.04 1.07   < > 1.21*   PTT  --   --   --  29    < > = values in this interval not displayed.      Urinalysis  No lab results found.

## 2024-07-16 NOTE — CARE PLAN
"BP (!) 144/58 (BP Location: Left arm)   Pulse 84   Temp 98.5  F (36.9  C) (Oral)   Resp 16   Ht 1.613 m (5' 3.5\")   Wt 61.6 kg (135 lb 14.4 oz)   SpO2 97%   BMI 23.70 kg/m      Shift: 8912-8086  VS: slightly hypertensive otherwise all other VSS on RA, afebrile  Neuro: Aox4  Behaviors: calm and cooperative  BG: ACHS, 165, 149  Respiratory: diminished sounds at bases  Cardiac: WDL  Pain/Nausea: reports abd discomfort, denies nausea  Diet: NPO for biliary drain study   IV Access: RPIV  Lines/Drains: R ,L -abd drain, right biliary drain, G tube  GI/: voiding,  1 BM this shift -soft    Skin: midline incision stapled ANDRE  Mobility: Assist x1   Plan: continue plan of care , pt is scheduled for biliary drain study this afternoon. May likely go at 4pm        "

## 2024-07-16 NOTE — PROGRESS NOTES
Home Infusion  Received referral from [facility] for IV ABX.  Benefits verified.  Patient has MA secondary and is covered 100%.  Called and spoke with Sarah, dom to review home infusion services, review benefits and offer choice of providers.  Patient would like to remain in the [facility] system and will use \[facility]\"" for home infusion.  Confirmed phone and emergency contact information. Daughter denies recent illness (not related to pre-existing conditions) or travel in the house hold. Confirmed allergies. [facility] has been seeing the patient.     Sarah is willing to learn and manage home IV therapy.  Questions answered.  Plan for \[facility]\""  to find services after discharge.    \[facility]\"" will continue to follow until discharge and update pt once final orders are determined.    Thank you for the referral    Jhonny Velasquez LPN, Coordinator   [facility]   [email or web address]  Office: [phone]

## 2024-07-16 NOTE — PROGRESS NOTES
[id number]  Status: Patient admitted on 7/10, POD#6 s/p s/p Hepaticojejunostomy, Tamiko En Y, and ARABELLA  Vitals: VSS  Neuros: Intact ex patient blind and generalize weakness  IV: PIV saline locked between IV antibiotics  Labs/Electrolytes: LFT's elevated, Na 131  Resp/trach: Lung sounds clear throughout  Diet: Regular  Bowel status: Last BM today  : Voiding spontaneously  Skin: Midline abdominal incision with gildardo, MD Rosita POWELL at bedside this evening, removed both abdominal MILLI's and G-tube. Per MD RN to change dressings PRN when saturated. R biliary drain in place  Pain: Denies  Activity: Up with A1  Social: Cooperative with cares, daughter at bedside supportive  Plan: Discharge with IV antibiotics pending PICC placement, continue to monitor and follow POC

## 2024-07-17 ENCOUNTER — APPOINTMENT (OUTPATIENT)
Dept: PHYSICAL THERAPY | Facility: CLINIC | Age: 74
DRG: 329 | End: 2024-07-17
Attending: TRANSPLANT SURGERY
Payer: MEDICARE

## 2024-07-17 ENCOUNTER — APPOINTMENT (OUTPATIENT)
Dept: OCCUPATIONAL THERAPY | Facility: CLINIC | Age: 74
DRG: 329 | End: 2024-07-17
Attending: TRANSPLANT SURGERY
Payer: MEDICARE

## 2024-07-17 LAB
ALBUMIN SERPL BCG-MCNC: 2.7 G/DL (ref 3.5–5.2)
ALP SERPL-CCNC: 212 U/L (ref 40–150)
ALT SERPL W P-5'-P-CCNC: 103 U/L (ref 0–50)
ANION GAP SERPL CALCULATED.3IONS-SCNC: 9 MMOL/L (ref 7–15)
AST SERPL W P-5'-P-CCNC: 28 U/L (ref 0–45)
BACTERIA BLD CULT: NO GROWTH
BACTERIA BLD CULT: NO GROWTH
BACTERIA FLD CULT: NORMAL
BILIRUB SERPL-MCNC: 0.2 MG/DL
BUN SERPL-MCNC: 10.7 MG/DL (ref 8–23)
CALCIUM SERPL-MCNC: 8.6 MG/DL (ref 8.8–10.4)
CHLORIDE SERPL-SCNC: 101 MMOL/L (ref 98–107)
CREAT SERPL-MCNC: 0.58 MG/DL (ref 0.51–0.95)
EGFRCR SERPLBLD CKD-EPI 2021: >90 ML/MIN/1.73M2
ERYTHROCYTE [DISTWIDTH] IN BLOOD BY AUTOMATED COUNT: 17.2 % (ref 10–15)
GLUCOSE BLDC GLUCOMTR-MCNC: 180 MG/DL (ref 70–99)
GLUCOSE BLDC GLUCOMTR-MCNC: 196 MG/DL (ref 70–99)
GLUCOSE BLDC GLUCOMTR-MCNC: 216 MG/DL (ref 70–99)
GLUCOSE BLDC GLUCOMTR-MCNC: 233 MG/DL (ref 70–99)
GLUCOSE BLDC GLUCOMTR-MCNC: 258 MG/DL (ref 70–99)
GLUCOSE SERPL-MCNC: 197 MG/DL (ref 70–99)
HCO3 SERPL-SCNC: 21 MMOL/L (ref 22–29)
HCT VFR BLD AUTO: 27.4 % (ref 35–47)
HGB BLD-MCNC: 8.7 G/DL (ref 11.7–15.7)
INR PPP: 1.05 (ref 0.85–1.15)
MAGNESIUM SERPL-MCNC: 1.7 MG/DL (ref 1.7–2.3)
MCH RBC QN AUTO: 25.5 PG (ref 26.5–33)
MCHC RBC AUTO-ENTMCNC: 31.8 G/DL (ref 31.5–36.5)
MCV RBC AUTO: 80 FL (ref 78–100)
PHOSPHATE SERPL-MCNC: 3.7 MG/DL (ref 2.5–4.5)
PLATELET # BLD AUTO: 426 10E3/UL (ref 150–450)
POTASSIUM SERPL-SCNC: 4.4 MMOL/L (ref 3.4–5.3)
PROT SERPL-MCNC: 5.4 G/DL (ref 6.4–8.3)
RBC # BLD AUTO: 3.41 10E6/UL (ref 3.8–5.2)
SODIUM SERPL-SCNC: 131 MMOL/L (ref 135–145)
WBC # BLD AUTO: 13 10E3/UL (ref 4–11)

## 2024-07-17 PROCEDURE — 84100 ASSAY OF PHOSPHORUS: CPT | Performed by: TRANSPLANT SURGERY

## 2024-07-17 PROCEDURE — 85027 COMPLETE CBC AUTOMATED: CPT | Performed by: PHYSICIAN ASSISTANT

## 2024-07-17 PROCEDURE — L1951 AFO SPIRAL PREFABRICATED: HCPCS

## 2024-07-17 PROCEDURE — 85610 PROTHROMBIN TIME: CPT | Performed by: PHYSICIAN ASSISTANT

## 2024-07-17 PROCEDURE — 97530 THERAPEUTIC ACTIVITIES: CPT | Mod: GP

## 2024-07-17 PROCEDURE — 120N000011 HC R&B TRANSPLANT UMMC

## 2024-07-17 PROCEDURE — 250N000011 HC RX IP 250 OP 636: Performed by: PHYSICIAN ASSISTANT

## 2024-07-17 PROCEDURE — 250N000013 HC RX MED GY IP 250 OP 250 PS 637: Performed by: STUDENT IN AN ORGANIZED HEALTH CARE EDUCATION/TRAINING PROGRAM

## 2024-07-17 PROCEDURE — 250N000013 HC RX MED GY IP 250 OP 250 PS 637: Performed by: PHYSICIAN ASSISTANT

## 2024-07-17 PROCEDURE — 250N000011 HC RX IP 250 OP 636: Performed by: NURSE PRACTITIONER

## 2024-07-17 PROCEDURE — 250N000013 HC RX MED GY IP 250 OP 250 PS 637: Performed by: NURSE PRACTITIONER

## 2024-07-17 PROCEDURE — 250N000013 HC RX MED GY IP 250 OP 250 PS 637: Performed by: TRANSPLANT SURGERY

## 2024-07-17 PROCEDURE — 250N000011 HC RX IP 250 OP 636: Performed by: TRANSPLANT SURGERY

## 2024-07-17 PROCEDURE — 82040 ASSAY OF SERUM ALBUMIN: CPT | Performed by: PHYSICIAN ASSISTANT

## 2024-07-17 PROCEDURE — 36415 COLL VENOUS BLD VENIPUNCTURE: CPT | Performed by: PHYSICIAN ASSISTANT

## 2024-07-17 PROCEDURE — 97116 GAIT TRAINING THERAPY: CPT | Mod: GP

## 2024-07-17 PROCEDURE — 250N000009 HC RX 250: Performed by: STUDENT IN AN ORGANIZED HEALTH CARE EDUCATION/TRAINING PROGRAM

## 2024-07-17 PROCEDURE — 83735 ASSAY OF MAGNESIUM: CPT | Performed by: TRANSPLANT SURGERY

## 2024-07-17 PROCEDURE — 99024 POSTOP FOLLOW-UP VISIT: CPT | Performed by: TRANSPLANT SURGERY

## 2024-07-17 PROCEDURE — 99232 SBSQ HOSP IP/OBS MODERATE 35: CPT | Performed by: INTERNAL MEDICINE

## 2024-07-17 PROCEDURE — 97535 SELF CARE MNGMENT TRAINING: CPT | Mod: GO

## 2024-07-17 RX ORDER — GLIMEPIRIDE 1 MG/1
1 TABLET ORAL
Status: DISCONTINUED | OUTPATIENT
Start: 2024-07-18 | End: 2024-07-18 | Stop reason: HOSPADM

## 2024-07-17 RX ORDER — CIPROFLOXACIN 500 MG/1
500 TABLET, FILM COATED ORAL EVERY 12 HOURS SCHEDULED
Status: DISCONTINUED | OUTPATIENT
Start: 2024-07-18 | End: 2024-07-18 | Stop reason: HOSPADM

## 2024-07-17 RX ORDER — METFORMIN HCL 500 MG
1000 TABLET, EXTENDED RELEASE 24 HR ORAL 2 TIMES DAILY WITH MEALS
Status: DISCONTINUED | OUTPATIENT
Start: 2024-07-17 | End: 2024-07-18 | Stop reason: HOSPADM

## 2024-07-17 RX ADMIN — INSULIN ASPART 2 UNITS: 100 INJECTION, SOLUTION INTRAVENOUS; SUBCUTANEOUS at 01:43

## 2024-07-17 RX ADMIN — INSULIN ASPART 1 UNITS: 100 INJECTION, SOLUTION INTRAVENOUS; SUBCUTANEOUS at 06:24

## 2024-07-17 RX ADMIN — GABAPENTIN 600 MG: 300 CAPSULE ORAL at 19:41

## 2024-07-17 RX ADMIN — GABAPENTIN 600 MG: 300 CAPSULE ORAL at 09:06

## 2024-07-17 RX ADMIN — HEPARIN SODIUM 5000 UNITS: 5000 INJECTION, SOLUTION INTRAVENOUS; SUBCUTANEOUS at 00:34

## 2024-07-17 RX ADMIN — LISINOPRIL 40 MG: 40 TABLET ORAL at 09:06

## 2024-07-17 RX ADMIN — SIMVASTATIN 40 MG: 40 TABLET, FILM COATED ORAL at 22:35

## 2024-07-17 RX ADMIN — POLYETHYLENE GLYCOL 3350 17 G: 17 POWDER, FOR SOLUTION ORAL at 09:10

## 2024-07-17 RX ADMIN — FAMOTIDINE 20 MG: 20 TABLET ORAL at 19:41

## 2024-07-17 RX ADMIN — AMITRIPTYLINE HYDROCHLORIDE 10 MG: 10 TABLET, FILM COATED ORAL at 22:35

## 2024-07-17 RX ADMIN — PIPERACILLIN AND TAZOBACTAM 3.38 G: 3; .375 INJECTION, POWDER, FOR SOLUTION INTRAVENOUS at 22:38

## 2024-07-17 RX ADMIN — PIPERACILLIN AND TAZOBACTAM 3.38 G: 3; .375 INJECTION, POWDER, FOR SOLUTION INTRAVENOUS at 05:16

## 2024-07-17 RX ADMIN — HEPARIN SODIUM 5000 UNITS: 5000 INJECTION, SOLUTION INTRAVENOUS; SUBCUTANEOUS at 16:46

## 2024-07-17 RX ADMIN — PIPERACILLIN AND TAZOBACTAM 3.38 G: 3; .375 INJECTION, POWDER, FOR SOLUTION INTRAVENOUS at 11:18

## 2024-07-17 RX ADMIN — HEPARIN SODIUM 5000 UNITS: 5000 INJECTION, SOLUTION INTRAVENOUS; SUBCUTANEOUS at 09:12

## 2024-07-17 RX ADMIN — METFORMIN ER 500 MG 1000 MG: 500 TABLET ORAL at 18:23

## 2024-07-17 RX ADMIN — PIPERACILLIN AND TAZOBACTAM 3.38 G: 3; .375 INJECTION, POWDER, FOR SOLUTION INTRAVENOUS at 16:47

## 2024-07-17 RX ADMIN — METOPROLOL TARTRATE 5 MG: 5 INJECTION INTRAVENOUS at 05:16

## 2024-07-17 RX ADMIN — FAMOTIDINE 20 MG: 20 TABLET ORAL at 09:06

## 2024-07-17 RX ADMIN — DULOXETINE HYDROCHLORIDE 60 MG: 60 CAPSULE, DELAYED RELEASE ORAL at 09:06

## 2024-07-17 ASSESSMENT — ACTIVITIES OF DAILY LIVING (ADL)
ADLS_ACUITY_SCORE: 42
ADLS_ACUITY_SCORE: 41
ADLS_ACUITY_SCORE: 42
ADLS_ACUITY_SCORE: 42
ADLS_ACUITY_SCORE: 41
ADLS_ACUITY_SCORE: 42
ADLS_ACUITY_SCORE: 41
ADLS_ACUITY_SCORE: 42
ADLS_ACUITY_SCORE: 41
ADLS_ACUITY_SCORE: 42
ADLS_ACUITY_SCORE: 41
ADLS_ACUITY_SCORE: 41

## 2024-07-17 NOTE — PROGRESS NOTES
GENERAL INFECTIOUS DISEASES PROGRESS NOTE - GREEN TEAM     Patient:  Rocío Russell   Date of birth 1950, Medical record number [id number]  Date of Visit:  07/17/2024  Date of Admission: 7/10/2024  Consult Requested by:Alejandro Becker MD  Reason for Consult:  Polymicrobial bacteremia , antibiotic guidance and duration          Assessment and Recommendations:     Rocío Russell is a 73 year old female with a past medical history of HTN, HLD, h/o DVT, anemia, tobacco use, GERD, type 2 diabetes, and h/o symptomatic cholelithiasis s/p laparoscopic cholecystectomy (12/2023) complicated by common bile duct stricture s/p percutaneous transhepatic biliary drain placement, G-tube placement and MILLI drain placement (exchanged on 5/10/2024). Patient was admitted on 7/10/2024 and underwent planned Tamiko-en-Y hepaticojejunostomy and Lysis of adhesions. Intra op noted purulent material in the bile ducts and specimen was sent for gram stain and culture. fluid culture grew 4+ Citrobacter koseri, 4+ Klebsiella oxytoca 4+ Enterococcus gallinarum and 4+ Enterococcus faecalis. She was on Pip/tazobactam, Vancomycin IV and fluconazole on 7/10.     Later that day, patient had a fever of 100.4F and WBC increased to 20.3 from 11.5 on 7/9/24. Blood cultures were obtained and later came back positive for Citrobacter koseri and Enterococcus faecalis . Pip/tazobactam was discontinued and  switched to Imipenem- cilastatin on 7/11.    Vanco IV was discontinued on 7/11 and Fluconazole was discontinued on 7/13/24.     ASSESSMENT:  Bacteremia (Citrobacter koseri, Enetroccous gallinarum and Enterococcus faecalis) - 7/10/24  - blood cx - neg on 7/12 and 7/13 so far   Purulence in common bile duct - 1/0/24  H/o symptomatic cholelithiasis s/p laparoscopic cholecystectomy (12/2023) complicated by common bile duct stricture s/p percutaneous transhepatic biliary drain placement, G-tube placement and MILLI drain placement (exchanged on  5/10/2024).  -     Tamiko-en-Y hepaticojejunostomy and Lysis of adhesions - 7/10/24   Leukocytosis - improving     RECOMMENDATION:  - continue  Pip/ tazo today to complete 7 days of treatment then switch to Ciprofloxacin 500 mg po q12 hr for another 7 days.   - bacteremia cleared on 7/12/24   - no Picc line needed if able to tolerate oral Cipro.     Video follow-up with me on 7/23/24 at 4.30 PM     ID will sign off now.  Discussed Recommendations with Primary team     Luis Pennington MD, M.Med.Sc  Staff, Infectious Diseases       Interval History  hepatic biliary scan did not show any biliary leak. all drains have been removed   feeling better and appetite has improved            History of Present Illness:     Rocío Russell is a 73 year old female with a past medical history of HTN, HLD, h/o DVT, anemia, tobacco use, GERD, type 2 diabetes, and h/o symptomatic cholelithiasis s/p laparoscopic cholecystectomy (12/2023) complicated by common bile duct stricture s/p percutaneous transhepatic biliary drain placement, G-tube placement and MILLI drain placement (exchanged on 5/10/2024). Patient was admitted on 7/10/2024 and underwent planned Tamiko-en-Y hepaticojejunostomy and Lysis of adhesions. Intra op noted purulent material in the bile ducts and specimen was sent for gram stain and culture. fluid culture grew 4+ Citrobacter koseri, 4+ Klebsiella oxytoca 4+ Enterococcus gallinarum and 4+ Enterococcus faecalis. She was on Pip/tazobactam, Vancomycin IV and fluconazole on 7/10.     Later that day, patient had a fever of 100.4F and WBC increased to 20.3 from 11.5 on 7/9/24. Blood cultures were obtained and later came back positive for Citrobacter koseri and Enterococcus faecalis . Pip/tazobactam was discontinued and  switched to Imipenem- cilastatin on 7/11.    Vanco IV was discontinued on 7/11 and Fluconazole was discontinued on 7/13/24.     Repeat blood cultures on 7/12 - remain negative x 2 , 7/13 - negative x 2    She is afebrile with WBC trending down to 11.6 today ( 7/15/24)     she has mild pain around the drains and had a bowel movement today. She is currently NPO for a procedure. no nausea, vomiting   Otherwise, she is feeling better and has been ambulating.    no fever, or signs of infection prior to admission.     Antimicrobials  Pip/Tazo 7/15-present     Imipenem 7/12 - 7/15  Pip/tazo 7/10-7/11  Vancomycin 7/10-7/11   Fluconazole 7/10-7/13          Review of Systems:   CONSTITUTIONAL:  see HPI  EYES: negative for icterus  ENT:  negative for hearing loss, tinnitus and sore throat  RESPIRATORY:  negative for cough with sputum and dyspnea  CARDIOVASCULAR:  negative for chest pain, dyspnea  GASTROINTESTINAL:  see HPI  GENITOURINARY:  negative for dysuria  HEME:  No easy bruising  INTEGUMENT:  negative for rash and pruritus  NEURO:  Negative for headache         Past Medical History:     Past Medical History:   Diagnosis Date    Closed fracture of one or more phalanges of foot     Diabetes mellitus (H)     Foot drop     Gastroesophageal reflux disease with esophagitis     HLD (hyperlipidemia)     HTN (hypertension)     Injury of bile duct     Osteopenia     Personal history of DVT (deep vein thrombosis)     Sciatica, unspecified laterality     Tobacco use           Past Surgical History:     Past Surgical History:   Procedure Laterality Date    BREAST LUMPECTOMY W/ NEEDLE LOCALIZATION      CARPAL TUNNEL RELEASE RT/LT Bilateral     CHOLECYSTECTOMY      EXPLORATORY LAPAROTOMY, EVACUATION OF TWO LITERS OF FLUID CONTAINING BLOOD AND BILE, PERITONEAL LAVAGE, DRAINAGE OF RIGHT KIDNEY CYST, PLACEMENT OF DRAIN, INSERTION OF GASTROSTOMY FEEDING TUB      HEPATICOJEJUNOSTOMY N/A 7/10/2024    Procedure: HEPATICOJEJUNOSTOMY, Tamiko En-Y, Lysis of adhesions;  Surgeon: Alejandro Becker MD;  Location: [facility] OR    IR ERCP  12/18/2023    LUMBAR SPINE SURGERY      LAMINECTOMY DISCECTOMY    RIGHT OOPHORECTOMY              Family History:      Family History   Problem Relation Age of Onset    Anesthesia Reaction No family hx of     Deep Vein Thrombosis (DVT) No family hx of             Social History:     Social History     Tobacco Use    Smoking status: Every Day     Current packs/day: 0.20     Types: Cigarettes     Passive exposure: Current    Smokeless tobacco: Never    Tobacco comments:     5-8per day   Substance Use Topics    Alcohol use: Not Currently     History   Sexual Activity    Sexual activity: Not on file            Current Medications (antimicrobials listed in bold):     Current Facility-Administered Medications   Medication Dose Route Frequency Provider Last Rate Last Admin    amitriptyline (ELAVIL) tablet 10 mg  10 mg Oral At Bedtime Alejandro Becker MD   10 mg at 07/16/24 2211    bisacodyl (DULCOLAX) suppository 10 mg  10 mg Rectal Daily Brigette Gill MD   10 mg at 07/15/24 0939    DULoxetine (CYMBALTA) DR capsule 60 mg  60 mg Oral Daily Alejandro Becker MD   60 mg at 07/17/24 0906    famotidine (PEPCID) tablet 20 mg  20 mg Oral BID Connor Lux MD   20 mg at 07/17/24 0906    gabapentin (NEURONTIN) capsule 600 mg  600 mg Oral BID Alejandro Becker MD   600 mg at 07/17/24 0906    heparin ANTICOAGULANT injection 5,000 Units  5,000 Units Subcutaneous Q8H Alejandro Becker MD   5,000 Units at 07/17/24 0912    insulin aspart (NovoLOG) injection (RAPID ACTING)  1-7 Units Subcutaneous Q4H Gloria Kerr PA-C   1 Units at 07/17/24 0624    lisinopril (ZESTRIL) tablet 40 mg  40 mg Oral Daily Aminata Jay PA-C   40 mg at 07/17/24 0906    piperacillin-tazobactam (ZOSYN) 3.375 g vial to attach to  mL bag  3.375 g Intravenous Q6H Aminata Jay PA-C   3.375 g at 07/17/24 0516    polyethylene glycol (MIRALAX) Packet 17 g  17 g Oral Daily Connor Lux MD   17 g at 07/17/24 0910    senna-docusate (SENOKOT-S/PERICOLACE) 8.6-50 MG per tablet 1 tablet  1 tablet Oral BID Connor Lux MD   1  tablet at 07/16/24 0948    simvastatin (ZOCOR) tablet 40 mg  40 mg Oral At Bedtime Gloria Kerr PA-C   40 mg at 07/16/24 2212    sodium chloride (PF) 0.9% PF flush 3 mL  3 mL Intracatheter Q8H Connor Lux MD   10 mL at 07/17/24 1008            Allergies:     Allergies   Allergen Reactions    Animal Dander Other (See Comments)     Runny nose/watery eyes    Nickel Rash    Perfume Headache    Codeine Other (See Comments) and Unknown     Other reaction(s): Nightmares   Other reaction(s): Nightmares    Other reaction(s): Nightmares    Sulfa Antibiotics      Eye drops            Physical Exam:   Vitals were reviewed  Patient Vitals for the past 24 hrs:   BP Temp Temp src Pulse Resp SpO2   07/17/24 1013 (!) 163/76 98  F (36.7  C) Oral 60 16 97 %   07/17/24 0500 (!) 149/61 98.2  F (36.8  C) Oral 66 16 97 %   07/17/24 0140 (!) 148/68 97.8  F (36.6  C) Oral 66 16 98 %   07/16/24 2208 133/60 97.9  F (36.6  C) Oral 112 16 --   07/16/24 1751 (!) 142/57 98  F (36.7  C) Oral 67 18 99 %   07/16/24 1701 -- -- -- 64 -- --   07/16/24 1700 (!) 143/60 98  F (36.7  C) Oral -- 18 100 %   07/16/24 1343 (!) 144/58 98.5  F (36.9  C) Oral 84 16 97 %   07/16/24 1225 (!) 144/56 -- -- 67 -- --     Ranges for his vital signs:  Temp:  [97.8  F (36.6  C)-98.5  F (36.9  C)] 98  F (36.7  C)  Pulse:  [] 60  Resp:  [16-18] 16  BP: (133-163)/(56-76) 163/76  SpO2:  [97 %-100 %] 97 %    Physical Examination:  GENERAL:  alert, oriented, sitting up in no acute distress.   HEENT:  Head is normocephalic, atraumatic   EYES:  Eyes have anicteric sclerae without conjunctival injection    NECK:  Supple. No  Cervical lymphadenopathy  LUNGS:  Clear to auscultation bilateral.   CARDIOVASCULAR:  Regular rate and rhythm with no murmurs  ABDOMEN:  Normal bowel sounds, soft, all drains have been removed   SKIN:  No acute rashes.  Line(s) are in place without any surrounding erythema or exudate.   Extremities : trace edema   NEUROLOGIC:  Grossly nonfocal.  Active x4 extremities         Laboratory Data:     Hematology Studies    Recent Labs   Lab Test 07/17/24  0521 07/16/24  0608 07/15/24  0642 07/14/24  0618 07/14/24  0104 07/13/24  0542   WBC 13.0* 12.6* 11.6* 12.0* 11.6* 13.7*   HGB 8.7* 8.5* 9.3* 8.6* 8.8* 8.5*   MCV 80 80 80 80 81 82    371 356 344 293 293     Metabolic Studies     Recent Labs   Lab Test 07/17/24  0521 07/16/24  0608 07/15/24  0642 07/14/24  0618 07/14/24  0104   * 131* 130* 129* 129*   POTASSIUM 4.4 4.2 4.0 4.1 4.3   CHLORIDE 101 100 98 100 101   CO2 21* 22 23 21* 21*   BUN 10.7 10.0 9.1 10.3 10.6   CR 0.58 0.54 0.54 0.52 0.52   GFRESTIMATED >90 >90 >90 >90 >90     Hepatic Studies    Recent Labs   Lab Test 07/17/24  0521 07/16/24  0608 07/15/24  0642 07/14/24  0618 07/13/24  0542 07/12/24  0600   BILITOTAL 0.2 0.3 0.3 0.3 0.4 0.4   ALKPHOS 212* 198* 205* 222* 210* 234*   ALBUMIN 2.7* 2.7* 2.8* 2.8* 2.8* 2.9*   AST 28 37 45 83* 216* 677*   * 140* 217* 351* 545* 906*     Hepatitis B Testing   Recent Labs   Lab Test 07/10/24  1435   HEPBANG Nonreactive

## 2024-07-17 NOTE — PROGRESS NOTES
Agree with notes  Transplant Surgery  Inpatient Daily Progress Note  07/17/2024    Assessment & Plan: Rocío Russell is a 73 year old female who developed a stricture of the CBD after cholecystectomy. Patient now s/p hepaticojejunostomy, Tamiko En Y, and ARABELLA on 7/10/24. Surgeon was Dr. Becker.     GI/Nutrition:   S/p hepaticojejunostomy 7/10/24: HIDA negative for leak on 7/16. Transaminases nearly normal, . Plan for biliary drain study outpatient in 1 week.  Constipation: Continue senna BID. Daily dulcolax.     Cardiorespiratory:   HTN: PTA Lisinopril, Lasix. SBP up to 140-150s.  HLD: PTA fenofibrate    Hematology: Hgb stable, 8-9    Fluid/Electrolytes:   Hyponatremia: Na 131. Euvolemic. Monitor.    Endocrine:   DM2: Restart metformin and glimepiride. Continue sliding scale insulin until discharge.    : voiding    Infectious disease:   Intra-abdominal infection: OR cultures + Citrobacter, Klebsiella, and Enterococcus gallinarum and Enterococcus faecalis. ID consulted.  Bacteremia: Cultures + Citrobacter and Enterococcus faecalis. ID consulted.  -Zosyn 7/10-11, 7/15-present  -Imipenem-Cilastatin 7/11-7/15  -Plan for Levaquin x1 week after discharge    Prophylaxis: Heparin subcutaneous, PPI    Activity: OOB to chair. Ambulate TID.    Disposition: 7A    DENIS/Fellow/Resident Provider: Nancy Olmos NP     Faculty: Alejandro Becker MD   __________________________________________________________________    Interval History: History is obtained from the patient  Overnight events: Still feels constipated. MILLI drains and G tube removed by surgeon last evening.    ROS:   A 10-point review of systems was negative except as noted above.    Meds:  Current Facility-Administered Medications   Medication Dose Route Frequency Provider Last Rate Last Admin    amitriptyline (ELAVIL) tablet 10 mg  10 mg Oral At Bedtime Alejandro Becker MD   10 mg at 07/16/24 2211    bisacodyl (DULCOLAX) suppository 10 mg  10 mg  "Rectal Daily Brigette Gill MD   10 mg at 07/15/24 0939    DULoxetine (CYMBALTA) DR capsule 60 mg  60 mg Oral Daily Alejandro Becker MD   60 mg at 07/17/24 0906    famotidine (PEPCID) tablet 20 mg  20 mg Oral BID Connor Lux MD   20 mg at 07/17/24 0906    gabapentin (NEURONTIN) capsule 600 mg  600 mg Oral BID Alejandro Becker MD   600 mg at 07/17/24 0906    [START ON 7/18/2024] glimepiride (AMARYL) tablet 1 mg  1 mg Oral Daily with breakfast Nancy Olmos APRN CNP        heparin ANTICOAGULANT injection 5,000 Units  5,000 Units Subcutaneous Q8H Alejandro Becker MD   5,000 Units at 07/17/24 0912    insulin aspart (NovoLOG) injection (RAPID ACTING)  1-7 Units Subcutaneous TID AC Nancy Olmos APRN CNP   2 Units at 07/17/24 1221    insulin aspart (NovoLOG) injection (RAPID ACTING)  1-5 Units Subcutaneous At Bedtime Nancy Olmos APRN CNP        lisinopril (ZESTRIL) tablet 40 mg  40 mg Oral Daily Aminata Jay PA-C   40 mg at 07/17/24 0906    metFORMIN (GLUCOPHAGE XR) 24 hr tablet 1,000 mg  1,000 mg Oral BID w/meals Nancy Olmos APRN CNP        piperacillin-tazobactam (ZOSYN) 3.375 g vial to attach to  mL bag  3.375 g Intravenous Q6H Aminata Jay PA-C   3.375 g at 07/17/24 1118    polyethylene glycol (MIRALAX) Packet 17 g  17 g Oral Daily Connor Lux MD   17 g at 07/17/24 0910    senna-docusate (SENOKOT-S/PERICOLACE) 8.6-50 MG per tablet 1 tablet  1 tablet Oral BID Connor Lux MD   1 tablet at 07/16/24 0948    simvastatin (ZOCOR) tablet 40 mg  40 mg Oral At Bedtime Gloria Kerr PA-C   40 mg at 07/16/24 2212    sodium chloride (PF) 0.9% PF flush 3 mL  3 mL Intracatheter Q8H Connor Lux MD   10 mL at 07/17/24 1008       Physical Exam:     Admit Weight: 60.8 kg (134 lb 0.6 oz)    Current vitals:   BP (!) 147/68 (BP Location: Left arm)   Pulse 59   Temp 98.1  F (36.7  C) (Oral)   Resp 16   Ht 1.613 m (5' 3.5\")   Wt 61.6 " kg (135 lb 14.4 oz)   SpO2 97%   BMI 23.70 kg/m      Vital sign ranges:    Temp:  [97.8  F (36.6  C)-98.2  F (36.8  C)] 98.1  F (36.7  C)  Pulse:  [] 59  Resp:  [16-18] 16  BP: (133-163)/(57-76) 147/68  SpO2:  [97 %-100 %] 97 %  Patient Vitals for the past 24 hrs:   BP Temp Temp src Pulse Resp SpO2   07/17/24 1406 (!) 147/68 98.1  F (36.7  C) Oral 59 16 97 %   07/17/24 1013 (!) 163/76 98  F (36.7  C) Oral 60 16 97 %   07/17/24 0500 (!) 149/61 98.2  F (36.8  C) Oral 66 16 97 %   07/17/24 0140 (!) 148/68 97.8  F (36.6  C) Oral 66 16 98 %   07/16/24 2208 133/60 97.9  F (36.6  C) Oral 112 16 --   07/16/24 1751 (!) 142/57 98  F (36.7  C) Oral 67 18 99 %   07/16/24 1701 -- -- -- 64 -- --   07/16/24 1700 (!) 143/60 98  F (36.7  C) Oral -- 18 100 %     General Appearance: in no apparent distress.   Skin: normal, dry  Heart: perfused  Lungs: NLB on RA  Abdomen: The abdomen is rounded and  mildly tender to right abdomen. The wound is cdi to midline with staples. Biliary drain capped.   : voiding  Extremities: edema: absent.   Neurologic: awake, alert, and oriented x4.     Data:   CMP  Recent Labs   Lab 07/17/24  1212 07/17/24  0600 07/17/24  0521 07/16/24  1008 07/16/24  0608   NA  --   --  131*  --  131*   POTASSIUM  --   --  4.4  --  4.2   CHLORIDE  --   --  101  --  100   CO2  --   --  21*  --  22   * 180* 197*   < > 162*   BUN  --   --  10.7  --  10.0   CR  --   --  0.58  --  0.54   GFRESTIMATED  --   --  >90  --  >90   CHEY  --   --  8.6*  --  8.3*   MAG  --   --  1.7  --  1.7   PHOS  --   --  3.7  --  3.1   ALBUMIN  --   --  2.7*  --  2.7*   BILITOTAL  --   --  0.2  --  0.3   ALKPHOS  --   --  212*  --  198*   AST  --   --  28  --  37   ALT  --   --  103*  --  140*    < > = values in this interval not displayed.     CBC  Recent Labs   Lab 07/17/24  0521 07/16/24  0608   HGB 8.7* 8.5*   WBC 13.0* 12.6*    371     COAGS  Recent Labs   Lab 07/17/24  0521 07/16/24  0608 07/11/24  0546 07/10/24  5763    INR 1.05 1.04   < > 1.21*   PTT  --   --   --  29    < > = values in this interval not displayed.      Urinalysis  No lab results found.

## 2024-07-17 NOTE — PROGRESS NOTES
GENERAL INFECTIOUS DISEASES CONSULTATION - GREEN TEAM     Patient:  Rocío Russell   Date of birth 1950, Medical record number [id number]  Date of Visit:  07/16/2024  Date of Admission: 7/10/2024  Consult Requested by:Alejandro Becker MD  Reason for Consult:  Polymicrobial bacteremia , antibiotic guidance and duration          Assessment and Recommendations:     Rocío Russell is a 73 year old female with a past medical history of HTN, HLD, h/o DVT, anemia, tobacco use, GERD, type 2 diabetes, and h/o symptomatic cholelithiasis s/p laparoscopic cholecystectomy (12/2023) complicated by common bile duct stricture s/p percutaneous transhepatic biliary drain placement, G-tube placement and MILLI drain placement (exchanged on 5/10/2024). Patient was admitted on 7/10/2024 and underwent planned Tamiko-en-Y hepaticojejunostomy and Lysis of adhesions. Intra op noted purulent material in the bile ducts and specimen was sent for gram stain and culture. fluid culture grew 4+ Citrobacter koseri, 4+ Klebsiella oxytoca 4+ Enterococcus gallinarum and 4+ Enterococcus faecalis. She was on Pip/tazobactam, Vancomycin IV and fluconazole on 7/10.     Later that day, patient had a fever of 100.4F and WBC increased to 20.3 from 11.5 on 7/9/24. Blood cultures were obtained and later came back positive for Citrobacter koseri and Enterococcus faecalis . Pip/tazobactam was discontinued and  switched to Imipenem- cilastatin on 7/11.    Vanco IV was discontinued on 7/11 and Fluconazole was discontinued on 7/13/24.     ASSESSMENT:  Bacteremia (Citrobacter koseri and Enterococcus faecalis) - 7/10/24  - blood cx - neg on 7/12 and 7/13 so far   Purulence in common bile duct - 1/0/24  H/o symptomatic cholelithiasis s/p laparoscopic cholecystectomy (12/2023) complicated by common bile duct stricture s/p percutaneous transhepatic biliary drain placement, G-tube placement and MILLI drain placement (exchanged on 5/10/2024).  -     Tamiko-en-Y  hepaticojejunostomy and Lysis of adhesions - 7/10/24   Leukocytosis - improving     RECOMMENDATION:  - continue  Pip/ tazo  - bacteremia cleared on 7/12/24   - duration of Pip/Tazobactam will be ~ 14 days from 7/12 ( first negative blood culture)   - no Picc line until blood cultures are negative x 72 hours     ID will continue to follow . Discussed Recommendations with Primary team     Luis Pennington MD, M.Med.Sc  Staff, Infectious Diseases       Interval History  hepatic biliary scan did not show any biliary leak.            History of Present Illness:     Rocío Russell is a 73 year old female with a past medical history of HTN, HLD, h/o DVT, anemia, tobacco use, GERD, type 2 diabetes, and h/o symptomatic cholelithiasis s/p laparoscopic cholecystectomy (12/2023) complicated by common bile duct stricture s/p percutaneous transhepatic biliary drain placement, G-tube placement and MILLI drain placement (exchanged on 5/10/2024). Patient was admitted on 7/10/2024 and underwent planned Tamiko-en-Y hepaticojejunostomy and Lysis of adhesions. Intra op noted purulent material in the bile ducts and specimen was sent for gram stain and culture. fluid culture grew 4+ Citrobacter koseri, 4+ Klebsiella oxytoca 4+ Enterococcus gallinarum and 4+ Enterococcus faecalis. She was on Pip/tazobactam, Vancomycin IV and fluconazole on 7/10.     Later that day, patient had a fever of 100.4F and WBC increased to 20.3 from 11.5 on 7/9/24. Blood cultures were obtained and later came back positive for Citrobacter koseri and Enterococcus faecalis . Pip/tazobactam was discontinued and  switched to Imipenem- cilastatin on 7/11.    Vanco IV was discontinued on 7/11 and Fluconazole was discontinued on 7/13/24.     Repeat blood cultures on 7/12 - remain negative x 2 , 7/13 - negative x 2   She is afebrile with WBC trending down to 11.6 today ( 7/15/24)     she has mild pain around the drains and had a bowel movement today. She is  currently NPO for a procedure. no nausea, vomiting   Otherwise, she is feeling better and has been ambulating.    no fever, or signs of infection prior to admission.     Antimicrobials  Imipenem 7/12 - present      Pip/tazo 7/10-7/11  Vancomycin 7/10-7/11   Fluconazole 7/10-7/13          Review of Systems:   CONSTITUTIONAL:  see HPI  EYES: negative for icterus  ENT:  negative for hearing loss, tinnitus and sore throat  RESPIRATORY:  negative for cough with sputum and dyspnea  CARDIOVASCULAR:  negative for chest pain, dyspnea  GASTROINTESTINAL:  see HPI  GENITOURINARY:  negative for dysuria  HEME:  No easy bruising  INTEGUMENT:  negative for rash and pruritus  NEURO:  Negative for headache         Past Medical History:     Past Medical History:   Diagnosis Date    Closed fracture of one or more phalanges of foot     Diabetes mellitus (H)     Foot drop     Gastroesophageal reflux disease with esophagitis     HLD (hyperlipidemia)     HTN (hypertension)     Injury of bile duct     Osteopenia     Personal history of DVT (deep vein thrombosis)     Sciatica, unspecified laterality     Tobacco use             Past Surgical History:     Past Surgical History:   Procedure Laterality Date    BREAST LUMPECTOMY W/ NEEDLE LOCALIZATION      CARPAL TUNNEL RELEASE RT/LT Bilateral     CHOLECYSTECTOMY      EXPLORATORY LAPAROTOMY, EVACUATION OF TWO LITERS OF FLUID CONTAINING BLOOD AND BILE, PERITONEAL LAVAGE, DRAINAGE OF RIGHT KIDNEY CYST, PLACEMENT OF DRAIN, INSERTION OF GASTROSTOMY FEEDING TUB      HEPATICOJEJUNOSTOMY N/A 7/10/2024    Procedure: HEPATICOJEJUNOSTOMY, Tamiko En-Y, Lysis of adhesions;  Surgeon: Alejandro Becker MD;  Location: [facility] OR    IR ERCP  12/18/2023    LUMBAR SPINE SURGERY      LAMINECTOMY DISCECTOMY    RIGHT OOPHORECTOMY              Family History:     Family History   Problem Relation Age of Onset    Anesthesia Reaction No family hx of     Deep Vein Thrombosis (DVT) No family hx of             Social History:      Social History     Tobacco Use    Smoking status: Every Day     Current packs/day: 0.20     Types: Cigarettes     Passive exposure: Current    Smokeless tobacco: Never    Tobacco comments:     5-8per day   Substance Use Topics    Alcohol use: Not Currently     History   Sexual Activity    Sexual activity: Not on file            Current Medications (antimicrobials listed in bold):     Current Facility-Administered Medications   Medication Dose Route Frequency Provider Last Rate Last Admin    amitriptyline (ELAVIL) tablet 10 mg  10 mg Oral At Bedtime Alejandro Becker MD   10 mg at 07/16/24 2211    bisacodyl (DULCOLAX) suppository 10 mg  10 mg Rectal Daily Brigette Gill MD   10 mg at 07/15/24 0939    DULoxetine (CYMBALTA) DR capsule 60 mg  60 mg Oral Daily Alejandro Becker MD   60 mg at 07/16/24 0948    famotidine (PEPCID) tablet 20 mg  20 mg Oral BID Connor Lux MD   20 mg at 07/16/24 1949    gabapentin (NEURONTIN) capsule 600 mg  600 mg Oral BID Alejandro Becker MD   600 mg at 07/16/24 1949    heparin ANTICOAGULANT injection 5,000 Units  5,000 Units Subcutaneous Q8H Alejandro Becker MD   5,000 Units at 07/16/24 1655    insulin aspart (NovoLOG) injection (RAPID ACTING)  1-7 Units Subcutaneous Q4H Gloria Kerr PA-C   4 Units at 07/16/24 2202    lisinopril (ZESTRIL) tablet 40 mg  40 mg Oral Daily Aminata Jay PA-C   40 mg at 07/16/24 0949    metoprolol (LOPRESSOR) injection 5 mg  5 mg Intravenous Q6H Connor Lux MD   5 mg at 07/16/24 2208    piperacillin-tazobactam (ZOSYN) 3.375 g vial to attach to  mL bag  3.375 g Intravenous Q6H Aminata Jay PA-C   3.375 g at 07/16/24 2210    polyethylene glycol (MIRALAX) Packet 17 g  17 g Oral Daily Connor Lux MD   17 g at 07/16/24 0950    senna-docusate (SENOKOT-S/PERICOLACE) 8.6-50 MG per tablet 1 tablet  1 tablet Oral BID Connor Lux MD   1 tablet at 07/16/24 0948    simvastatin (ZOCOR) tablet 40 mg   40 mg Oral At Bedtime Gloria Kerr PA-C   40 mg at 07/16/24 2212    sodium chloride (PF) 0.9% PF flush 3 mL  3 mL Intracatheter Q8H Connor Lux MD   3 mL at 07/16/24 1655            Allergies:     Allergies   Allergen Reactions    Animal Dander Other (See Comments)     Runny nose/watery eyes    Nickel Rash    Perfume Headache    Codeine Other (See Comments) and Unknown     Other reaction(s): Nightmares   Other reaction(s): Nightmares    Other reaction(s): Nightmares    Sulfa Antibiotics      Eye drops            Physical Exam:   Vitals were reviewed  Patient Vitals for the past 24 hrs:   BP Temp Temp src Pulse Resp SpO2 Weight   07/16/24 2208 133/60 97.9  F (36.6  C) Oral 112 16 -- --   07/16/24 1751 (!) 142/57 98  F (36.7  C) Oral 67 18 99 % --   07/16/24 1701 -- -- -- 64 -- -- --   07/16/24 1700 (!) 143/60 98  F (36.7  C) Oral -- 18 100 % --   07/16/24 1343 (!) 144/58 98.5  F (36.9  C) Oral 84 16 97 % --   07/16/24 1225 (!) 144/56 -- -- 67 -- -- --   07/16/24 0945 (!) 150/68 98  F (36.7  C) Oral 88 16 100 % --   07/16/24 0527 138/63 98.2  F (36.8  C) Oral 73 16 100 % 61.6 kg (135 lb 14.4 oz)   07/16/24 0159 (!) 144/66 97.6  F (36.4  C) Oral 67 18 100 % --     Ranges for his vital signs:  Temp:  [97.6  F (36.4  C)-98.5  F (36.9  C)] 97.9  F (36.6  C)  Pulse:  [] 112  Resp:  [16-18] 16  BP: (133-150)/(56-68) 133/60  SpO2:  [97 %-100 %] 99 %    Physical Examination:  GENERAL:  alert, oriented, sitting up in no acute distress.   HEENT:  Head is normocephalic, atraumatic   EYES:  Eyes have anicteric sclerae without conjunctival injection    ENT:  Oropharynx is moist without exudates or ulcers. Tongue is midline  NECK:  Supple. No  Cervical lymphadenopathy  LUNGS:  Clear to auscultation bilateral.   CARDIOVASCULAR:  Regular rate and rhythm with no murmurs  ABDOMEN:  Normal bowel sounds, soft, mildly tender around the drains . 2 MILLI drains, biliary drain capped   SKIN:  No acute rashes.  Line(s) are in  place without any surrounding erythema or exudate. No stigmata of endocarditis.  Extremities : trace edema   NEUROLOGIC:  Grossly nonfocal. Active x4 extremities         Laboratory Data:     Hematology Studies    Recent Labs   Lab Test 07/16/24  0608 07/15/24  0642 07/14/24 0618 07/14/24 0104 07/13/24  0542 07/12/24  0600   WBC 12.6* 11.6* 12.0* 11.6* 13.7* 17.8*   HGB 8.5* 9.3* 8.6* 8.8* 8.5* 9.2*   MCV 80 80 80 81 82 79    356 344 293 293 319     Metabolic Studies     Recent Labs   Lab Test 07/16/24  0608 07/15/24  0642 07/14/24  0618 07/14/24  0104 07/13/24  0542   * 130* 129* 129* 130*   POTASSIUM 4.2 4.0 4.1 4.3 4.3   CHLORIDE 100 98 100 101 100   CO2 22 23 21* 21* 21*   BUN 10.0 9.1 10.3 10.6 9.3   CR 0.54 0.54 0.52 0.52 0.46*   GFRESTIMATED >90 >90 >90 >90 >90     Hepatic Studies    Recent Labs   Lab Test 07/16/24  0608 07/15/24  0642 07/14/24  0618 07/13/24  0542 07/12/24  0600 07/11/24  0546   BILITOTAL 0.3 0.3 0.3 0.4 0.4 0.6   ALKPHOS 198* 205* 222* 210* 234* 272*   ALBUMIN 2.7* 2.8* 2.8* 2.8* 2.9* 3.0*   AST 37 45 83* 216* 677* 2,323*   * 217* 351* 545* 906* 1,343*     Hepatitis B Testing   Recent Labs   Lab Test 07/10/24  1435   HEPBANG Nonreactive

## 2024-07-17 NOTE — PLAN OF CARE
Goal Outcome Evaluation:  [id number] Nursing Shift Report:  Kimberlyn appeared to sleep soundly throughout the shift. Up with assist of 1 to BSC. Daughter is present and has been helping with her cares. Abdominal midline incision with staples ANDRE. Did change one of her old MILLI dressing sites due to leaking serous fluid. Continues on IV Zosyn q 6hrs. Will continue to monitor and report any significant changes.

## 2024-07-17 NOTE — PROGRESS NOTES
"BP (!) 147/68 (BP Location: Left arm)   Pulse 59   Temp 98.1  F (36.7  C) (Oral)   Resp 16   Ht 1.613 m (5' 3.5\")   Wt 61.6 kg (135 lb 14.4 oz)   SpO2 97%   BMI 23.70 kg/m      Shift: 0826-4350  Isolation Status: none  VS: stable on room air, afebrile  Neuro: Aox4  Behaviors: calm, cooperative, pleasant  BG: ACHS: 196, 180, 216  Respiratory: WDL; diminished lung sounds at bases - IS use encouraged  Cardiac: HTN; systolic up to 163  Pain/Nausea: denies nausea, low pain levels (3)  PRN: none  Diet: regular  IV Access: R PIV  Infusion(s): Zosyn  Lines/Drains: R biliary drain, G tube  GI/: voiding, x2 BMs this shift; large and soft  Skin: midline incision stapled and ANDRE, Jps removed & covered with dressings  Mobility: assist x1 w walker  Plan: Expected discharge tomorrow  "

## 2024-07-17 NOTE — CONSULTS
IR consult.    Pt is scheduled for biliary tube check 7/24. This is reflected in EPIC appts.    Basia Devine DNP, APRN  Interventional Radiology   IR on-call pager: [phone]

## 2024-07-17 NOTE — PLAN OF CARE
Problem: Adult Inpatient Plan of Care  Goal: Plan of Care Review  Description: The Plan of Care Review/Shift note should be completed every shift.  The Outcome Evaluation is a brief statement about your assessment that the patient is improving, declining, or no change.  This information will be displayed automatically on your shift  note.  Outcome: Progressing  Goal: Optimal Comfort and Wellbeing  Outcome: Progressing  Goal: Readiness for Transition of Care  Outcome: Progressing  Flowsheets (Taken 7/17/2024 1022)  Concerns to be Addressed: discharge planning  Intervention: Mutually Develop Transition Plan  Recent Flowsheet Documentation  Taken 7/17/2024 1022 by Jolie Luna  Concerns to be Addressed: discharge planning     Problem: Risk for Delirium  Goal: Optimal Coping  Outcome: Progressing  Intervention: Optimize Psychosocial Adjustment to Delirium  Recent Flowsheet Documentation  Taken 7/17/2024 0920 by Jolie Luna  Supportive Measures:   self-care encouraged   positive reinforcement provided  Goal: Improved Attention and Thought Clarity  Outcome: Progressing  Intervention: Maximize Cognitive Function  Recent Flowsheet Documentation  Taken 7/17/2024 0920 by Jolie Luna  Sensory Stimulation Regulation:   care clustered   quiet environment promoted  Reorientation Measures:   clock in view   calendar in view  Goal: Improved Sleep  Outcome: Progressing     Problem: Adult Inpatient Plan of Care  Goal: Absence of Hospital-Acquired Illness or Injury  Intervention: Prevent Skin Injury  Recent Flowsheet Documentation  Taken 7/17/2024 0920 by Jolie Luna  Body Position: position changed independently  Intervention: Prevent and Manage VTE (Venous Thromboembolism) Risk  Recent Flowsheet Documentation  Taken 7/17/2024 0920 by Jolie Luna  VTE Prevention/Management: SCDs off (sequential compression devices)   Goal Outcome Evaluation:

## 2024-07-17 NOTE — PROGRESS NOTES
S: Pt seen at [facility] room 7211 with daughter in room and reports of trying a carbon fiber AFO with PT earlier today. O: I see the EPIC order for the RT AFO due to foot drop post back surgery. I see she is size 9 shoe. I see she is slightly ankle valgus so an Hamzah Yohannes Walk on won't be optimum. A: She was fit with an SPRY step AFO size small RT and it was put in her shoe with no sock but the Superfeet insert was retained and laid over the footplate of the AFO. We talked about a bigger shoe and wearing a sock due to her diabetes and she and the daughter agreed. Instructions were given and seemed to be understood. P: FU PRN. G: The goal is to lend stability to the RT foot/ankle during ambulation.  Electronically signed Devante Sotry CPO, LPO.

## 2024-07-18 ENCOUNTER — APPOINTMENT (OUTPATIENT)
Dept: PHYSICAL THERAPY | Facility: CLINIC | Age: 74
DRG: 329 | End: 2024-07-18
Attending: TRANSPLANT SURGERY
Payer: MEDICARE

## 2024-07-18 ENCOUNTER — MYC MEDICAL ADVICE (OUTPATIENT)
Dept: INTERVENTIONAL RADIOLOGY/VASCULAR | Facility: CLINIC | Age: 74
End: 2024-07-18
Payer: MEDICARE

## 2024-07-18 VITALS
WEIGHT: 132.1 LBS | OXYGEN SATURATION: 100 % | HEART RATE: 115 BPM | DIASTOLIC BLOOD PRESSURE: 62 MMHG | SYSTOLIC BLOOD PRESSURE: 112 MMHG | HEIGHT: 64 IN | RESPIRATION RATE: 18 BRPM | BODY MASS INDEX: 22.55 KG/M2 | TEMPERATURE: 97.6 F

## 2024-07-18 PROBLEM — G89.18 ACUTE POST-OPERATIVE PAIN: Status: ACTIVE | Noted: 2024-07-18

## 2024-07-18 PROBLEM — E61.1 IRON DEFICIENCY: Status: ACTIVE | Noted: 2024-07-18

## 2024-07-18 PROBLEM — Z78.9 TAKES DAILY MULTIVITAMINS: Status: ACTIVE | Noted: 2024-07-18

## 2024-07-18 PROBLEM — E83.42 HYPOMAGNESEMIA: Status: ACTIVE | Noted: 2024-07-18

## 2024-07-18 LAB
ALBUMIN SERPL BCG-MCNC: 2.8 G/DL (ref 3.5–5.2)
ALP SERPL-CCNC: 228 U/L (ref 40–150)
ALT SERPL W P-5'-P-CCNC: 76 U/L (ref 0–50)
ANION GAP SERPL CALCULATED.3IONS-SCNC: 8 MMOL/L (ref 7–15)
AST SERPL W P-5'-P-CCNC: 25 U/L (ref 0–45)
BACTERIA BLD CULT: NO GROWTH
BACTERIA BLD CULT: NO GROWTH
BILIRUB SERPL-MCNC: 0.2 MG/DL
BUN SERPL-MCNC: 8.2 MG/DL (ref 8–23)
CALCIUM SERPL-MCNC: 8.7 MG/DL (ref 8.8–10.4)
CHLORIDE SERPL-SCNC: 102 MMOL/L (ref 98–107)
CREAT SERPL-MCNC: 0.71 MG/DL (ref 0.51–0.95)
EGFRCR SERPLBLD CKD-EPI 2021: 89 ML/MIN/1.73M2
ERYTHROCYTE [DISTWIDTH] IN BLOOD BY AUTOMATED COUNT: 17.4 % (ref 10–15)
GLUCOSE BLDC GLUCOMTR-MCNC: 164 MG/DL (ref 70–99)
GLUCOSE BLDC GLUCOMTR-MCNC: 175 MG/DL (ref 70–99)
GLUCOSE SERPL-MCNC: 178 MG/DL (ref 70–99)
HCO3 SERPL-SCNC: 22 MMOL/L (ref 22–29)
HCT VFR BLD AUTO: 27.8 % (ref 35–47)
HGB BLD-MCNC: 9 G/DL (ref 11.7–15.7)
INR PPP: 1.1 (ref 0.85–1.15)
MAGNESIUM SERPL-MCNC: 1.6 MG/DL (ref 1.7–2.3)
MCH RBC QN AUTO: 25.9 PG (ref 26.5–33)
MCHC RBC AUTO-ENTMCNC: 32.4 G/DL (ref 31.5–36.5)
MCV RBC AUTO: 80 FL (ref 78–100)
PHOSPHATE SERPL-MCNC: 3.7 MG/DL (ref 2.5–4.5)
PLATELET # BLD AUTO: 458 10E3/UL (ref 150–450)
POTASSIUM SERPL-SCNC: 4.4 MMOL/L (ref 3.4–5.3)
PROT SERPL-MCNC: 5.6 G/DL (ref 6.4–8.3)
RBC # BLD AUTO: 3.48 10E6/UL (ref 3.8–5.2)
SODIUM SERPL-SCNC: 132 MMOL/L (ref 135–145)
WBC # BLD AUTO: 13 10E3/UL (ref 4–11)

## 2024-07-18 PROCEDURE — 97530 THERAPEUTIC ACTIVITIES: CPT | Mod: GP

## 2024-07-18 PROCEDURE — 250N000013 HC RX MED GY IP 250 OP 250 PS 637: Performed by: TRANSPLANT SURGERY

## 2024-07-18 PROCEDURE — 83735 ASSAY OF MAGNESIUM: CPT | Performed by: TRANSPLANT SURGERY

## 2024-07-18 PROCEDURE — 85014 HEMATOCRIT: CPT | Performed by: PHYSICIAN ASSISTANT

## 2024-07-18 PROCEDURE — 36415 COLL VENOUS BLD VENIPUNCTURE: CPT | Performed by: PHYSICIAN ASSISTANT

## 2024-07-18 PROCEDURE — 84100 ASSAY OF PHOSPHORUS: CPT | Performed by: TRANSPLANT SURGERY

## 2024-07-18 PROCEDURE — 97116 GAIT TRAINING THERAPY: CPT | Mod: GP

## 2024-07-18 PROCEDURE — 99024 POSTOP FOLLOW-UP VISIT: CPT | Mod: FS | Performed by: NURSE PRACTITIONER

## 2024-07-18 PROCEDURE — 250N000013 HC RX MED GY IP 250 OP 250 PS 637: Performed by: NURSE PRACTITIONER

## 2024-07-18 PROCEDURE — 250N000013 HC RX MED GY IP 250 OP 250 PS 637: Performed by: PHYSICIAN ASSISTANT

## 2024-07-18 PROCEDURE — 250N000011 HC RX IP 250 OP 636: Performed by: TRANSPLANT SURGERY

## 2024-07-18 PROCEDURE — 85610 PROTHROMBIN TIME: CPT | Performed by: PHYSICIAN ASSISTANT

## 2024-07-18 PROCEDURE — 250N000013 HC RX MED GY IP 250 OP 250 PS 637: Performed by: STUDENT IN AN ORGANIZED HEALTH CARE EDUCATION/TRAINING PROGRAM

## 2024-07-18 PROCEDURE — 80053 COMPREHEN METABOLIC PANEL: CPT | Performed by: PHYSICIAN ASSISTANT

## 2024-07-18 RX ORDER — ACETAMINOPHEN 325 MG/1
650 TABLET ORAL EVERY 6 HOURS PRN
Status: SHIPPED
Start: 2024-07-18

## 2024-07-18 RX ORDER — MAGNESIUM OXIDE 400 MG/1
400 TABLET ORAL ONCE
Status: DISCONTINUED | OUTPATIENT
Start: 2024-07-18 | End: 2024-07-18 | Stop reason: HOSPADM

## 2024-07-18 RX ORDER — VIT C/E/CUPERIC/ZINC/LUTEIN 226-90-0.8
1 CAPSULE ORAL
Status: SHIPPED
Start: 2024-07-18

## 2024-07-18 RX ORDER — FERROUS SULFATE 325(65) MG
325 TABLET ORAL
Status: SHIPPED
Start: 2024-07-18

## 2024-07-18 RX ORDER — CIPROFLOXACIN 500 MG/1
500 TABLET, FILM COATED ORAL EVERY 12 HOURS
Qty: 14 TABLET | Refills: 0 | Status: ACTIVE | OUTPATIENT
Start: 2024-07-18 | End: 2024-07-25

## 2024-07-18 RX ORDER — IBUPROFEN 200 MG
1 CAPSULE ORAL
Status: SHIPPED
Start: 2024-07-18

## 2024-07-18 RX ORDER — MAGNESIUM GLUCONATE 27 MG(500)
250 TABLET ORAL
Status: SHIPPED
Start: 2024-07-18

## 2024-07-18 RX ADMIN — DULOXETINE HYDROCHLORIDE 60 MG: 60 CAPSULE, DELAYED RELEASE ORAL at 09:03

## 2024-07-18 RX ADMIN — METFORMIN ER 500 MG 1000 MG: 500 TABLET ORAL at 09:04

## 2024-07-18 RX ADMIN — LISINOPRIL 40 MG: 40 TABLET ORAL at 09:03

## 2024-07-18 RX ADMIN — GABAPENTIN 600 MG: 300 CAPSULE ORAL at 09:04

## 2024-07-18 RX ADMIN — HEPARIN SODIUM 5000 UNITS: 5000 INJECTION, SOLUTION INTRAVENOUS; SUBCUTANEOUS at 09:16

## 2024-07-18 RX ADMIN — GLIMEPIRIDE 1 MG: 1 TABLET ORAL at 09:04

## 2024-07-18 RX ADMIN — HEPARIN SODIUM 5000 UNITS: 5000 INJECTION, SOLUTION INTRAVENOUS; SUBCUTANEOUS at 00:15

## 2024-07-18 RX ADMIN — FAMOTIDINE 20 MG: 20 TABLET ORAL at 09:03

## 2024-07-18 RX ADMIN — CIPROFLOXACIN 500 MG: 500 TABLET ORAL at 09:04

## 2024-07-18 ASSESSMENT — ACTIVITIES OF DAILY LIVING (ADL)
ADLS_ACUITY_SCORE: 42

## 2024-07-18 NOTE — DISCHARGE SUMMARY
I evaluated the patient today.  I reviewed the discharge plan with the fellow, and agree with the final assessment and plan for discharge as noted in the discharge summary.  I personally spent  30 minutes or less  today on discharge activities for this patient.      Alejandro Becker MD, EVE  Professor of Surgery  [employer]  Surgical Director of Liver Transplant Program  Executive Medical Director of Pediatric Transplantation [facility]    Discharge Summary  Transplant Surgery    Date of Admission:  7/10/2024  Date of Discharge:  7/18/2024  Discharging Provider: CITLALY Corrales CNP / Tiburcio Nichols MD     Discharge Diagnoses   Principal Problem:    Biliary stricture (H28)  Active Problems:    Bile duct injury    Hypomagnesemia    Acute post-operative pain    History of Present Illness   Rocío Russell is a 73 year old female who developed a stricture of the CBD after cholecystectomy. Additional history of DM type 2 and HTN. Patient now s/p hepaticojejunostomy, Tamiko En Y, and ARABELLA on 7/10/24. Surgeon was Dr. Becker.     Hospital Course   GI/Nutrition:   S/p hepaticojejunostomy 7/10/24: HIDA negative for leak on 7/16/24. Transaminases nearly normal, . Plan for biliary drain study/PTC outpatient in 1 week.     Cardiorespiratory:   HTN: Continue home dose lisinopril.     Hematology:   Anemia of chronic disease: Hgb stable, 8-9     Fluid/Electrolytes:   Hyponatremia, mild: Unclear etiology. Improving.     Endocrine:   DM2: Restarted metformin and glimepiride prior to discharge. May have alterations in glucose with addition of Cipro. Patient will monitor with her CGM.     Infectious disease:   Intra-abdominal infection: OR cultures + Citrobacter, Klebsiella, and Enterococcus gallinarum and Enterococcus faecalis. ID consulted.  Bacteremia: Cultures + Citrobacter and Enterococcus faecalis. ID consulted.  -Zosyn 7/10-11,  7/15-7/18  -Imipenem-Cilastatin 7/11-7/15  -Plan for Cipro x1 week after discharge      Significant Results and Procedures   7/10/24  Procedure:      HEPATICOJEJUNOSTOMY, Tamiko En-Y, Lysis of adhesions, N/A - Abdomen  Upper midlin laparotomy  Hepaticojejunostomy   Omental flap creation  2 drains were placed one on the right abdomen posterior to the anastomosis  One on the left abdomen is on the left side anterior to the anastomosis     Surgeon:              * Alejandro Becker MD - Primary    Code Status   Full    Primary Care Physician   CITLALY Mckinney-CNP    Physical Exam   Temp: 97.6  F (36.4  C) Temp src: Oral BP: 112/62 Pulse: 115   Resp: 18 SpO2: 100 % O2 Device: None (Room air)    Vitals:    07/15/24 0748 07/16/24 0527 07/18/24 1020   Weight: 60.8 kg (134 lb 1.6 oz) 61.6 kg (135 lb 14.4 oz) 59.9 kg (132 lb 1.6 oz)     Vital Signs with Ranges  Temp:  [97.6  F (36.4  C)-98.3  F (36.8  C)] 97.6  F (36.4  C)  Pulse:  [] 115  Resp:  [16-18] 18  BP: (112-150)/(60-68) 112/62  SpO2:  [97 %-100 %] 100 %  I/O last 3 completed shifts:  In: 10 [I.V.:10]  Out: 2500 [Urine:2500]    General Appearance: in no apparent distress.   Skin: normal, dry  Heart: perfused  Lungs: NLB on RA  Abdomen: The abdomen is rounded and soft. The wound is cdi to midline with staples. Biliary drain capped.   : voiding  Extremities: edema: absent.   Neurologic: awake, alert, and oriented x4.     Time Spent on this Encounter   I, CITLALY Corrales CNP, personally saw the patient today and spent greater than 30 minutes discharging this patient.    Discharge Disposition   Discharged to home  Condition at discharge: Stable    Consultations This Hospital Stay   CARE MANAGEMENT / SOCIAL WORK IP CONSULT  OCCUPATIONAL THERAPY ADULT IP CONSULT  PHYSICAL THERAPY ADULT IP CONSULT  PHARMACY TO DOSE VANCO  NURSING TO CONSULT FOR VASCULAR ACCESS CARE IP CONSULT  NURSING TO CONSULT FOR VASCULAR ACCESS CARE IP CONSULT  NURSING TO  CONSULT FOR VASCULAR ACCESS CARE IP CONSULT  INFECTIOUS DISEASE GENERAL ADULT IP CONSULT  INTERVENTIONAL RADIOLOGY ADULT/PEDS IP CONSULT  SOCIAL WORK IP CONSULT  INTERVENTIONAL RADIOLOGY ADULT/PEDS IP CONSULT    Discharge Orders      IR Biliary Tube Check    hx of Tamiko-en-Y hepaticojejunostomy, Intraoperative cholangiogram under fluoroscopy with biliary drain placement 7/10, case discussed between Dr. Patrick and Dr. Becker, drain has reportedly been capped.     Reason for your hospital stay    Hepaticojejunostomy     Activity    Your activity upon discharge: Walk at least four times a day, lift no greater than 10 pounds for 8 weeks from the time of surgery.  No driving while taking narcotics or 3 weeks after surgery.     Adult [facility] Follow-up and recommended labs and tests    1. Dr. Becker, Transplant Clinic, schedule on Monday 7/22/24.  2. Interventional radiology biliary drain study/PTC scheduled on 7/24/24.     Resume Home Care Services     Monitor and record    blood glucose Pebbles monitor     When to contact your care team    Notify Transplant Surgery if you have uncontrolled pain, increased redness or drainage from your incision, fever greater than 101F, or yellowing of skin or eyes.     If you have URGENT concerns after office hours, please call the hospital switchboard at [phone] and ask to have the Liver Transplant Surgery Fellow paged. If you have a life-threatening emergency, go to the nearest emergency room.     Wound care and dressings    Instructions to care for your wound at home: If you have staples in place, they will be removed in clinic. Wash incision daily with soap and water. Do not soak or scrub.     Tubes and drains    You are going home with the following tubes or drains: Biliary drain. No need to flush drain while capped.     Orthotics, Mastectomy and Custom Compression Orders     Diet    Follow this diet upon discharge: Regular     Discharge Medications   Current Discharge  Medication List        START taking these medications    Details   ciprofloxacin (CIPRO) 500 MG tablet Take 1 tablet (500 mg) by mouth every 12 hours for 7 days  Qty: 14 tablet, Refills: 0    Associated Diagnoses: Intra-abdominal infection           CONTINUE these medications which have CHANGED    Details   acetaminophen (TYLENOL) 325 MG tablet Take 2 tablets (650 mg) by mouth every 6 hours as needed for pain    Associated Diagnoses: Intra-abdominal infection      calcium 600 MG tablet Take 1 tablet (600 mg) by mouth daily (with lunch)    Associated Diagnoses: Takes daily multivitamins      ferrous sulfate (FEROSUL) 325 (65 Fe) MG tablet Take 1 tablet (325 mg) by mouth daily (with lunch)    Associated Diagnoses: Iron deficiency      magnesium gluconate (MAGONATE) 500 MG tablet Take 0.5 tablets (250 mg) by mouth daily (with lunch)    Associated Diagnoses: Hypomagnesemia      Multiple Vitamins-Minerals (PRESERVISION/LUTEIN) CAPS Take 1 capsule by mouth daily (with lunch)    Associated Diagnoses: Takes daily multivitamins           CONTINUE these medications which have NOT CHANGED    Details   amitriptyline (ELAVIL) 10 MG tablet Take 10 mg by mouth at bedtime      aspirin 81 MG EC tablet Take 81 mg by mouth daily      calcium carbonate (TUMS) 500 MG chewable tablet Take 1 chew tab by mouth 3 times daily as needed      COLLAGEN PO Take by mouth 2 times daily      DULOXETINE HCL PO Take 60 mg by mouth every morning      fenofibrate (TRIGLIDE/LOFIBRA) 160 MG tablet Take 160 mg by mouth every morning      gabapentin (NEURONTIN) 600 MG tablet Take 600 mg by mouth 2 times daily      glimepiride (AMARYL) 1 MG tablet Take 1 mg by mouth every morning (before breakfast)      Glucosamine Sulfate 500 MG TABS Take 1,000 mg by mouth every morning      ibuprofen (ADVIL/MOTRIN) 200 MG tablet Take 200-400 mg by mouth every 6 hours as needed for pain      lisinopril (ZESTRIL) 40 MG tablet Take 40 mg by mouth every morning       loratadine (CLARITIN) 10 MG tablet Take 10 mg by mouth every morning      metFORMIN (GLUCOPHAGE XR) 500 MG 24 hr tablet Take 1,000 mg by mouth 2 times daily (with meals)      polyethylene glycol-propylene glycol (SYSTANE ULTRA) 0.4-0.3 % SOLN ophthalmic solution Place 1 drop into both eyes 2 times daily as needed for dry eyes      senna-docusate (SENOKOT-S/PERICOLACE) 8.6-50 MG tablet Take 1 tablet by mouth 3 times daily (with meals)      simvastatin (ZOCOR) 40 MG tablet Take 40 mg by mouth at bedtime      Vitamin D, Cholecalciferol, 10 MCG (400 UNIT) TABS Take 400 Units by mouth every other day      ACCU-CHEK GUIDE test strip TEST BLOOD SUGAR THREE TIMES DAILY      blood glucose monitoring (SOFTCLIX) lancets       Continuous Blood Gluc Sensor (FREESTYLE KATHERINE 2 SENSOR) [facility] APPLY ONE NEW SENSOR TO THE BACK OF THE UPPER ARM ONCE EVERY 14 DAYS AS DIRECTED, TO MONITOR BLOOD SUGAR CONTINUOUSLY.      furosemide (LASIX) 20 MG tablet Take 20 mg by mouth daily as needed           STOP taking these medications       oxyCODONE (ROXICODONE) 5 MG tablet Comments:   Reason for Stopping:             Allergies   Allergies   Allergen Reactions    Animal Dander Other (See Comments)     Runny nose/watery eyes    Nickel Rash    Perfume Headache    Codeine Other (See Comments) and Unknown     Other reaction(s): Nightmares   Other reaction(s): Nightmares    Other reaction(s): Nightmares    Sulfa Antibiotics      Eye drops     Data   Most Recent 3 CBC's:  Recent Labs   Lab Test 07/18/24  0549 07/17/24  0521 07/16/24  0608   WBC 13.0* 13.0* 12.6*   HGB 9.0* 8.7* 8.5*   MCV 80 80 80   * 426 371     Most Recent 3 BMP's:  Recent Labs   Lab Test 07/18/24  0913 07/18/24  0549 07/18/24  0309 07/17/24  0600 07/17/24  0521 07/16/24  1008 07/16/24  0608   NA  --  132*  --   --  131*  --  131*   POTASSIUM  --  4.4  --   --  4.4  --  4.2   CHLORIDE  --  102  --   --  101  --  100   CO2  --  22  --   --  21*  --  22   BUN  --  8.2  --   --   10.7  --  10.0   CR  --  0.71  --   --  0.58  --  0.54   ANIONGAP  --  8  --   --  9  --  9   CHEY  --  8.7*  --   --  8.6*  --  8.3*   * 178* 175*   < > 197*   < > 162*    < > = values in this interval not displayed.     Most Recent 2 LFT's:  Recent Labs   Lab Test 07/18/24  0549 07/17/24  0521   AST 25 28   ALT 76* 103*   ALKPHOS 228* 212*   BILITOTAL 0.2 0.2

## 2024-07-18 NOTE — PROGRESS NOTES
Care Management Discharge Note    Discharge Date: 07/18/2024       Discharge Disposition: Home    Discharge Services: Home Care    Discharge DME: Wheelchair, Walker    Discharge Transportation: family or friend will provide    Private pay costs discussed: Not applicable    Does the patient's insurance plan have a 3 day qualifying hospital stay waiver?  No    PAS Confirmation Code:    Patient/family educated on Medicare website which has current facility and service quality ratings: no    Education Provided on the Discharge Plan: Yes  Persons Notified of Discharge Plans:   Patient/Family in Agreement with the Plan: yes    Handoff Referral Completed: Yes    Additional Information:    Plan is for Kimberlyn to discharge home today.  She is currently open to home care.  NP to put in resumption orders.    Left voice mail for Jami with [facility] notifying of plan for discharge home today.    [facility], KACEY Farrell for   Ph: [phone].  P: HH fax is [phone]    She is now discharging home on oral abx.  [facility] liaison's informed.    Faxed discharge order with [facility] order.     Met with Kimberlyn and updated that I faxed discharge orders and left message for KACEY Farrell at [facility] about discharge.     Cynthia Fitzpatrick RN, BA   7/18/2024  Nurse Coordinator , [facility]     Social Work and Care Management Department       SEARCHABLE in [facility] - search CARE COORDINATOR       [place] ([phone]) Saturday & Sunday; ([phone]) [facility] Recognized Holidays     Units: 5A Onc 5201 - 5219 RNCC,  5A Onc 5220 thru 5240 RNCC, 5C OFFSERVICE 0929-5145 RNCC & 5C OFF SERVICE [id number] RNCC       Units: 6B Vocera, 6C Card 6401 thru 6420 RNCC, 6C Card 6502 thru 6514 RNCC & 6C Card 6515 thru 6519 RNCC        Units: 7A SOT RNCC Vocera, 7B Med Surg Vocera, 7C Med Surg 7401 thru 7418 RNCC & 7C Med Surg 7502 thru 7521 RNCC       Units: 6A Vocera & 4A CVICU Vocera, 4C MICU Vocera, and 4E SICU Vocera         Units: 5  Ortho Vocera & 5 Med Surg Vocera        Units: 6 Med Surg Vocera & 8 Med Surg Vocera

## 2024-07-18 NOTE — PLAN OF CARE
"BP (!) 150/60 (BP Location: Left arm)   Pulse 74   Temp 98.3  F (36.8  C) (Oral)   Resp 17   Ht 1.613 m (5' 3.5\")   Wt 61.6 kg (135 lb 14.4 oz)   SpO2 99%   BMI 23.70 kg/m      Shift: 4047-7718  Isolation Status: None  VS: VSS on RA, afebrile  Neuro: Aox4, able to make needs known  Behaviors: Calm, cooperative, pleasant  BG: ACHS - 258, 233  Labs/Imaging: Na 131, CO2 21, Ca 8.6, albumin 2.7, protein total 5.4, alk phos 212, , WBC 13.0, Hgb 8.7, hematocrit 27.4  Respiratory: WDL room air, lung sounds diminished in lower lobes  Cardiac: Hypertensive 150s/60s, no interventions needed at this time  Pain/Nausea: Denies  PRN: N/A  Diet: Regular diet, good appetite  LDA: R bili drain capped, R PIV SL  Infusion(s): Zosyn q6h  GI/: Voiding spontaneously without difficulty. Multiple BMs today, scheduled Senna held.  Skin: Midline abdominal incision stapled, ANDRE, no drainage. Old MILLI sites x 2 dressings CDI, R bili drain capped.  Mobility: Assist x 1 w/walker. Ambulated in hallways with daughter x 2.  Events/Education: Cares clustered, rest promoted. Daughter at bedside at all times, supportive of pt/involved in care.  Plan: Continue with plan of care and notify team with any changes. Possible discharge home tomorrow AM.  "

## 2024-07-18 NOTE — PLAN OF CARE
"VS: BP (!) 147/68 (BP Location: Left arm)   Pulse 75   Temp 98.2  F (36.8  C) (Oral)   Resp 17   Ht 1.613 m (5' 3.5\")   Wt 61.6 kg (135 lb 14.4 oz)   SpO2 99%   BMI 23.70 kg/m      Cares: 2300 - 0730     Neuro: Aox4   VS: hypertensive 140/60s. Afebrile. On RA - denies SOB.   GI/: voiding adequately w/out issues. LBM 7/18  Skin: midline abdominal incision stapled ANDRE. Left and right old MILLI sites - dressing C/D/I.   Diet: Regular   Labs: awaiting AM lab results   BG: ACHS + 2am (175)  LDA: Right PIV SL.   Mobility: Ax1 w/ walker   Pain/Nausea: minimal abdominal pain, denies nausea   PRN medications: none given   Events/Education: n/a  Plan of Care: plan to discharge home today w/ her daughter. Continue with current POC and update MD with any changes.   "

## 2024-07-18 NOTE — DISCHARGE SUMMARY
DISCHARGE:  D: Patient with orders to discharge to home.   I: Discharge instructions, medications, & follow ups reviewed with patient and daughter. Copy of discharge summary given to daughter. R PIV removed. All belongings packed & sent with patient. Medications filled & picked up at discharge pharmacy.   A: Patient in stable condition. AVSS. Patient and daughter had no further questions regarding discharge instructions and medications. Patient transferred out by transport & left with transport and daughter.   P: Plan for recovery at home with assistance of daughter.

## 2024-07-18 NOTE — PLAN OF CARE
Physical Therapy Discharge Summary    Reason for therapy discharge:    Discharged to home with home therapy.    Progress towards therapy goal(s). See goals on Care Plan in [facility] electronic health record for goal details.  Goals partially met.  Barriers to achieving goals:   discharge from facility.    Therapy recommendation(s):    Continued therapy is recommended.  Rationale/Recommendations:  Patient will benefit from further PT to continue to improve safety and independence with functional mobility.

## 2024-07-18 NOTE — PLAN OF CARE
Problem: Adult Inpatient Plan of Care  Goal: Optimal Comfort and Wellbeing  Outcome: Progressing  Goal: Readiness for Transition of Care  Outcome: Progressing  Flowsheets (Taken 7/18/2024 1049)  Concerns to be Addressed: discharge planning  Intervention: Mutually Develop Transition Plan  Recent Flowsheet Documentation  Taken 7/18/2024 1049 by Jolie Luna  Concerns to be Addressed: discharge planning     Problem: Risk for Delirium  Goal: Improved Attention and Thought Clarity  Outcome: Progressing  Intervention: Maximize Cognitive Function  Recent Flowsheet Documentation  Taken 7/18/2024 0946 by Jolie Luna  Sensory Stimulation Regulation: care clustered  Reorientation Measures:   calendar in view   clock in view   familiar social contact encouraged  Goal: Improved Sleep  Outcome: Progressing     Problem: Risk for Delirium  Goal: Optimal Coping  Outcome: Met  Intervention: Optimize Psychosocial Adjustment to Delirium  Recent Flowsheet Documentation  Taken 7/18/2024 0946 by Jolie Luna  Supportive Measures: positive reinforcement provided     Problem: Adult Inpatient Plan of Care  Goal: Absence of Hospital-Acquired Illness or Injury  Intervention: Identify and Manage Fall Risk  Recent Flowsheet Documentation  Taken 7/18/2024 0946 by Jolie Luna  Safety Promotion/Fall Prevention:   activity supervised   assistive device/personal items within reach   clutter free environment maintained   nonskid shoes/slippers when out of bed   room near nurse's station   room organization consistent   supervised activity  Intervention: Prevent Skin Injury  Recent Flowsheet Documentation  Taken 7/18/2024 0946 by Jolie Luna  Body Position: position changed independently  Intervention: Prevent and Manage VTE (Venous Thromboembolism) Risk  Recent Flowsheet Documentation  Taken 7/18/2024 0946 by Jolie Luna  VTE Prevention/Management: SCDs off (sequential compression devices)  Intervention: Prevent Infection  Recent Flowsheet  Documentation  Taken 7/18/2024 0946 by Jolie Luna  Infection Prevention:   hand hygiene promoted   single patient room provided     Problem: Risk for Delirium  Goal: Improved Behavioral Control  Intervention: Minimize Safety Risk  Recent Flowsheet Documentation  Taken 7/18/2024 0946 by Jolie Luna  Communication Enhancement Strategies: family/caregiver assisted with communication  Enhanced Safety Measures: room near unit station   Goal Outcome Evaluation:

## 2024-07-18 NOTE — PLAN OF CARE
Occupational Therapy Discharge Summary    Reason for therapy discharge:    Discharged to home.    Progress towards therapy goal(s). See goals on Care Plan in [facility] electronic health record for goal details.  Goals partially met.  Barriers to achieving goals:   discharge from facility.    Therapy recommendation(s):    No further therapy is recommended. Recommend continued assist from her daughter as needed, and for heavier I/ADLs to adhere to post-op precautions.

## 2024-07-19 LAB
PATH REPORT.COMMENTS IMP SPEC: NORMAL
PATH REPORT.COMMENTS IMP SPEC: NORMAL
PATH REPORT.FINAL DX SPEC: NORMAL
PATH REPORT.GROSS SPEC: NORMAL
PATH REPORT.RELEVANT HX SPEC: NORMAL
PHOTO IMAGE: NORMAL

## 2024-07-22 ENCOUNTER — ANCILLARY PROCEDURE (OUTPATIENT)
Dept: GENERAL RADIOLOGY | Facility: CLINIC | Age: 74
End: 2024-07-22
Attending: TRANSPLANT SURGERY
Payer: MEDICARE

## 2024-07-22 ENCOUNTER — APPOINTMENT (OUTPATIENT)
Dept: LAB | Facility: CLINIC | Age: 74
End: 2024-07-22
Payer: MEDICARE

## 2024-07-22 ENCOUNTER — OFFICE VISIT (OUTPATIENT)
Dept: TRANSPLANT | Facility: CLINIC | Age: 74
End: 2024-07-22
Attending: TRANSPLANT SURGERY
Payer: MEDICARE

## 2024-07-22 VITALS
RESPIRATION RATE: 16 BRPM | SYSTOLIC BLOOD PRESSURE: 136 MMHG | OXYGEN SATURATION: 98 % | WEIGHT: 131.6 LBS | DIASTOLIC BLOOD PRESSURE: 69 MMHG | BODY MASS INDEX: 22.95 KG/M2 | HEART RATE: 98 BPM | TEMPERATURE: 98.2 F

## 2024-07-22 DIAGNOSIS — K83.1 BILIARY STRICTURE (H): ICD-10-CM

## 2024-07-22 DIAGNOSIS — R14.0 BLOATING SYMPTOM: ICD-10-CM

## 2024-07-22 DIAGNOSIS — R14.0 BLOATING SYMPTOM: Primary | ICD-10-CM

## 2024-07-22 PROBLEM — E11.9 DIABETES MELLITUS, TYPE 2 (H): Status: ACTIVE | Noted: 2024-07-22

## 2024-07-22 LAB
ALBUMIN SERPL BCG-MCNC: 3.3 G/DL (ref 3.5–5.2)
ALP SERPL-CCNC: 241 U/L (ref 40–150)
ALT SERPL W P-5'-P-CCNC: 37 U/L (ref 0–50)
ANION GAP SERPL CALCULATED.3IONS-SCNC: 10 MMOL/L (ref 7–15)
AST SERPL W P-5'-P-CCNC: 27 U/L (ref 0–45)
BILIRUB SERPL-MCNC: 0.3 MG/DL
BUN SERPL-MCNC: 10.6 MG/DL (ref 8–23)
CALCIUM SERPL-MCNC: 9.2 MG/DL (ref 8.8–10.4)
CHLORIDE SERPL-SCNC: 101 MMOL/L (ref 98–107)
CREAT SERPL-MCNC: 0.56 MG/DL (ref 0.51–0.95)
EGFRCR SERPLBLD CKD-EPI 2021: >90 ML/MIN/1.73M2
GLUCOSE SERPL-MCNC: 101 MG/DL (ref 70–99)
HCO3 SERPL-SCNC: 20 MMOL/L (ref 22–29)
POTASSIUM SERPL-SCNC: 4 MMOL/L (ref 3.4–5.3)
PROT SERPL-MCNC: 6.4 G/DL (ref 6.4–8.3)
SODIUM SERPL-SCNC: 131 MMOL/L (ref 135–145)

## 2024-07-22 PROCEDURE — 74018 RADEX ABDOMEN 1 VIEW: CPT | Performed by: RADIOLOGY

## 2024-07-22 PROCEDURE — 36415 COLL VENOUS BLD VENIPUNCTURE: CPT | Performed by: TRANSPLANT SURGERY

## 2024-07-22 PROCEDURE — 99024 POSTOP FOLLOW-UP VISIT: CPT | Performed by: TRANSPLANT SURGERY

## 2024-07-22 PROCEDURE — 80053 COMPREHEN METABOLIC PANEL: CPT | Performed by: TRANSPLANT SURGERY

## 2024-07-22 PROCEDURE — G0463 HOSPITAL OUTPT CLINIC VISIT: HCPCS | Performed by: TRANSPLANT SURGERY

## 2024-07-22 NOTE — TELEPHONE ENCOUNTER
Writer has spoken with Kimberlyn regarding planned procedure with IR via telephone.      Kimberlyn acknowledges understanding of pre-procedure instructions.         I have provided Kimberlyn with IR number ([phone]) for questions or concerns.    Sofiya LAM  Interventional Radiology RN   [phone]

## 2024-07-22 NOTE — PROGRESS NOTES
"[facility], [place]   Transplant Surgery Daily Note          Assessment and Plan:       Post melinda en y hepaticojejunostomy, doing well, plan: tube check and if ok discharge tube  Feeling bloated, check a KUB          Interval History:         ROS: 10 point ROS neg other than the symptoms noted above         Physical Exam:   Vital signs:  Temp: 98.2  F (36.8  C) Temp src: Oral BP: 136/69 Pulse: 98   Resp: 16 SpO2: 98 %       Weight: 59.7 kg (131 lb 9.6 oz)  Estimated body mass index is 22.95 kg/m  as calculated from the following:    Height as of 7/10/24: 1.613 m (5' 3.5\").    Weight as of this encounter: 59.7 kg (131 lb 9.6 oz).         Constitutional:   HEENT: PERRL, no icterus  Hematologic / Lymphatic:  Lungs: Clear to auscultation bilaterally  Cardiovascular: RRR, s1, s2  Abdomen: scar healthy remove staples next week at home  Genitourinary:  Musculoskeletal:   Neurologic: aox3  Skin: warm, well perfused, no rashes noted  Lines:       .       "

## 2024-07-22 NOTE — NURSING NOTE
Chief Complaint   Patient presents with    Follow Up       /69 (BP Location: Right arm, Patient Position: Sitting, Cuff Size: Adult Regular)   Pulse 98   Temp 98.2  F (36.8  C) (Oral)   Resp 16   Wt 59.7 kg (131 lb 9.6 oz)   SpO2 98%   BMI 22.95 kg/m      GWEN MERAZ RN on 7/22/2024 at 11:27 AM

## 2024-07-23 ENCOUNTER — VIRTUAL VISIT (OUTPATIENT)
Dept: INFECTIOUS DISEASES | Facility: CLINIC | Age: 74
End: 2024-07-23
Payer: MEDICARE

## 2024-07-23 DIAGNOSIS — R78.81 BACTEREMIA: Primary | ICD-10-CM

## 2024-07-23 DIAGNOSIS — K83.1 BILIARY STRICTURE (H): ICD-10-CM

## 2024-07-23 PROCEDURE — 99203 OFFICE O/P NEW LOW 30 MIN: CPT | Mod: 95 | Performed by: INTERNAL MEDICINE

## 2024-07-23 NOTE — PROGRESS NOTES
Kimberlyn is a 73 year old who is being evaluated via a billable video visit.    How would you like to obtain your AVS? MyChart  If the video visit is dropped, the invitation should be resent by: Text to cell phone: [phone]  Will anyone else be joining your video visit? No    Video-Visit Details  Type of service:  Video Visit   Video End Time: 4.31 PM-4.48 PM  Originating Location (pt. Location): Home  Distant Location (provider location):  Off-site  Platform used for Video Visit: [facility]    INFECTIOUS DISEASES PROGRESS NOTE    Infectious Diseases Clinic     SUBJECTIVE:                                                      Rocío Russell is a 73 year old female with a past medical history of HTN, HLD, h/o DVT, anemia, tobacco use, GERD, type 2 diabetes, and h/o symptomatic cholelithiasis s/p laparoscopic cholecystectomy (12/2023) complicated by common bile duct stricture s/p percutaneous transhepatic biliary drain placement, G-tube placement and MILLI drain placement (exchanged on 5/10/2024). Patient was admitted on 7/10/2024 and underwent planned Tamiko-en-Y hepaticojejunostomy and Lysis of adhesions. Intra op noted purulent material in the bile ducts and specimen was sent for gram stain and culture. fluid culture grew 4+ Citrobacter koseri, 4+ Klebsiella oxytoca 4+ Enterococcus gallinarum and 4+ Enterococcus faecalis. She was on Pip/tazobactam, Vancomycin IV and fluconazole on 7/10.      Later that day, patient had a fever of 100.4F and WBC increased to 20.3 from 11.5 on 7/9/24. Blood cultures were obtained and later came back positive for Citrobacter koseri and Enterococcus faecalis . Pip/tazobactam was discontinued and  switched to Imipenem- cilastatin on 7/11.    Vanco IV was discontinued on 7/11 and Fluconazole was discontinued on 7/13/24. Zosyn was restarted on 7/15 and discharged on 7/18/24 on cipro.    She had some nausea 2 days ago, but no vomiting. feels bloated, with soft stools. no fever. eats less due to  GI symptoms. will have another tube removed tomorrow and leaves for home tomorrow.     KUB on 2/22/24 showed  Nonobstructive bowel gas pattern. Thickened mucosal folds involving loops of small bowel in the left mid abdomen, which can be seen with enteritis or sequela of prior dilation seen on CT images from 7/16/2024.    Problem list and histories reviewed & adjusted, as indicated.  Additional history: as documented    PAST MEDICAL HISTORY:  Patient  has a past medical history of Closed fracture of one or more phalanges of foot, Diabetes mellitus (H), Foot drop, Gastroesophageal reflux disease with esophagitis, HLD (hyperlipidemia), HTN (hypertension), Injury of bile duct, Osteopenia, Personal history of DVT (deep vein thrombosis), Sciatica, unspecified laterality, and Tobacco use.    PAST SURGICAL HISTORY:  Patient  has a past surgical history that includes IR ERCP (12/18/2023); Cholecystectomy; Lumbar Spine Surgery; Breast Lumpectomy W/ Needle Localization; Right Oophorectomy; carpal tunnel release rt/lt (Bilateral); EXPLORATORY LAPAROTOMY, EVACUATION OF TWO LITERS OF FLUID CONTAINING BLOOD AND BILE, PERITONEAL LAVAGE, DRAINAGE OF RIGHT KIDNEY CYST, PLACEMENT OF DRAIN, INSERTION OF GASTROSTOMY FEEDING TUB; and Hepaticojejunostomy (N/A, 7/10/2024).    CURRENT MEDICATIONS:  Current Outpatient Medications   Medication Sig Dispense Refill    ACCU-CHEK GUIDE test strip TEST BLOOD SUGAR THREE TIMES DAILY      acetaminophen (TYLENOL) 325 MG tablet Take 2 tablets (650 mg) by mouth every 6 hours as needed for pain      amitriptyline (ELAVIL) 10 MG tablet Take 10 mg by mouth at bedtime      aspirin 81 MG EC tablet Take 81 mg by mouth daily      blood glucose monitoring (SOFTCLIX) lancets       calcium 600 MG tablet Take 1 tablet (600 mg) by mouth daily (with lunch)      calcium carbonate (TUMS) 500 MG chewable tablet Take 1 chew tab by mouth 3 times daily as needed      ciprofloxacin (CIPRO) 500 MG tablet Take 1 tablet (500  mg) by mouth every 12 hours for 7 days 14 tablet 0    COLLAGEN PO Take by mouth 2 times daily      Continuous Blood Gluc Sensor (FREESTYLE KATHERINE 2 SENSOR) [facility] [place] APPLY ONE NEW SENSOR TO THE BACK OF THE UPPER ARM ONCE EVERY 14 DAYS AS DIRECTED, TO MONITOR BLOOD SUGAR CONTINUOUSLY.      DULOXETINE HCL PO Take 60 mg by mouth every morning      fenofibrate (TRIGLIDE/LOFIBRA) 160 MG tablet Take 160 mg by mouth every morning      ferrous sulfate (FEROSUL) 325 (65 Fe) MG tablet Take 1 tablet (325 mg) by mouth daily (with lunch)      furosemide (LASIX) 20 MG tablet Take 20 mg by mouth daily as needed      gabapentin (NEURONTIN) 600 MG tablet Take 600 mg by mouth 2 times daily      glimepiride (AMARYL) 1 MG tablet Take 1 mg by mouth every morning (before breakfast)      Glucosamine Sulfate 500 MG TABS Take 1,000 mg by mouth every morning      ibuprofen (ADVIL/MOTRIN) 200 MG tablet Take 200-400 mg by mouth every 6 hours as needed for pain      lisinopril (ZESTRIL) 40 MG tablet Take 40 mg by mouth every morning      loratadine (CLARITIN) 10 MG tablet Take 10 mg by mouth every morning      magnesium gluconate (MAGONATE) 500 MG tablet Take 0.5 tablets (250 mg) by mouth daily (with lunch)      metFORMIN (GLUCOPHAGE XR) 500 MG 24 hr tablet Take 1,000 mg by mouth 2 times daily (with meals)      Multiple Vitamins-Minerals (PRESERVISION/LUTEIN) CAPS Take 1 capsule by mouth daily (with lunch)      polyethylene glycol-propylene glycol (SYSTANE ULTRA) 0.4-0.3 % SOLN ophthalmic solution Place 1 drop into both eyes 2 times daily as needed for dry eyes      senna-docusate (SENOKOT-S/PERICOLACE) 8.6-50 MG tablet Take 1 tablet by mouth 3 times daily (with meals)      simvastatin (ZOCOR) 40 MG tablet Take 40 mg by mouth at bedtime      Vitamin D, Cholecalciferol, 10 MCG (400 UNIT) TABS Take 400 Units by mouth every other day       ALLERGY:  Allergies   Allergen Reactions    Animal Dander Other (See Comments)     Runny nose/watery eyes    Nickel  Rash    Perfume Headache    Codeine Other (See Comments) and Unknown     Other reaction(s): Nightmares   Other reaction(s): Nightmares    Other reaction(s): Nightmares    Sulfa Antibiotics      Eye drops     IMMUNIZATION HISTORY:  Immunization History   Administered Date(s) Administered    COVID-19 Bivalent 18+ (Moderna) 10/31/2022    COVID-19 Monovalent 18+ (Moderna) 01/07/2021, 11/02/2021, 05/12/2022     SOCIAL HISTORY:  Patient  reports that she has been smoking cigarettes. She has been exposed to tobacco smoke. She has never used smokeless tobacco. She reports that she does not currently use alcohol. She reports that she does not currently use drugs.    FAMILY HISTORY:  Patient's family history is not on file.    Labs reviewed in EPIC    OBJECTIVE:                                                    There were no vitals taken for this visit. There is no height or weight on file to calculate BMI.   GENERAL: alert and no distress  EYES: Eyes grossly normal to inspection  NECK: supple  RESP: breathing comfortably on room air  SKIN: no suspicious lesions or rashes  NEURO: mentation intact and speech normal  PSYCH: mentation appears normal, affect normal       ASSESSMENT AND PLAN:                                                      ASSESSMENT:  Bacteremia (Citrobacter koseri, Enetroccous gallinarum and Enterococcus faecalis) - 7/10/24  - blood cx - neg on 7/12 and 7/13 so far   Purulence in common bile duct - 1/0/24  H/o symptomatic cholelithiasis s/p laparoscopic cholecystectomy (12/2023) complicated by common bile duct stricture s/p percutaneous transhepatic biliary drain placement, G-tube placement and MILLI drain placement (exchanged on 5/10/2024).  -     Tamiko-en-Y hepaticojejunostomy and Lysis of adhesions - 7/10/24   Leukocytosis - improving      RECOMMENDATION:  - continue cipro till 7/24/24   - monitor for worsening GI symptoms. if diarrhea, check stools for C.diff.   - if febrile, go to ER for further  evaluation     patient/daughter will notify us, if symptoms worsen    Luis Pennington MD, M.Med.Sc  Division of Infectious Diseases and International Medicine  [facility]      30 Minutes spent by me on the date of service doing chart review, history, exam, documentation, coordination of care and further activities per the note

## 2024-07-24 ENCOUNTER — APPOINTMENT (OUTPATIENT)
Dept: MEDSURG UNIT | Facility: CLINIC | Age: 74
End: 2024-07-24
Attending: INTERNAL MEDICINE
Payer: MEDICARE

## 2024-07-24 ENCOUNTER — HOSPITAL ENCOUNTER (OUTPATIENT)
Facility: CLINIC | Age: 74
Discharge: HOME | End: 2024-07-24
Attending: INTERNAL MEDICINE | Admitting: RADIOLOGY
Payer: MEDICARE

## 2024-07-24 ENCOUNTER — APPOINTMENT (OUTPATIENT)
Dept: INTERVENTIONAL RADIOLOGY/VASCULAR | Facility: CLINIC | Age: 74
End: 2024-07-24
Attending: NURSE PRACTITIONER
Payer: MEDICARE

## 2024-07-24 VITALS
WEIGHT: 127.87 LBS | HEIGHT: 64 IN | RESPIRATION RATE: 16 BRPM | DIASTOLIC BLOOD PRESSURE: 76 MMHG | SYSTOLIC BLOOD PRESSURE: 158 MMHG | HEART RATE: 93 BPM | OXYGEN SATURATION: 100 % | BODY MASS INDEX: 21.83 KG/M2 | TEMPERATURE: 98.7 F

## 2024-07-24 DIAGNOSIS — S36.13XA BILE DUCT INJURY: ICD-10-CM

## 2024-07-24 DIAGNOSIS — K83.1 BILIARY STRICTURE (H): ICD-10-CM

## 2024-07-24 LAB
ALBUMIN SERPL BCG-MCNC: 3.3 G/DL (ref 3.5–5.2)
ALP SERPL-CCNC: 192 U/L (ref 40–150)
ALT SERPL W P-5'-P-CCNC: 29 U/L (ref 0–50)
AST SERPL W P-5'-P-CCNC: 26 U/L (ref 0–45)
BILIRUB DIRECT SERPL-MCNC: <0.2 MG/DL (ref 0–0.3)
BILIRUB SERPL-MCNC: 0.2 MG/DL
GLUCOSE BLDC GLUCOMTR-MCNC: 115 MG/DL (ref 70–99)
GLUCOSE BLDC GLUCOMTR-MCNC: 138 MG/DL (ref 70–99)
PROT SERPL-MCNC: 6.2 G/DL (ref 6.4–8.3)

## 2024-07-24 PROCEDURE — 255N000002 HC RX 255 OP 636: Performed by: RADIOLOGY

## 2024-07-24 PROCEDURE — C1769 GUIDE WIRE: HCPCS

## 2024-07-24 PROCEDURE — 36415 COLL VENOUS BLD VENIPUNCTURE: CPT | Performed by: PHYSICIAN ASSISTANT

## 2024-07-24 PROCEDURE — 999N000132 HC STATISTIC PP CARE STAGE 1

## 2024-07-24 PROCEDURE — 250N000011 HC RX IP 250 OP 636: Performed by: PHYSICIAN ASSISTANT

## 2024-07-24 PROCEDURE — 258N000003 HC RX IP 258 OP 636: Performed by: PHYSICIAN ASSISTANT

## 2024-07-24 PROCEDURE — 82962 GLUCOSE BLOOD TEST: CPT

## 2024-07-24 PROCEDURE — 47531 INJECTION FOR CHOLANGIOGRAM: CPT | Mod: GC | Performed by: RADIOLOGY

## 2024-07-24 PROCEDURE — 47531 INJECTION FOR CHOLANGIOGRAM: CPT

## 2024-07-24 PROCEDURE — 80076 HEPATIC FUNCTION PANEL: CPT | Performed by: PHYSICIAN ASSISTANT

## 2024-07-24 PROCEDURE — 999N000142 HC STATISTIC PROCEDURE PREP ONLY

## 2024-07-24 RX ORDER — IOPAMIDOL 755 MG/ML
100 INJECTION, SOLUTION INTRAVASCULAR ONCE
Status: DISCONTINUED | OUTPATIENT
Start: 2024-07-24 | End: 2024-07-24 | Stop reason: HOSPADM

## 2024-07-24 RX ORDER — URSODIOL 300 MG/1
300 CAPSULE ORAL 2 TIMES DAILY
Qty: 90 CAPSULE | Refills: 2 | Status: SHIPPED | OUTPATIENT
Start: 2024-07-24

## 2024-07-24 RX ORDER — AMPICILLIN AND SULBACTAM 2; 1 G/1; G/1
3 INJECTION, POWDER, FOR SOLUTION INTRAMUSCULAR; INTRAVENOUS
Status: COMPLETED | OUTPATIENT
Start: 2024-07-24 | End: 2024-07-24

## 2024-07-24 RX ORDER — LIDOCAINE 40 MG/G
CREAM TOPICAL
Status: DISCONTINUED | OUTPATIENT
Start: 2024-07-24 | End: 2024-07-24 | Stop reason: HOSPADM

## 2024-07-24 RX ORDER — SODIUM CHLORIDE 9 MG/ML
INJECTION, SOLUTION INTRAVENOUS CONTINUOUS
Status: DISCONTINUED | OUTPATIENT
Start: 2024-07-24 | End: 2024-07-24 | Stop reason: HOSPADM

## 2024-07-24 RX ORDER — IOPAMIDOL 510 MG/ML
100 INJECTION, SOLUTION INTRAVASCULAR ONCE
Status: COMPLETED | OUTPATIENT
Start: 2024-07-24 | End: 2024-07-24

## 2024-07-24 RX ADMIN — IOPAMIDOL 30 ML: 510 INJECTION, SOLUTION INTRAVASCULAR at 08:45

## 2024-07-24 RX ADMIN — SODIUM CHLORIDE: 9 INJECTION, SOLUTION INTRAVENOUS at 07:06

## 2024-07-24 RX ADMIN — AMPICILLIN SODIUM AND SULBACTAM SODIUM 3 G: 2; 1 INJECTION, POWDER, FOR SOLUTION INTRAMUSCULAR; INTRAVENOUS at 07:05

## 2024-07-24 ASSESSMENT — ACTIVITIES OF DAILY LIVING (ADL)
ADLS_ACUITY_SCORE: 38

## 2024-07-24 NOTE — IR NOTE
Patient Name: Rocío Russell  Medical Record Number: [id number]  Today's Date: 7/24/2024    Procedure: Image guided biliary tube check with removal  Proceduralist: Dr. Whitmore, Dr. Patrick  Pathology present: n/a    Procedure Start: 0828  Procedure end: 0841  Sedation medications administered: n/a     Report given to:  2A RN  : n/a    Other Notes: Pt arrived to IR room 1 from . Consent reviewed. Pt denies any questions or concerns regarding procedure. Pt positioned supine and monitored per protocol.     Pt tolerated procedure without any noted complications. Tube removed. Dressing placed over.     Pt transferred back to .

## 2024-07-24 NOTE — PRE-PROCEDURE
GENERAL PRE-PROCEDURE:   Procedure:  IR biliary tube check with possible removal  Date/Time:  7/24/2024 7:21 AM    Verbal consent obtained?: Yes    Written consent obtained?: Yes    Risks and benefits: Risks, benefits and alternatives were discussed    Consent given by:  Patient  Patient states understanding of procedure being performed: Yes    Patient's understanding of procedure matches consent: Yes    Procedure consent matches procedure scheduled: Yes    ASA Class:  2  Mallampati  :  Grade 2- soft palate, base of uvula, tonsillar pillars, and portion of posterior pharyngeal wall visible  History & Physical reviewed:  History and physical reviewed and no updates needed  Statement of review:  I have reviewed the lab findings, diagnostic data, medications, and the plan for sedation

## 2024-07-24 NOTE — PROCEDURES
[facility]    Procedure: Cholangiogram with biliary tube removal    Date/Time: 7/24/2024 8:47 AM    Performed by: Giuseppe Patrick MD  Authorized by: Giuseppe Patrick MD  IR Fellow Physician: Gustavo Whitmore      UNIVERSAL PROTOCOL   Site Marked: NA  Prior Images Obtained and Reviewed:  Yes  Required items: Required blood products, implants, devices and special equipment available    Patient identity confirmed:  Verbally with patient, arm band, provided demographic data and hospital-assigned identification number  NA - No sedation, light sedation, or local anesthesia  Confirmation Checklist:  Patient's identity using two indicators, relevant allergies, procedure was appropriate and matched the consent or emergent situation and correct equipment/implants were available  Time out: Immediately prior to the procedure a time out was called    Universal Protocol: the Joint Commission Universal Protocol was followed    Preparation: Patient was prepped and draped in usual sterile fashion    ESBL (mL):  0     ANESTHESIA    Anesthesia:  Local infiltration  Local Anesthetic:  Lidocaine 1% without epinephrine  Anesthetic Total (mL):  0      SEDATION    Patient Sedated: No    Fluoroscopy Time: 1 minute(s)  See dictated procedure note for full details.  Findings: none    Specimens: none    Complications: None    Condition: Stable    Plan: Patient may discharge. Follow up with surgery      PROCEDURE  Describe Procedure: Cholangiogram demonstrated patent CBD draining into the jejunum. Drain was removed after discussion with Dr. Becker.   Patient Tolerance:  Patient tolerated the procedure well with no immediate complications  Length of time physician/provider present for 1:1 monitoring during sedation: 0

## 2024-07-24 NOTE — DISCHARGE INSTRUCTIONS
[facility]  Department of Vascular and Interventional Radiology (IR)   Biliary Tube Removal Discharge Instructions    Contact Information  Please contact IR for the following:    -Fever ?101F and/or shaking chills  -worsening redness, warmth and pus drainage at/or around the site of catheter placement  -Significant leakage around the catheter site  -New or increasing pain that does not respond to over the counter pain medication  -Keep dressing in place for 24 hours.     The Interventional Radiology Nursing line is available at [phone] Monday to Friday (7:30AM-4:00PM).   Any voicemails left will be returned within 24 hours.      For emergencies (that cannot wait until normal business hours) during night time hours and weekends,  please call [employer] [facility]  at [phone] and ask to page [facility] on call Interventional Radiology team.

## 2024-07-24 NOTE — PROGRESS NOTES
Pt tolerated post biliary tube removal recovery well. Pt alert and oriented. VSS. Sats >92% on RA. Pt denies any pain. Right mid abdomen site WNL. Primapore dressing in place. No bleeding or hematoma. Pt tolerated oral intake and liquids. Ambulated. Voided. Discharge instructions reviewed with pt. Pt verbalized understanding. Copy given to patient. Wheelchair ride out to [employer]. Pt's daughter Precious transported patient home.

## 2024-07-24 NOTE — PROGRESS NOTES
Pt prepped for biliary tube check w/possible removal. Pt alert and oriented. VSS. Sats >92% on RA. Pt denies any pain. Biliary tube on right abdomen in place, site WNL. . NaCl infusing @ 75 ml/hr. Labs in process. Consent signed. Pt's daughter Precious at bedside and will transport pt home post procedure.

## 2024-07-24 NOTE — PROGRESS NOTES
Pt returned from biliary tube removal. Pt alert and oriented. VSS. Sats >92% on RA. Pt denies any pain. Right mid abdomen site WNL. Primapore dressing in place. No bleeding or drainage noted. Pt's daughter Precious at bedside.

## 2024-08-07 LAB — BACTERIA FLD CULT: NO GROWTH

## 2024-08-19 RX ORDER — URSODIOL 300 MG/1
300 CAPSULE ORAL 2 TIMES DAILY
Qty: 120 CAPSULE | Refills: 3 | Status: SHIPPED | OUTPATIENT
Start: 2024-08-19

## 2024-10-14 ENCOUNTER — MYC REFILL (OUTPATIENT)
Dept: TRANSPLANT | Facility: CLINIC | Age: 74
End: 2024-10-14
Payer: MEDICARE

## 2024-10-14 DIAGNOSIS — K83.1 BILIARY STRICTURE (H): ICD-10-CM

## 2024-10-14 DIAGNOSIS — R14.0 BLOATING SYMPTOM: ICD-10-CM

## 2024-10-14 RX ORDER — URSODIOL 300 MG/1
300 CAPSULE ORAL 2 TIMES DAILY
Qty: 120 CAPSULE | Refills: 3 | OUTPATIENT
Start: 2024-10-14

## 2024-12-15 ENCOUNTER — HEALTH MAINTENANCE LETTER (OUTPATIENT)
Age: 74
End: 2024-12-15

## 2025-03-04 DIAGNOSIS — S36.13XA INJURY OF BILE DUCT, INITIAL ENCOUNTER: Primary | ICD-10-CM

## 2025-03-16 ENCOUNTER — HEALTH MAINTENANCE LETTER (OUTPATIENT)
Age: 75
End: 2025-03-16

## 2025-03-24 ENCOUNTER — ANCILLARY PROCEDURE (OUTPATIENT)
Dept: GENERAL RADIOLOGY | Facility: CLINIC | Age: 75
End: 2025-03-24
Attending: TRANSPLANT SURGERY
Payer: MEDICARE

## 2025-03-24 ENCOUNTER — ANCILLARY PROCEDURE (OUTPATIENT)
Dept: ULTRASOUND IMAGING | Facility: CLINIC | Age: 75
End: 2025-03-24
Attending: TRANSPLANT SURGERY
Payer: MEDICARE

## 2025-03-24 ENCOUNTER — OFFICE VISIT (OUTPATIENT)
Dept: TRANSPLANT | Facility: CLINIC | Age: 75
End: 2025-03-24
Attending: TRANSPLANT SURGERY
Payer: MEDICARE

## 2025-03-24 ENCOUNTER — LAB (OUTPATIENT)
Dept: LAB | Facility: CLINIC | Age: 75
End: 2025-03-24
Payer: MEDICARE

## 2025-03-24 DIAGNOSIS — S36.13XA INJURY OF BILE DUCT, INITIAL ENCOUNTER: ICD-10-CM

## 2025-03-24 DIAGNOSIS — K83.09 CHOLANGITIS (H): ICD-10-CM

## 2025-03-24 DIAGNOSIS — K83.09 CHOLANGITIS (H): Primary | ICD-10-CM

## 2025-03-24 LAB
ALBUMIN SERPL BCG-MCNC: 3.6 G/DL (ref 3.5–5.2)
ALBUMIN UR-MCNC: 20 MG/DL
ALP SERPL-CCNC: 112 U/L (ref 40–150)
ALT SERPL W P-5'-P-CCNC: 53 U/L (ref 0–50)
ALT SERPL W P-5'-P-CCNC: 55 U/L (ref 0–50)
ANION GAP SERPL CALCULATED.3IONS-SCNC: 10 MMOL/L (ref 7–15)
ANION GAP SERPL CALCULATED.3IONS-SCNC: 8 MMOL/L (ref 7–15)
APPEARANCE UR: CLEAR
AST SERPL W P-5'-P-CCNC: 89 U/L (ref 0–45)
BACTERIA #/AREA URNS HPF: ABNORMAL /HPF
BILIRUB SERPL-MCNC: 0.3 MG/DL
BILIRUB UR QL STRIP: NEGATIVE
BUN SERPL-MCNC: 26.7 MG/DL (ref 8–23)
BUN SERPL-MCNC: 27.1 MG/DL (ref 8–23)
CALCIUM SERPL-MCNC: 10.1 MG/DL (ref 8.8–10.4)
CALCIUM SERPL-MCNC: 10.4 MG/DL (ref 8.8–10.4)
CHLORIDE SERPL-SCNC: 105 MMOL/L (ref 98–107)
CHLORIDE SERPL-SCNC: 106 MMOL/L (ref 98–107)
COLOR UR AUTO: YELLOW
CREAT SERPL-MCNC: 0.67 MG/DL (ref 0.51–0.95)
CREAT SERPL-MCNC: 0.71 MG/DL (ref 0.51–0.95)
EGFRCR SERPLBLD CKD-EPI 2021: 89 ML/MIN/1.73M2
EGFRCR SERPLBLD CKD-EPI 2021: >90 ML/MIN/1.73M2
ERYTHROCYTE [DISTWIDTH] IN BLOOD BY AUTOMATED COUNT: 17.2 % (ref 10–15)
GLUCOSE SERPL-MCNC: 165 MG/DL (ref 70–99)
GLUCOSE SERPL-MCNC: 189 MG/DL (ref 70–99)
GLUCOSE UR STRIP-MCNC: NEGATIVE MG/DL
HCO3 SERPL-SCNC: 23 MMOL/L (ref 22–29)
HCO3 SERPL-SCNC: 25 MMOL/L (ref 22–29)
HCT VFR BLD AUTO: 32.5 % (ref 35–47)
HGB BLD-MCNC: 10.7 G/DL (ref 11.7–15.7)
HGB UR QL STRIP: NEGATIVE
KETONES UR STRIP-MCNC: NEGATIVE MG/DL
LEUKOCYTE ESTERASE UR QL STRIP: ABNORMAL
MCH RBC QN AUTO: 28.1 PG (ref 26.5–33)
MCHC RBC AUTO-ENTMCNC: 32.9 G/DL (ref 31.5–36.5)
MCV RBC AUTO: 85 FL (ref 78–100)
NITRATE UR QL: NEGATIVE
PH UR STRIP: 6.5 [PH] (ref 5–7)
PLATELET # BLD AUTO: 330 10E3/UL (ref 150–450)
POTASSIUM SERPL-SCNC: 4.2 MMOL/L (ref 3.4–5.3)
POTASSIUM SERPL-SCNC: 4.3 MMOL/L (ref 3.4–5.3)
PROT SERPL-MCNC: 6.9 G/DL (ref 6.4–8.3)
RBC # BLD AUTO: 3.81 10E6/UL (ref 3.8–5.2)
RBC URINE: <1 /HPF
SODIUM SERPL-SCNC: 138 MMOL/L (ref 135–145)
SODIUM SERPL-SCNC: 139 MMOL/L (ref 135–145)
SP GR UR STRIP: 1.03 (ref 1–1.03)
SQUAMOUS EPITHELIAL: 1 /HPF
UROBILINOGEN UR STRIP-MCNC: NORMAL MG/DL
WBC # BLD AUTO: 13.2 10E3/UL (ref 4–11)
WBC URINE: <1 /HPF

## 2025-03-24 PROCEDURE — 76705 ECHO EXAM OF ABDOMEN: CPT | Mod: GC | Performed by: STUDENT IN AN ORGANIZED HEALTH CARE EDUCATION/TRAINING PROGRAM

## 2025-03-24 PROCEDURE — 81001 URINALYSIS AUTO W/SCOPE: CPT | Performed by: TRANSPLANT SURGERY

## 2025-03-24 PROCEDURE — G0463 HOSPITAL OUTPT CLINIC VISIT: HCPCS | Performed by: TRANSPLANT SURGERY

## 2025-03-24 PROCEDURE — 99000 SPECIMEN HANDLING OFFICE-LAB: CPT | Performed by: PATHOLOGY

## 2025-03-24 PROCEDURE — 87040 BLOOD CULTURE FOR BACTERIA: CPT | Performed by: TRANSPLANT SURGERY

## 2025-03-24 PROCEDURE — 74018 RADEX ABDOMEN 1 VIEW: CPT | Performed by: RADIOLOGY

## 2025-03-24 PROCEDURE — 85027 COMPLETE CBC AUTOMATED: CPT | Performed by: PATHOLOGY

## 2025-03-24 PROCEDURE — 71046 X-RAY EXAM CHEST 2 VIEWS: CPT | Mod: GC | Performed by: RADIOLOGY

## 2025-03-24 PROCEDURE — 80053 COMPREHEN METABOLIC PANEL: CPT | Performed by: PATHOLOGY

## 2025-03-24 PROCEDURE — 36415 COLL VENOUS BLD VENIPUNCTURE: CPT | Performed by: PATHOLOGY

## 2025-03-24 RX ORDER — METRONIDAZOLE 500 MG/1
500 TABLET ORAL 3 TIMES DAILY
Qty: 21 TABLET | Refills: 0 | Status: SHIPPED | OUTPATIENT
Start: 2025-03-24 | End: 2025-03-31

## 2025-03-24 RX ORDER — CIPROFLOXACIN 500 MG/1
500 TABLET, FILM COATED ORAL 2 TIMES DAILY
Qty: 14 TABLET | Refills: 0 | Status: SHIPPED | OUTPATIENT
Start: 2025-03-24 | End: 2025-03-31

## 2025-03-24 RX ORDER — MAGNESIUM CARB/ALUMINUM HYDROX 105-160MG
296 TABLET,CHEWABLE ORAL ONCE
Qty: 296 ML | Refills: 0 | Status: SHIPPED | OUTPATIENT
Start: 2025-03-24 | End: 2025-03-24

## 2025-03-24 RX ORDER — POLYETHYLENE GLYCOL 3350 17 G/17G
1 POWDER, FOR SOLUTION ORAL
COMMUNITY

## 2025-03-24 RX ORDER — POLYETHYLENE GLYCOL 3350 17 G/17G
17 POWDER, FOR SOLUTION ORAL DAILY
Qty: 510 G | Refills: 3 | Status: SHIPPED | OUTPATIENT
Start: 2025-03-24 | End: 2025-07-22

## 2025-03-24 NOTE — LETTER
3/24/2025      Rocío Russell  [address] [address]      Dear Colleague,    Thank you for referring your patient, Rocío Russell, to the [facility]. Please see a copy of my visit note below.    [facility], [place]   Transplant Surgery           Assessment and Plan:     Fevers last week            Interval History:     Patient had a Tamiko-en-Y anastomosis for a postcholecystectomy benign biliary stricture.  She developed fevers last week.  The fevers were around 101.  No jaundice.  Her deconditioning has somewhat improved and she is walking around.  She also has been having some constipation.    ROS: 10 point ROS neg other than the symptoms noted above         Physical Exam:     Constitutional:   HEENT: PERRL, no icterus  Hematologic / Lymphatic:  Lungs: Clear to auscultation bilaterally  Cardiovascular: RRR, s1, s2  Abdomen:   Genitourinary:  fused, no rashes noted  Lines:    Review of laboratory values: The alkaline phosphatase is normal, mild increase in AST and ALT.  Urine shows some bacteria and mildly positive leukoesterase.  KUB shows some nonobstructive bowel gas pattern.  Ultrasound shows mild dilatation of the bile ducts    Recommendations: Ciprofloxacin and Flagyl for 1 week.  Report back if she has any further fevers and then we will interrogate the bile ducts       .           Again, thank you for allowing me to participate in the care of your patient.        Sincerely,        Alejandro Becker MD    Electronically signed

## 2025-03-25 NOTE — PROGRESS NOTES
[facility], [place]   Transplant Surgery           Assessment and Plan:     Fevers last week            Interval History:     Patient had a Tamiko-en-Y anastomosis for a postcholecystectomy benign biliary stricture.  She developed fevers last week.  The fevers were around 101.  No jaundice.  Her deconditioning has somewhat improved and she is walking around.  She also has been having some constipation.    ROS: 10 point ROS neg other than the symptoms noted above         Physical Exam:     Constitutional:   HEENT: PERRL, no icterus  Hematologic / Lymphatic:  Lungs: Clear to auscultation bilaterally  Cardiovascular: RRR, s1, s2  Abdomen:   Genitourinary:  fused, no rashes noted  Lines:    Review of laboratory values: The alkaline phosphatase is normal, mild increase in AST and ALT.  Urine shows some bacteria and mildly positive leukoesterase.  KUB shows some nonobstructive bowel gas pattern.  Ultrasound shows mild dilatation of the bile ducts    Recommendations: Ciprofloxacin and Flagyl for 1 week.  Report back if she has any further fevers and then we will interrogate the bile ducts       .

## 2025-03-27 LAB — BACTERIA BLD CULT: NORMAL

## 2025-03-29 LAB — BACTERIA BLD CULT: NO GROWTH

## 2025-06-29 ENCOUNTER — HEALTH MAINTENANCE LETTER (OUTPATIENT)
Age: 75
End: 2025-06-29

## 2025-07-20 ENCOUNTER — HEALTH MAINTENANCE LETTER (OUTPATIENT)
Age: 75
End: 2025-07-20

## (undated) DEVICE — SOL WATER IRRIG 1000ML BOTTLE 2F7114

## (undated) DEVICE — STPL SKIN 35W ROTATING HEAD PRW35

## (undated) DEVICE — ESU ELEC BLADE 2.75" COATED/INSULATED E1455

## (undated) DEVICE — CLIP APPLIER 13" LG LIGACLIP MCL20

## (undated) DEVICE — SPONGE LAP 18X18" X8435

## (undated) DEVICE — LINEN TOWEL PACK X30 5481

## (undated) DEVICE — DRAIN JACKSON PRATT CHANNEL 19FR ROUND HUBLESS SIL JP-2230

## (undated) DEVICE — CATH TRAY FOLEY 16FR BARDEX W/TEMP PRB URINE METER 319416AM

## (undated) DEVICE — CLIP APPLIER 11" MED LIGACLIP MCM30

## (undated) DEVICE — SU PDS II 0 TP-1 60" Z991G

## (undated) DEVICE — SU PDS II 4-0 SH 27" Z315H

## (undated) DEVICE — SU SILK 1 TIE 6X30" A307H

## (undated) DEVICE — SU SILK 0 TIE 6X30" A306H

## (undated) DEVICE — NDL COUNTER 20CT 31142493

## (undated) DEVICE — PREP SKIN SCRUB TRAY 4461A

## (undated) DEVICE — DRAPE POUCH INSTRUMENT 1018

## (undated) DEVICE — SU SILK 3-0 SH CR 8X18" C013D

## (undated) DEVICE — SPONGE KITTNER 30-101

## (undated) DEVICE — LINEN TOWEL PACK X6 WHITE 5487

## (undated) DEVICE — DRAPE SHEET REV FOLD 3/4 9349

## (undated) DEVICE — SU ETHILON 3-0 PS-1 18" 1663H

## (undated) DEVICE — SU SILK 3-0 TIE 12X30" A304H

## (undated) DEVICE — SU PROLENE 4-0 RB-1DA 36" 8557H

## (undated) DEVICE — SU PROLENE 7-0 BV-1DA 30" 8703H

## (undated) DEVICE — DRSG PRIMAPORE 02X3" 7133

## (undated) DEVICE — ESU GROUND PAD THERMOGUARD PLUS ABC PEDS 7-382

## (undated) DEVICE — CLIP APPLIER 09 3/8" SM LIGACLIP MCS20

## (undated) DEVICE — VESSEL LOOPS YELLOW MAXI 31145694

## (undated) DEVICE — SUCTION MANIFOLD NEPTUNE 2 SYS 4 PORT 0702-020-000

## (undated) DEVICE — SU MONOCRYL 4-0 PS-2 27" UND Y426H

## (undated) DEVICE — SU SILK 4-0 TIE 12X30" A303H

## (undated) DEVICE — SU VICRYL+ 3-0 27IN SH UND VCP416H

## (undated) DEVICE — SUTURE BOOTS 051003PBX

## (undated) DEVICE — VESSEL LOOPS BLUE SUPERMAXI 011022PBX

## (undated) DEVICE — DRAPE IOBAN C-SECTION W/POUCH 30X35" 6657

## (undated) DEVICE — PREP POVIDONE-IODINE 7.5% SCRUB 4OZ BOTTLE MDS093945

## (undated) DEVICE — Device

## (undated) DEVICE — SURGICEL ABSORBABLE HEMOSTAT SNOW 4"X4" 2083

## (undated) DEVICE — SU PROLENE 5-0 RB-1DA 36"  8556H

## (undated) DEVICE — WIPES FOLEY CARE SURESTEP PROVON DFC100

## (undated) DEVICE — ESU ELEC BLADE HEX-LOCKING 2.5" E1450X

## (undated) DEVICE — SU PROLENE 4-0 SHDA 36" 8521H

## (undated) DEVICE — SU PROLENE 6-0 RB-2DA 30" 8711H

## (undated) DEVICE — SOL NACL 0.9% IRRIG 1000ML BOTTLE 2F7124

## (undated) DEVICE — SU PDS II 5-0 RB-2DA 30" Z148H

## (undated) DEVICE — STPL RELOAD LINEAR CUT 55MM TCR55

## (undated) DEVICE — SUCTION TIP YANKAUER STR K87

## (undated) DEVICE — PREP POVIDONE-IODINE 10% SOLUTION 4OZ BOTTLE MDS093944

## (undated) DEVICE — ESU HANDPIECE ABC BEND-A-BEAM 6" 134006

## (undated) DEVICE — ESU ELEC NDL 1" COATED/INSULATED E1465

## (undated) DEVICE — SYR 01ML 27GA 0.5" NDL TBC 309623

## (undated) DEVICE — SPECIMEN CONTAINER 5OZ STERILE 2600SA

## (undated) DEVICE — DRSG MEDIPORE 3 1/2X13 3/4" 3573

## (undated) DEVICE — SU PDS II 6-0 RB-2DA 30" Z149H

## (undated) DEVICE — DRAPE IOBAN INCISE 23X17" 6650EZ

## (undated) DEVICE — STPL LINEAR CUT 55MM TLC55

## (undated) DEVICE — SU SILK 2-0 TIE 12X30" A305H

## (undated) RX ORDER — PROPOFOL 10 MG/ML
INJECTION, EMULSION INTRAVENOUS
Status: DISPENSED
Start: 2024-07-10

## (undated) RX ORDER — METOPROLOL TARTRATE 1 MG/ML
INJECTION, SOLUTION INTRAVENOUS
Status: DISPENSED
Start: 2024-07-09

## (undated) RX ORDER — ESMOLOL HYDROCHLORIDE 10 MG/ML
INJECTION INTRAVENOUS
Status: DISPENSED
Start: 2024-07-10

## (undated) RX ORDER — DEXTROSE MONOHYDRATE, SODIUM CHLORIDE, AND POTASSIUM CHLORIDE 50; 1.49; 4.5 G/1000ML; G/1000ML; G/1000ML
INJECTION, SOLUTION INTRAVENOUS
Status: DISPENSED
Start: 2024-07-10

## (undated) RX ORDER — PIPERACILLIN SODIUM, TAZOBACTAM SODIUM 3; .375 G/15ML; G/15ML
INJECTION, POWDER, LYOPHILIZED, FOR SOLUTION INTRAVENOUS
Status: DISPENSED
Start: 2024-07-10

## (undated) RX ORDER — PHENYLEPHRINE HCL IN 0.9% NACL 50MG/250ML
PLASTIC BAG, INJECTION (ML) INTRAVENOUS
Status: DISPENSED
Start: 2024-07-10

## (undated) RX ORDER — FENTANYL CITRATE 50 UG/ML
INJECTION, SOLUTION INTRAMUSCULAR; INTRAVENOUS
Status: DISPENSED
Start: 2024-07-10

## (undated) RX ORDER — SODIUM CHLORIDE 9 MG/ML
INJECTION, SOLUTION INTRAVENOUS
Status: DISPENSED
Start: 2024-07-24

## (undated) RX ORDER — HEPARIN SODIUM 1000 [USP'U]/ML
INJECTION, SOLUTION INTRAVENOUS; SUBCUTANEOUS
Status: DISPENSED
Start: 2024-07-10

## (undated) RX ORDER — PAPAVERINE HYDROCHLORIDE 30 MG/ML
INJECTION INTRAMUSCULAR; INTRAVENOUS
Status: DISPENSED
Start: 2024-07-10

## (undated) RX ORDER — ATROPINE SULFATE 0.4 MG/ML
AMPUL (ML) INJECTION
Status: DISPENSED
Start: 2024-07-09

## (undated) RX ORDER — GLYCOPYRROLATE 0.2 MG/ML
INJECTION, SOLUTION INTRAMUSCULAR; INTRAVENOUS
Status: DISPENSED
Start: 2024-07-10

## (undated) RX ORDER — DEXAMETHASONE SODIUM PHOSPHATE 4 MG/ML
INJECTION, SOLUTION INTRA-ARTICULAR; INTRALESIONAL; INTRAMUSCULAR; INTRAVENOUS; SOFT TISSUE
Status: DISPENSED
Start: 2024-07-10

## (undated) RX ORDER — METOPROLOL TARTRATE 1 MG/ML
INJECTION, SOLUTION INTRAVENOUS
Status: DISPENSED
Start: 2024-07-10

## (undated) RX ORDER — LIDOCAINE HYDROCHLORIDE 10 MG/ML
INJECTION, SOLUTION EPIDURAL; INFILTRATION; INTRACAUDAL; PERINEURAL
Status: DISPENSED
Start: 2024-07-24

## (undated) RX ORDER — IOPAMIDOL 510 MG/ML
INJECTION, SOLUTION INTRAVASCULAR
Status: DISPENSED
Start: 2024-07-10

## (undated) RX ORDER — HEPARIN SODIUM 5000 [USP'U]/.5ML
INJECTION, SOLUTION INTRAVENOUS; SUBCUTANEOUS
Status: DISPENSED
Start: 2024-07-10

## (undated) RX ORDER — CEFAZOLIN SODIUM 1 G/3ML
INJECTION, POWDER, FOR SOLUTION INTRAMUSCULAR; INTRAVENOUS
Status: DISPENSED
Start: 2024-07-10

## (undated) RX ORDER — ONDANSETRON 2 MG/ML
INJECTION INTRAMUSCULAR; INTRAVENOUS
Status: DISPENSED
Start: 2024-07-10

## (undated) RX ORDER — HYDROMORPHONE HCL IN WATER/PF 6 MG/30 ML
PATIENT CONTROLLED ANALGESIA SYRINGE INTRAVENOUS
Status: DISPENSED
Start: 2024-07-10

## (undated) RX ORDER — HYDROMORPHONE HYDROCHLORIDE 1 MG/ML
INJECTION, SOLUTION INTRAMUSCULAR; INTRAVENOUS; SUBCUTANEOUS
Status: DISPENSED
Start: 2024-07-10

## (undated) RX ORDER — FENTANYL CITRATE-0.9 % NACL/PF 10 MCG/ML
PLASTIC BAG, INJECTION (ML) INTRAVENOUS
Status: DISPENSED
Start: 2024-07-10

## (undated) RX ORDER — BUPIVACAINE HYDROCHLORIDE AND EPINEPHRINE 2.5; 5 MG/ML; UG/ML
INJECTION, SOLUTION EPIDURAL; INFILTRATION; INTRACAUDAL; PERINEURAL
Status: DISPENSED
Start: 2024-07-10

## (undated) RX ORDER — LABETALOL HYDROCHLORIDE 5 MG/ML
INJECTION, SOLUTION INTRAVENOUS
Status: DISPENSED
Start: 2024-07-10

## (undated) RX ORDER — DEXTROSE MONOHYDRATE 25 G/50ML
INJECTION, SOLUTION INTRAVENOUS
Status: DISPENSED
Start: 2024-07-10

## (undated) RX ORDER — AMPICILLIN AND SULBACTAM 2; 1 G/1; G/1
INJECTION, POWDER, FOR SOLUTION INTRAMUSCULAR; INTRAVENOUS
Status: DISPENSED
Start: 2024-07-24

## (undated) RX ORDER — LIDOCAINE HYDROCHLORIDE 10 MG/ML
INJECTION, SOLUTION EPIDURAL; INFILTRATION; INTRACAUDAL; PERINEURAL
Status: DISPENSED
Start: 2024-07-16

## (undated) RX ORDER — DOBUTAMINE HYDROCHLORIDE 200 MG/100ML
INJECTION INTRAVENOUS
Status: DISPENSED
Start: 2024-07-09

## (undated) RX ORDER — FUROSEMIDE 10 MG/ML
INJECTION INTRAMUSCULAR; INTRAVENOUS
Status: DISPENSED
Start: 2024-07-10